# Patient Record
Sex: FEMALE | Race: WHITE | NOT HISPANIC OR LATINO | Employment: OTHER | URBAN - METROPOLITAN AREA
[De-identification: names, ages, dates, MRNs, and addresses within clinical notes are randomized per-mention and may not be internally consistent; named-entity substitution may affect disease eponyms.]

---

## 2019-06-26 ENCOUNTER — RECORDS - HEALTHEAST (OUTPATIENT)
Dept: LAB | Facility: CLINIC | Age: 79
End: 2019-06-26

## 2019-06-27 LAB
ANION GAP SERPL CALCULATED.3IONS-SCNC: 8 MMOL/L (ref 5–18)
BUN SERPL-MCNC: 8 MG/DL (ref 8–28)
CALCIUM SERPL-MCNC: 8.8 MG/DL (ref 8.5–10.5)
CHLORIDE BLD-SCNC: 98 MMOL/L (ref 98–107)
CO2 SERPL-SCNC: 24 MMOL/L (ref 22–31)
CREAT SERPL-MCNC: 0.56 MG/DL (ref 0.6–1.1)
GFR SERPL CREATININE-BSD FRML MDRD: >60 ML/MIN/1.73M2
GLUCOSE BLD-MCNC: 89 MG/DL (ref 70–125)
POTASSIUM BLD-SCNC: 3.7 MMOL/L (ref 3.5–5)
SODIUM SERPL-SCNC: 130 MMOL/L (ref 136–145)

## 2019-06-28 ENCOUNTER — RECORDS - HEALTHEAST (OUTPATIENT)
Dept: LAB | Facility: CLINIC | Age: 79
End: 2019-06-28

## 2019-06-28 LAB — SODIUM SERPL-SCNC: 133 MMOL/L (ref 136–145)

## 2019-07-05 ENCOUNTER — RECORDS - HEALTHEAST (OUTPATIENT)
Dept: LAB | Facility: CLINIC | Age: 79
End: 2019-07-05

## 2019-07-08 LAB
ANION GAP SERPL CALCULATED.3IONS-SCNC: 10 MMOL/L (ref 5–18)
BUN SERPL-MCNC: 8 MG/DL (ref 8–28)
CALCIUM SERPL-MCNC: 9.4 MG/DL (ref 8.5–10.5)
CHLORIDE BLD-SCNC: 100 MMOL/L (ref 98–107)
CO2 SERPL-SCNC: 24 MMOL/L (ref 22–31)
CREAT SERPL-MCNC: 0.59 MG/DL (ref 0.6–1.1)
GFR SERPL CREATININE-BSD FRML MDRD: >60 ML/MIN/1.73M2
GLUCOSE BLD-MCNC: 83 MG/DL (ref 70–125)
POTASSIUM BLD-SCNC: 3.9 MMOL/L (ref 3.5–5)
SODIUM SERPL-SCNC: 134 MMOL/L (ref 136–145)

## 2021-11-26 ENCOUNTER — LAB REQUISITION (OUTPATIENT)
Dept: LAB | Facility: CLINIC | Age: 81
End: 2021-11-26
Payer: COMMERCIAL

## 2021-11-26 ENCOUNTER — LAB REQUISITION (OUTPATIENT)
Dept: LAB | Facility: CLINIC | Age: 81
End: 2021-11-26

## 2021-11-26 DIAGNOSIS — M81.0 AGE-RELATED OSTEOPOROSIS WITHOUT CURRENT PATHOLOGICAL FRACTURE: ICD-10-CM

## 2021-11-26 DIAGNOSIS — M06.9 RHEUMATOID ARTHRITIS, UNSPECIFIED (H): ICD-10-CM

## 2021-11-26 DIAGNOSIS — K29.80 DUODENITIS WITHOUT BLEEDING: ICD-10-CM

## 2021-11-26 DIAGNOSIS — E22.2 SYNDROME OF INAPPROPRIATE SECRETION OF ANTIDIURETIC HORMONE (H): ICD-10-CM

## 2021-11-26 DIAGNOSIS — E22.0 ACROMEGALY AND PITUITARY GIGANTISM (H): ICD-10-CM

## 2021-11-26 LAB
ALBUMIN SERPL-MCNC: 3 G/DL (ref 3.5–5)
ANION GAP SERPL CALCULATED.3IONS-SCNC: 13 MMOL/L (ref 5–18)
BUN SERPL-MCNC: 9 MG/DL (ref 8–28)
CALCIUM SERPL-MCNC: 8.7 MG/DL (ref 8.5–10.5)
CHLORIDE BLD-SCNC: 98 MMOL/L (ref 98–107)
CO2 SERPL-SCNC: 20 MMOL/L (ref 22–31)
CREAT SERPL-MCNC: 0.67 MG/DL (ref 0.6–1.1)
ERYTHROCYTE [DISTWIDTH] IN BLOOD BY AUTOMATED COUNT: 13.2 % (ref 10–15)
ERYTHROCYTE [SEDIMENTATION RATE] IN BLOOD BY WESTERGREN METHOD: 80 MM/HR (ref 0–20)
GFR SERPL CREATININE-BSD FRML MDRD: 82 ML/MIN/1.73M2
GLUCOSE BLD-MCNC: 114 MG/DL (ref 70–125)
HCT VFR BLD AUTO: 31.5 % (ref 35–47)
HGB BLD-MCNC: 10.2 G/DL (ref 11.7–15.7)
MCH RBC QN AUTO: 30.4 PG (ref 26.5–33)
MCHC RBC AUTO-ENTMCNC: 32.4 G/DL (ref 31.5–36.5)
MCV RBC AUTO: 94 FL (ref 78–100)
PHOSPHATE SERPL-MCNC: 3.3 MG/DL (ref 2.5–4.5)
PLATELET # BLD AUTO: 287 10E3/UL (ref 150–450)
POTASSIUM BLD-SCNC: 4.4 MMOL/L (ref 3.5–5)
RBC # BLD AUTO: 3.36 10E6/UL (ref 3.8–5.2)
SODIUM SERPL-SCNC: 131 MMOL/L (ref 136–145)
WBC # BLD AUTO: 6.4 10E3/UL (ref 4–11)

## 2021-11-26 PROCEDURE — 85652 RBC SED RATE AUTOMATED: CPT | Mod: ORL | Performed by: FAMILY MEDICINE

## 2021-11-26 PROCEDURE — 85027 COMPLETE CBC AUTOMATED: CPT | Mod: ORL | Performed by: FAMILY MEDICINE

## 2021-11-26 PROCEDURE — 82306 VITAMIN D 25 HYDROXY: CPT | Mod: ORL | Performed by: FAMILY MEDICINE

## 2021-11-26 PROCEDURE — 80069 RENAL FUNCTION PANEL: CPT | Mod: ORL | Performed by: FAMILY MEDICINE

## 2021-11-29 LAB — DEPRECATED CALCIDIOL+CALCIFEROL SERPL-MC: 68 UG/L (ref 30–80)

## 2021-12-28 ENCOUNTER — LAB REQUISITION (OUTPATIENT)
Dept: LAB | Facility: CLINIC | Age: 81
End: 2021-12-28
Payer: COMMERCIAL

## 2021-12-28 DIAGNOSIS — E87.1 HYPO-OSMOLALITY AND HYPONATREMIA: ICD-10-CM

## 2021-12-30 LAB
ANION GAP SERPL CALCULATED.3IONS-SCNC: 13 MMOL/L (ref 5–18)
BUN SERPL-MCNC: 12 MG/DL (ref 8–28)
CALCIUM SERPL-MCNC: 9.3 MG/DL (ref 8.5–10.5)
CHLORIDE BLD-SCNC: 93 MMOL/L (ref 98–107)
CO2 SERPL-SCNC: 20 MMOL/L (ref 22–31)
CREAT SERPL-MCNC: 0.72 MG/DL (ref 0.6–1.1)
GFR SERPL CREATININE-BSD FRML MDRD: 83 ML/MIN/1.73M2
GLUCOSE BLD-MCNC: 95 MG/DL (ref 70–125)
POTASSIUM BLD-SCNC: 4.5 MMOL/L (ref 3.5–5)
SODIUM SERPL-SCNC: 126 MMOL/L (ref 136–145)

## 2021-12-30 PROCEDURE — 80048 BASIC METABOLIC PNL TOTAL CA: CPT | Mod: ORL | Performed by: FAMILY MEDICINE

## 2021-12-30 PROCEDURE — P9603 ONE-WAY ALLOW PRORATED MILES: HCPCS | Mod: ORL | Performed by: FAMILY MEDICINE

## 2021-12-30 PROCEDURE — 36415 COLL VENOUS BLD VENIPUNCTURE: CPT | Mod: ORL | Performed by: FAMILY MEDICINE

## 2022-01-03 ENCOUNTER — LAB REQUISITION (OUTPATIENT)
Dept: LAB | Facility: CLINIC | Age: 82
End: 2022-01-03

## 2022-01-03 ENCOUNTER — LAB REQUISITION (OUTPATIENT)
Dept: LAB | Facility: CLINIC | Age: 82
End: 2022-01-03
Payer: COMMERCIAL

## 2022-01-03 DIAGNOSIS — E87.1 HYPO-OSMOLALITY AND HYPONATREMIA: ICD-10-CM

## 2022-01-03 DIAGNOSIS — I10 ESSENTIAL (PRIMARY) HYPERTENSION: ICD-10-CM

## 2022-01-03 LAB
ANION GAP SERPL CALCULATED.3IONS-SCNC: 15 MMOL/L (ref 5–18)
BUN SERPL-MCNC: 11 MG/DL (ref 8–28)
CALCIUM SERPL-MCNC: 9.8 MG/DL (ref 8.5–10.5)
CHLORIDE BLD-SCNC: 95 MMOL/L (ref 98–107)
CO2 SERPL-SCNC: 20 MMOL/L (ref 22–31)
CREAT SERPL-MCNC: 0.74 MG/DL (ref 0.6–1.1)
GFR SERPL CREATININE-BSD FRML MDRD: 80 ML/MIN/1.73M2
GLUCOSE BLD-MCNC: 116 MG/DL (ref 70–125)
POTASSIUM BLD-SCNC: 4.4 MMOL/L (ref 3.5–5)
SODIUM SERPL-SCNC: 130 MMOL/L (ref 136–145)

## 2022-01-03 PROCEDURE — 36415 COLL VENOUS BLD VENIPUNCTURE: CPT | Mod: ORL | Performed by: FAMILY MEDICINE

## 2022-01-03 PROCEDURE — 80048 BASIC METABOLIC PNL TOTAL CA: CPT | Mod: ORL | Performed by: FAMILY MEDICINE

## 2022-01-03 PROCEDURE — P9603 ONE-WAY ALLOW PRORATED MILES: HCPCS | Mod: ORL | Performed by: FAMILY MEDICINE

## 2022-01-13 ENCOUNTER — LAB REQUISITION (OUTPATIENT)
Dept: LAB | Facility: CLINIC | Age: 82
End: 2022-01-13
Payer: COMMERCIAL

## 2022-01-13 LAB
ALBUMIN UR-MCNC: NEGATIVE MG/DL
APPEARANCE UR: ABNORMAL
BACTERIA #/AREA URNS HPF: ABNORMAL /HPF
BILIRUB UR QL STRIP: NEGATIVE
COLOR UR AUTO: ABNORMAL
GLUCOSE UR STRIP-MCNC: NEGATIVE MG/DL
HGB UR QL STRIP: NEGATIVE
KETONES UR STRIP-MCNC: NEGATIVE MG/DL
LEUKOCYTE ESTERASE UR QL STRIP: ABNORMAL
MUCOUS THREADS #/AREA URNS LPF: PRESENT /LPF
NITRATE UR QL: POSITIVE
PH UR STRIP: 6.5 [PH] (ref 5–7)
RBC URINE: 1 /HPF
SP GR UR STRIP: 1.01 (ref 1–1.03)
SQUAMOUS EPITHELIAL: <1 /HPF
UROBILINOGEN UR STRIP-MCNC: <2 MG/DL
WBC URINE: >182 /HPF

## 2022-01-13 PROCEDURE — 87086 URINE CULTURE/COLONY COUNT: CPT | Mod: ORL | Performed by: FAMILY MEDICINE

## 2022-01-13 PROCEDURE — 81001 URINALYSIS AUTO W/SCOPE: CPT | Mod: ORL | Performed by: FAMILY MEDICINE

## 2022-01-15 LAB — BACTERIA UR CULT: ABNORMAL

## 2022-02-15 ENCOUNTER — LAB REQUISITION (OUTPATIENT)
Dept: LAB | Facility: CLINIC | Age: 82
End: 2022-02-15
Payer: COMMERCIAL

## 2022-02-15 DIAGNOSIS — E87.1 HYPO-OSMOLALITY AND HYPONATREMIA: ICD-10-CM

## 2022-02-16 LAB
ANION GAP SERPL CALCULATED.3IONS-SCNC: 11 MMOL/L (ref 5–18)
BUN SERPL-MCNC: 10 MG/DL (ref 8–28)
CALCIUM SERPL-MCNC: 9.9 MG/DL (ref 8.5–10.5)
CHLORIDE BLD-SCNC: 97 MMOL/L (ref 98–107)
CO2 SERPL-SCNC: 27 MMOL/L (ref 22–31)
CREAT SERPL-MCNC: 0.71 MG/DL (ref 0.6–1.1)
GFR SERPL CREATININE-BSD FRML MDRD: 84 ML/MIN/1.73M2
GLUCOSE BLD-MCNC: 103 MG/DL (ref 70–125)
POTASSIUM BLD-SCNC: 4.3 MMOL/L (ref 3.5–5)
SODIUM SERPL-SCNC: 135 MMOL/L (ref 136–145)

## 2022-02-16 PROCEDURE — 80048 BASIC METABOLIC PNL TOTAL CA: CPT | Mod: ORL | Performed by: FAMILY MEDICINE

## 2022-02-16 PROCEDURE — 36415 COLL VENOUS BLD VENIPUNCTURE: CPT | Mod: ORL | Performed by: FAMILY MEDICINE

## 2022-02-16 PROCEDURE — P9604 ONE-WAY ALLOW PRORATED TRIP: HCPCS | Mod: ORL | Performed by: FAMILY MEDICINE

## 2022-02-22 ENCOUNTER — LAB REQUISITION (OUTPATIENT)
Dept: LAB | Facility: CLINIC | Age: 82
End: 2022-02-22
Payer: COMMERCIAL

## 2022-02-22 DIAGNOSIS — R39.0 EXTRAVASATION OF URINE: ICD-10-CM

## 2022-02-22 LAB
ALBUMIN UR-MCNC: 10 MG/DL
APPEARANCE UR: ABNORMAL
BACTERIA #/AREA URNS HPF: ABNORMAL /HPF
BILIRUB UR QL STRIP: NEGATIVE
COLOR UR AUTO: YELLOW
GLUCOSE UR STRIP-MCNC: NEGATIVE MG/DL
HGB UR QL STRIP: ABNORMAL
KETONES UR STRIP-MCNC: NEGATIVE MG/DL
LEUKOCYTE ESTERASE UR QL STRIP: ABNORMAL
NITRATE UR QL: NEGATIVE
PH UR STRIP: 6.5 [PH] (ref 5–7)
RBC URINE: 0 /HPF
SP GR UR STRIP: 1.01 (ref 1–1.03)
UROBILINOGEN UR STRIP-MCNC: <2 MG/DL
WBC CLUMPS #/AREA URNS HPF: PRESENT /HPF
WBC URINE: >182 /HPF

## 2022-02-22 PROCEDURE — 81001 URINALYSIS AUTO W/SCOPE: CPT | Mod: ORL | Performed by: FAMILY MEDICINE

## 2022-02-22 PROCEDURE — 87186 SC STD MICRODIL/AGAR DIL: CPT | Mod: ORL | Performed by: FAMILY MEDICINE

## 2022-02-24 LAB — BACTERIA UR CULT: ABNORMAL

## 2022-03-11 ENCOUNTER — LAB REQUISITION (OUTPATIENT)
Dept: LAB | Facility: CLINIC | Age: 82
End: 2022-03-11
Payer: COMMERCIAL

## 2022-03-11 DIAGNOSIS — R39.0 EXTRAVASATION OF URINE: ICD-10-CM

## 2022-03-11 LAB
ALBUMIN UR-MCNC: NEGATIVE MG/DL
APPEARANCE UR: CLEAR
BILIRUB UR QL STRIP: NEGATIVE
COLOR UR AUTO: NORMAL
GLUCOSE UR STRIP-MCNC: NEGATIVE MG/DL
HGB UR QL STRIP: NEGATIVE
KETONES UR STRIP-MCNC: NEGATIVE MG/DL
LEUKOCYTE ESTERASE UR QL STRIP: NEGATIVE
NITRATE UR QL: NEGATIVE
PH UR STRIP: 6.5 [PH] (ref 5–7)
SP GR UR STRIP: 1.01 (ref 1–1.03)
UROBILINOGEN UR STRIP-MCNC: <2 MG/DL

## 2022-03-11 PROCEDURE — 87086 URINE CULTURE/COLONY COUNT: CPT | Mod: ORL | Performed by: FAMILY MEDICINE

## 2022-03-11 PROCEDURE — 81003 URINALYSIS AUTO W/O SCOPE: CPT | Mod: ORL | Performed by: FAMILY MEDICINE

## 2022-03-12 LAB — BACTERIA UR CULT: NO GROWTH

## 2022-04-09 ENCOUNTER — LAB REQUISITION (OUTPATIENT)
Dept: LAB | Facility: CLINIC | Age: 82
End: 2022-04-09
Payer: COMMERCIAL

## 2022-04-09 DIAGNOSIS — I50.33 ACUTE ON CHRONIC DIASTOLIC (CONGESTIVE) HEART FAILURE (H): ICD-10-CM

## 2022-04-10 LAB
ANION GAP SERPL CALCULATED.3IONS-SCNC: 17 MMOL/L (ref 5–18)
BUN SERPL-MCNC: 19 MG/DL (ref 8–28)
CALCIUM SERPL-MCNC: 9.3 MG/DL (ref 8.5–10.5)
CHLORIDE BLD-SCNC: 98 MMOL/L (ref 98–107)
CO2 SERPL-SCNC: 18 MMOL/L (ref 22–31)
CREAT SERPL-MCNC: 0.96 MG/DL (ref 0.6–1.1)
ERYTHROCYTE [DISTWIDTH] IN BLOOD BY AUTOMATED COUNT: 13.8 % (ref 10–15)
GFR SERPL CREATININE-BSD FRML MDRD: 59 ML/MIN/1.73M2
GLUCOSE BLD-MCNC: 120 MG/DL (ref 70–125)
HCT VFR BLD AUTO: 36.4 % (ref 35–47)
HGB BLD-MCNC: 11.8 G/DL (ref 11.7–15.7)
MCH RBC QN AUTO: 30.9 PG (ref 26.5–33)
MCHC RBC AUTO-ENTMCNC: 32.4 G/DL (ref 31.5–36.5)
MCV RBC AUTO: 95 FL (ref 78–100)
PLATELET # BLD AUTO: 252 10E3/UL (ref 150–450)
POTASSIUM BLD-SCNC: 4.1 MMOL/L (ref 3.5–5)
RBC # BLD AUTO: 3.82 10E6/UL (ref 3.8–5.2)
SODIUM SERPL-SCNC: 133 MMOL/L (ref 136–145)
WBC # BLD AUTO: 7.8 10E3/UL (ref 4–11)

## 2022-04-10 PROCEDURE — 36415 COLL VENOUS BLD VENIPUNCTURE: CPT | Performed by: FAMILY MEDICINE

## 2022-04-10 PROCEDURE — 80048 BASIC METABOLIC PNL TOTAL CA: CPT | Performed by: FAMILY MEDICINE

## 2022-04-10 PROCEDURE — P9603 ONE-WAY ALLOW PRORATED MILES: HCPCS | Performed by: FAMILY MEDICINE

## 2022-04-10 PROCEDURE — 85027 COMPLETE CBC AUTOMATED: CPT | Performed by: FAMILY MEDICINE

## 2022-05-09 ENCOUNTER — LAB REQUISITION (OUTPATIENT)
Dept: LAB | Facility: CLINIC | Age: 82
End: 2022-05-09
Payer: COMMERCIAL

## 2022-05-09 DIAGNOSIS — M06.9 RHEUMATOID ARTHRITIS, UNSPECIFIED (H): ICD-10-CM

## 2022-05-11 LAB
C REACTIVE PROTEIN LHE: 0.6 MG/DL (ref 0–0.8)
ERYTHROCYTE [SEDIMENTATION RATE] IN BLOOD BY WESTERGREN METHOD: 48 MM/HR (ref 0–20)

## 2022-05-11 PROCEDURE — 85652 RBC SED RATE AUTOMATED: CPT | Mod: ORL | Performed by: INTERNAL MEDICINE

## 2022-05-11 PROCEDURE — 36415 COLL VENOUS BLD VENIPUNCTURE: CPT | Mod: ORL | Performed by: INTERNAL MEDICINE

## 2022-05-11 PROCEDURE — P9604 ONE-WAY ALLOW PRORATED TRIP: HCPCS | Mod: ORL | Performed by: INTERNAL MEDICINE

## 2022-05-11 PROCEDURE — 86140 C-REACTIVE PROTEIN: CPT | Mod: ORL | Performed by: INTERNAL MEDICINE

## 2022-08-28 ENCOUNTER — LAB REQUISITION (OUTPATIENT)
Dept: LAB | Facility: CLINIC | Age: 82
End: 2022-08-28
Payer: COMMERCIAL

## 2022-08-28 DIAGNOSIS — E87.1 HYPO-OSMOLALITY AND HYPONATREMIA: ICD-10-CM

## 2022-08-28 DIAGNOSIS — I50.32 CHRONIC DIASTOLIC (CONGESTIVE) HEART FAILURE (H): ICD-10-CM

## 2022-08-28 DIAGNOSIS — D64.9 ANEMIA, UNSPECIFIED: ICD-10-CM

## 2022-08-31 LAB
ERYTHROCYTE [DISTWIDTH] IN BLOOD BY AUTOMATED COUNT: 13.9 % (ref 10–15)
HCT VFR BLD AUTO: 36 % (ref 35–47)
HGB BLD-MCNC: 11 G/DL (ref 11.7–15.7)
MCH RBC QN AUTO: 29.9 PG (ref 26.5–33)
MCHC RBC AUTO-ENTMCNC: 30.6 G/DL (ref 31.5–36.5)
MCV RBC AUTO: 98 FL (ref 78–100)
PLATELET # BLD AUTO: 277 10E3/UL (ref 150–450)
RBC # BLD AUTO: 3.68 10E6/UL (ref 3.8–5.2)
WBC # BLD AUTO: 9.3 10E3/UL (ref 4–11)

## 2022-08-31 PROCEDURE — 85027 COMPLETE CBC AUTOMATED: CPT | Mod: ORL | Performed by: FAMILY MEDICINE

## 2022-08-31 PROCEDURE — 80053 COMPREHEN METABOLIC PANEL: CPT | Mod: ORL | Performed by: FAMILY MEDICINE

## 2022-08-31 PROCEDURE — 83880 ASSAY OF NATRIURETIC PEPTIDE: CPT | Mod: ORL | Performed by: FAMILY MEDICINE

## 2022-08-31 PROCEDURE — 84443 ASSAY THYROID STIM HORMONE: CPT | Mod: ORL | Performed by: FAMILY MEDICINE

## 2022-08-31 PROCEDURE — 36415 COLL VENOUS BLD VENIPUNCTURE: CPT | Mod: ORL | Performed by: FAMILY MEDICINE

## 2022-08-31 PROCEDURE — P9604 ONE-WAY ALLOW PRORATED TRIP: HCPCS | Mod: ORL | Performed by: FAMILY MEDICINE

## 2022-09-01 LAB
ALBUMIN SERPL BCG-MCNC: 4.2 G/DL (ref 3.5–5.2)
ALP SERPL-CCNC: 90 U/L (ref 35–104)
ALT SERPL W P-5'-P-CCNC: 8 U/L (ref 10–35)
ANION GAP SERPL CALCULATED.3IONS-SCNC: 16 MMOL/L (ref 7–15)
AST SERPL W P-5'-P-CCNC: 30 U/L (ref 10–35)
BILIRUB SERPL-MCNC: 0.3 MG/DL
BUN SERPL-MCNC: 13.7 MG/DL (ref 8–23)
CALCIUM SERPL-MCNC: 9.6 MG/DL (ref 8.8–10.2)
CHLORIDE SERPL-SCNC: 95 MMOL/L (ref 98–107)
CREAT SERPL-MCNC: 0.69 MG/DL (ref 0.51–0.95)
DEPRECATED HCO3 PLAS-SCNC: 24 MMOL/L (ref 22–29)
GFR SERPL CREATININE-BSD FRML MDRD: 86 ML/MIN/1.73M2
GLUCOSE SERPL-MCNC: 89 MG/DL (ref 70–99)
NT-PROBNP SERPL-MCNC: 1656 PG/ML (ref 0–450)
POTASSIUM SERPL-SCNC: 3.7 MMOL/L (ref 3.4–5.3)
PROT SERPL-MCNC: 7.6 G/DL (ref 6.4–8.3)
SODIUM SERPL-SCNC: 135 MMOL/L (ref 136–145)
TSH SERPL DL<=0.005 MIU/L-ACNC: 1.24 UIU/ML (ref 0.3–4.2)

## 2022-09-13 ENCOUNTER — LAB REQUISITION (OUTPATIENT)
Dept: LAB | Facility: CLINIC | Age: 82
End: 2022-09-13
Payer: COMMERCIAL

## 2022-09-13 DIAGNOSIS — L08.9 LOCAL INFECTION OF THE SKIN AND SUBCUTANEOUS TISSUE, UNSPECIFIED: ICD-10-CM

## 2022-09-14 LAB
BASOPHILS # BLD AUTO: 0.1 10E3/UL (ref 0–0.2)
BASOPHILS NFR BLD AUTO: 1 %
CRP SERPL-MCNC: 9.9 MG/L
EOSINOPHIL # BLD AUTO: 0.4 10E3/UL (ref 0–0.7)
EOSINOPHIL NFR BLD AUTO: 4 %
ERYTHROCYTE [DISTWIDTH] IN BLOOD BY AUTOMATED COUNT: 13.7 % (ref 10–15)
HCT VFR BLD AUTO: 34.1 % (ref 35–47)
HGB BLD-MCNC: 10.6 G/DL (ref 11.7–15.7)
IMM GRANULOCYTES # BLD: 0.1 10E3/UL
IMM GRANULOCYTES NFR BLD: 1 %
LYMPHOCYTES # BLD AUTO: 1.7 10E3/UL (ref 0.8–5.3)
LYMPHOCYTES NFR BLD AUTO: 16 %
MCH RBC QN AUTO: 29.9 PG (ref 26.5–33)
MCHC RBC AUTO-ENTMCNC: 31.1 G/DL (ref 31.5–36.5)
MCV RBC AUTO: 96 FL (ref 78–100)
MONOCYTES # BLD AUTO: 1.2 10E3/UL (ref 0–1.3)
MONOCYTES NFR BLD AUTO: 11 %
NEUTROPHILS # BLD AUTO: 7.5 10E3/UL (ref 1.6–8.3)
NEUTROPHILS NFR BLD AUTO: 67 %
NRBC # BLD AUTO: 0 10E3/UL
NRBC BLD AUTO-RTO: 0 /100
PLATELET # BLD AUTO: 256 10E3/UL (ref 150–450)
RBC # BLD AUTO: 3.54 10E6/UL (ref 3.8–5.2)
WBC # BLD AUTO: 11.1 10E3/UL (ref 4–11)

## 2022-09-14 PROCEDURE — P9604 ONE-WAY ALLOW PRORATED TRIP: HCPCS | Mod: ORL | Performed by: FAMILY MEDICINE

## 2022-09-14 PROCEDURE — 36415 COLL VENOUS BLD VENIPUNCTURE: CPT | Mod: ORL | Performed by: FAMILY MEDICINE

## 2022-09-14 PROCEDURE — 85025 COMPLETE CBC W/AUTO DIFF WBC: CPT | Mod: ORL | Performed by: FAMILY MEDICINE

## 2022-09-14 PROCEDURE — 86140 C-REACTIVE PROTEIN: CPT | Mod: ORL | Performed by: FAMILY MEDICINE

## 2023-01-01 ENCOUNTER — APPOINTMENT (OUTPATIENT)
Dept: CT IMAGING | Facility: CLINIC | Age: 83
DRG: 871 | End: 2023-01-01
Attending: EMERGENCY MEDICINE
Payer: COMMERCIAL

## 2023-01-01 ENCOUNTER — APPOINTMENT (OUTPATIENT)
Dept: GENERAL RADIOLOGY | Facility: CLINIC | Age: 83
DRG: 280 | End: 2023-01-01
Payer: COMMERCIAL

## 2023-01-01 ENCOUNTER — PATIENT OUTREACH (OUTPATIENT)
Dept: CARE COORDINATION | Facility: CLINIC | Age: 83
End: 2023-01-01
Payer: COMMERCIAL

## 2023-01-01 ENCOUNTER — APPOINTMENT (OUTPATIENT)
Dept: PHYSICAL THERAPY | Facility: CLINIC | Age: 83
DRG: 280 | End: 2023-01-01
Payer: COMMERCIAL

## 2023-01-01 ENCOUNTER — APPOINTMENT (OUTPATIENT)
Dept: GENERAL RADIOLOGY | Facility: CLINIC | Age: 83
End: 2023-01-01
Attending: EMERGENCY MEDICINE
Payer: COMMERCIAL

## 2023-01-01 ENCOUNTER — LAB REQUISITION (OUTPATIENT)
Dept: LAB | Facility: CLINIC | Age: 83
End: 2023-01-01
Payer: COMMERCIAL

## 2023-01-01 ENCOUNTER — ANESTHESIA EVENT (OUTPATIENT)
Dept: MEDSURG UNIT | Facility: CLINIC | Age: 83
DRG: 871 | End: 2023-01-01
Payer: COMMERCIAL

## 2023-01-01 ENCOUNTER — ANESTHESIA (OUTPATIENT)
Dept: MEDSURG UNIT | Facility: CLINIC | Age: 83
DRG: 871 | End: 2023-01-01
Payer: COMMERCIAL

## 2023-01-01 ENCOUNTER — APPOINTMENT (OUTPATIENT)
Dept: CARDIOLOGY | Facility: CLINIC | Age: 83
DRG: 871 | End: 2023-01-01
Attending: HOSPITALIST
Payer: COMMERCIAL

## 2023-01-01 ENCOUNTER — APPOINTMENT (OUTPATIENT)
Dept: NUCLEAR MEDICINE | Facility: CLINIC | Age: 83
DRG: 280 | End: 2023-01-01
Attending: PHYSICIAN ASSISTANT
Payer: COMMERCIAL

## 2023-01-01 ENCOUNTER — APPOINTMENT (OUTPATIENT)
Dept: GENERAL RADIOLOGY | Facility: CLINIC | Age: 83
DRG: 871 | End: 2023-01-01
Attending: SURGERY
Payer: COMMERCIAL

## 2023-01-01 ENCOUNTER — APPOINTMENT (OUTPATIENT)
Dept: CARDIOLOGY | Facility: CLINIC | Age: 83
DRG: 280 | End: 2023-01-01
Payer: COMMERCIAL

## 2023-01-01 ENCOUNTER — DOCUMENTATION ONLY (OUTPATIENT)
Dept: OTHER | Facility: CLINIC | Age: 83
End: 2023-01-01
Payer: COMMERCIAL

## 2023-01-01 ENCOUNTER — APPOINTMENT (OUTPATIENT)
Dept: CT IMAGING | Facility: CLINIC | Age: 83
End: 2023-01-01
Attending: HOSPITALIST
Payer: COMMERCIAL

## 2023-01-01 ENCOUNTER — APPOINTMENT (OUTPATIENT)
Dept: CT IMAGING | Facility: CLINIC | Age: 83
DRG: 871 | End: 2023-01-01
Payer: COMMERCIAL

## 2023-01-01 ENCOUNTER — TRANSFERRED RECORDS (OUTPATIENT)
Dept: HEALTH INFORMATION MANAGEMENT | Facility: CLINIC | Age: 83
End: 2023-01-01
Payer: COMMERCIAL

## 2023-01-01 ENCOUNTER — APPOINTMENT (OUTPATIENT)
Dept: GENERAL RADIOLOGY | Facility: CLINIC | Age: 83
DRG: 280 | End: 2023-01-01
Attending: EMERGENCY MEDICINE
Payer: COMMERCIAL

## 2023-01-01 ENCOUNTER — APPOINTMENT (OUTPATIENT)
Dept: CT IMAGING | Facility: CLINIC | Age: 83
End: 2023-01-01
Attending: EMERGENCY MEDICINE
Payer: COMMERCIAL

## 2023-01-01 ENCOUNTER — HOSPITAL ENCOUNTER (INPATIENT)
Facility: CLINIC | Age: 83
LOS: 7 days | Discharge: NURSING, REHAB OR LONG-TERM CARE | DRG: 280 | End: 2023-08-01
Attending: EMERGENCY MEDICINE | Admitting: INTERNAL MEDICINE
Payer: COMMERCIAL

## 2023-01-01 ENCOUNTER — APPOINTMENT (OUTPATIENT)
Dept: OCCUPATIONAL THERAPY | Facility: CLINIC | Age: 83
End: 2023-01-01
Attending: HOSPITALIST
Payer: COMMERCIAL

## 2023-01-01 ENCOUNTER — APPOINTMENT (OUTPATIENT)
Dept: GENERAL RADIOLOGY | Facility: CLINIC | Age: 83
DRG: 871 | End: 2023-01-01
Attending: INTERNAL MEDICINE
Payer: COMMERCIAL

## 2023-01-01 ENCOUNTER — TELEPHONE (OUTPATIENT)
Dept: CARDIOLOGY | Facility: CLINIC | Age: 83
End: 2023-01-01
Payer: COMMERCIAL

## 2023-01-01 ENCOUNTER — APPOINTMENT (OUTPATIENT)
Dept: CT IMAGING | Facility: CLINIC | Age: 83
DRG: 280 | End: 2023-01-01
Attending: EMERGENCY MEDICINE
Payer: COMMERCIAL

## 2023-01-01 ENCOUNTER — MEDICAL CORRESPONDENCE (OUTPATIENT)
Dept: HEALTH INFORMATION MANAGEMENT | Facility: CLINIC | Age: 83
End: 2023-01-01

## 2023-01-01 ENCOUNTER — APPOINTMENT (OUTPATIENT)
Dept: GENERAL RADIOLOGY | Facility: CLINIC | Age: 83
DRG: 871 | End: 2023-01-01
Attending: EMERGENCY MEDICINE
Payer: COMMERCIAL

## 2023-01-01 ENCOUNTER — APPOINTMENT (OUTPATIENT)
Dept: PHYSICAL THERAPY | Facility: CLINIC | Age: 83
End: 2023-01-01
Attending: HOSPITALIST
Payer: COMMERCIAL

## 2023-01-01 ENCOUNTER — HOSPITAL ENCOUNTER (INPATIENT)
Facility: CLINIC | Age: 83
LOS: 10 days | Discharge: HOSPICE | DRG: 871 | End: 2023-10-31
Attending: EMERGENCY MEDICINE | Admitting: HOSPITALIST
Payer: COMMERCIAL

## 2023-01-01 ENCOUNTER — HOSPITAL ENCOUNTER (OUTPATIENT)
Facility: CLINIC | Age: 83
Setting detail: OBSERVATION
Discharge: HOME | End: 2023-10-13
Attending: EMERGENCY MEDICINE | Admitting: HOSPITALIST
Payer: COMMERCIAL

## 2023-01-01 ENCOUNTER — APPOINTMENT (OUTPATIENT)
Dept: PHYSICAL THERAPY | Facility: CLINIC | Age: 83
DRG: 280 | End: 2023-01-01
Attending: STUDENT IN AN ORGANIZED HEALTH CARE EDUCATION/TRAINING PROGRAM
Payer: COMMERCIAL

## 2023-01-01 ENCOUNTER — APPOINTMENT (OUTPATIENT)
Dept: GENERAL RADIOLOGY | Facility: CLINIC | Age: 83
DRG: 871 | End: 2023-01-01
Payer: COMMERCIAL

## 2023-01-01 ENCOUNTER — HOSPITAL ENCOUNTER (EMERGENCY)
Facility: CLINIC | Age: 83
Discharge: HOME | End: 2023-04-04
Attending: EMERGENCY MEDICINE | Admitting: EMERGENCY MEDICINE
Payer: COMMERCIAL

## 2023-01-01 ENCOUNTER — APPOINTMENT (OUTPATIENT)
Dept: PHYSICAL THERAPY | Facility: CLINIC | Age: 83
DRG: 871 | End: 2023-01-01
Attending: NURSE PRACTITIONER
Payer: COMMERCIAL

## 2023-01-01 VITALS
WEIGHT: 99.65 LBS | DIASTOLIC BLOOD PRESSURE: 55 MMHG | BODY MASS INDEX: 18.23 KG/M2 | HEART RATE: 85 BPM | RESPIRATION RATE: 18 BRPM | OXYGEN SATURATION: 92 % | TEMPERATURE: 97.8 F | SYSTOLIC BLOOD PRESSURE: 122 MMHG

## 2023-01-01 VITALS
OXYGEN SATURATION: 93 % | HEIGHT: 62 IN | HEART RATE: 75 BPM | BODY MASS INDEX: 16.96 KG/M2 | RESPIRATION RATE: 17 BRPM | WEIGHT: 92.15 LBS | TEMPERATURE: 98.9 F | DIASTOLIC BLOOD PRESSURE: 42 MMHG | SYSTOLIC BLOOD PRESSURE: 104 MMHG

## 2023-01-01 VITALS
TEMPERATURE: 98 F | HEART RATE: 92 BPM | DIASTOLIC BLOOD PRESSURE: 65 MMHG | RESPIRATION RATE: 16 BRPM | SYSTOLIC BLOOD PRESSURE: 126 MMHG | OXYGEN SATURATION: 95 %

## 2023-01-01 VITALS
OXYGEN SATURATION: 95 % | SYSTOLIC BLOOD PRESSURE: 116 MMHG | BODY MASS INDEX: 16.57 KG/M2 | WEIGHT: 90.61 LBS | HEART RATE: 94 BPM | RESPIRATION RATE: 16 BRPM | DIASTOLIC BLOOD PRESSURE: 55 MMHG | TEMPERATURE: 97.7 F

## 2023-01-01 DIAGNOSIS — R19.7 DIARRHEA, UNSPECIFIED TYPE: ICD-10-CM

## 2023-01-01 DIAGNOSIS — E87.1 HYPO-OSMOLALITY AND HYPONATREMIA: ICD-10-CM

## 2023-01-01 DIAGNOSIS — J96.01 ACUTE RESPIRATORY FAILURE WITH HYPOXIA (H): ICD-10-CM

## 2023-01-01 DIAGNOSIS — N12 PYELONEPHRITIS: ICD-10-CM

## 2023-01-01 DIAGNOSIS — J98.4 PNEUMONITIS: ICD-10-CM

## 2023-01-01 DIAGNOSIS — E11.9 TYPE 2 DIABETES MELLITUS WITHOUT COMPLICATIONS (H): ICD-10-CM

## 2023-01-01 DIAGNOSIS — E87.20 ACIDOSIS, UNSPECIFIED: ICD-10-CM

## 2023-01-01 DIAGNOSIS — I50.33 ACUTE ON CHRONIC DIASTOLIC (CONGESTIVE) HEART FAILURE (H): ICD-10-CM

## 2023-01-01 DIAGNOSIS — D64.9 ANEMIA, UNSPECIFIED: ICD-10-CM

## 2023-01-01 DIAGNOSIS — I50.9 ACUTE ON CHRONIC CONGESTIVE HEART FAILURE, UNSPECIFIED HEART FAILURE TYPE (H): Primary | ICD-10-CM

## 2023-01-01 DIAGNOSIS — R11.2 NAUSEA AND VOMITING, UNSPECIFIED VOMITING TYPE: ICD-10-CM

## 2023-01-01 DIAGNOSIS — R35.0 FREQUENCY OF MICTURITION: ICD-10-CM

## 2023-01-01 DIAGNOSIS — I50.30 UNSPECIFIED DIASTOLIC (CONGESTIVE) HEART FAILURE (H): ICD-10-CM

## 2023-01-01 DIAGNOSIS — N39.0 URINARY TRACT INFECTION, SITE NOT SPECIFIED: ICD-10-CM

## 2023-01-01 DIAGNOSIS — I50.32 CHRONIC DIASTOLIC (CONGESTIVE) HEART FAILURE (H): ICD-10-CM

## 2023-01-01 DIAGNOSIS — R39.0 EXTRAVASATION OF URINE: ICD-10-CM

## 2023-01-01 DIAGNOSIS — G93.41 METABOLIC ENCEPHALOPATHY: ICD-10-CM

## 2023-01-01 DIAGNOSIS — S22.21XA FRACTURE OF MANUBRIUM, INITIAL ENCOUNTER FOR CLOSED FRACTURE: ICD-10-CM

## 2023-01-01 DIAGNOSIS — I21.A1 MYOCARDIAL INFARCTION TYPE 2 (H): ICD-10-CM

## 2023-01-01 DIAGNOSIS — Z51.5 HOSPICE CARE PATIENT: ICD-10-CM

## 2023-01-01 DIAGNOSIS — R30.0 DYSURIA: ICD-10-CM

## 2023-01-01 DIAGNOSIS — I25.10 CORONARY ARTERY DISEASE INVOLVING NATIVE HEART WITHOUT ANGINA PECTORIS, UNSPECIFIED VESSEL OR LESION TYPE: ICD-10-CM

## 2023-01-01 DIAGNOSIS — S06.5XAA SUBDURAL HEMATOMA (H): ICD-10-CM

## 2023-01-01 DIAGNOSIS — R32 UNSPECIFIED URINARY INCONTINENCE: ICD-10-CM

## 2023-01-01 DIAGNOSIS — M06.9 RHEUMATOID ARTHRITIS, UNSPECIFIED (H): ICD-10-CM

## 2023-01-01 LAB
ALBUMIN SERPL BCG-MCNC: 2.8 G/DL (ref 3.5–5.2)
ALBUMIN SERPL BCG-MCNC: 3.2 G/DL (ref 3.5–5.2)
ALBUMIN SERPL BCG-MCNC: 3.6 G/DL (ref 3.5–5.2)
ALBUMIN SERPL BCG-MCNC: 3.9 G/DL (ref 3.5–5.2)
ALBUMIN UR-MCNC: 10 MG/DL
ALBUMIN UR-MCNC: 100 MG/DL
ALBUMIN UR-MCNC: 50 MG/DL
ALBUMIN UR-MCNC: 50 MG/DL
ALBUMIN UR-MCNC: NEGATIVE MG/DL
ALLEN'S TEST: YES
ALP SERPL-CCNC: 102 U/L (ref 35–104)
ALP SERPL-CCNC: 74 U/L (ref 35–104)
ALP SERPL-CCNC: 74 U/L (ref 35–104)
ALP SERPL-CCNC: 78 U/L (ref 35–104)
ALP SERPL-CCNC: 88 U/L (ref 35–104)
ALP SERPL-CCNC: 98 U/L (ref 35–104)
ALT SERPL W P-5'-P-CCNC: 10 U/L (ref 0–50)
ALT SERPL W P-5'-P-CCNC: 12 U/L (ref 0–50)
ALT SERPL W P-5'-P-CCNC: 13 U/L (ref 0–50)
ALT SERPL W P-5'-P-CCNC: 13 U/L (ref 10–35)
ALT SERPL W P-5'-P-CCNC: 14 U/L (ref 0–50)
ALT SERPL W P-5'-P-CCNC: 9 U/L (ref 0–50)
AMPHETAMINES UR QL SCN: NORMAL
ANION GAP SERPL CALCULATED.3IONS-SCNC: 10 MMOL/L (ref 7–15)
ANION GAP SERPL CALCULATED.3IONS-SCNC: 11 MMOL/L (ref 7–15)
ANION GAP SERPL CALCULATED.3IONS-SCNC: 11 MMOL/L (ref 7–15)
ANION GAP SERPL CALCULATED.3IONS-SCNC: 12 MMOL/L (ref 7–15)
ANION GAP SERPL CALCULATED.3IONS-SCNC: 13 MMOL/L (ref 7–15)
ANION GAP SERPL CALCULATED.3IONS-SCNC: 14 MMOL/L (ref 7–15)
ANION GAP SERPL CALCULATED.3IONS-SCNC: 15 MMOL/L (ref 7–15)
ANION GAP SERPL CALCULATED.3IONS-SCNC: 16 MMOL/L (ref 7–15)
ANION GAP SERPL CALCULATED.3IONS-SCNC: 17 MMOL/L (ref 7–15)
ANION GAP SERPL CALCULATED.3IONS-SCNC: 18 MMOL/L (ref 7–15)
ANION GAP SERPL CALCULATED.3IONS-SCNC: 19 MMOL/L (ref 7–15)
ANION GAP SERPL CALCULATED.3IONS-SCNC: 20 MMOL/L (ref 7–15)
ANION GAP SERPL CALCULATED.3IONS-SCNC: 20 MMOL/L (ref 7–15)
ANION GAP SERPL CALCULATED.3IONS-SCNC: 21 MMOL/L (ref 7–15)
APAP SERPL-MCNC: <5 UG/ML (ref 10–30)
APPEARANCE UR: ABNORMAL
APPEARANCE UR: CLEAR
APPEARANCE UR: CLEAR
APTT PPP: 30 SECONDS (ref 22–38)
AST SERPL W P-5'-P-CCNC: 23 U/L (ref 0–45)
AST SERPL W P-5'-P-CCNC: 29 U/L (ref 0–45)
AST SERPL W P-5'-P-CCNC: 33 U/L (ref 0–45)
AST SERPL W P-5'-P-CCNC: 34 U/L (ref 0–45)
AST SERPL W P-5'-P-CCNC: 37 U/L (ref 0–45)
AST SERPL W P-5'-P-CCNC: 37 U/L (ref 10–35)
ATRIAL RATE - MUSE: 109 BPM
ATRIAL RATE - MUSE: 151 BPM
ATRIAL RATE - MUSE: 81 BPM
ATRIAL RATE - MUSE: 85 BPM
ATRIAL RATE - MUSE: 86 BPM
ATRIAL RATE - MUSE: 96 BPM
B-OH-BUTYR SERPL-SCNC: <0.18 MMOL/L
BACTERIA #/AREA URNS HPF: ABNORMAL /HPF
BACTERIA BLD CULT: NO GROWTH
BACTERIA UR CULT: ABNORMAL
BARBITURATES UR QL SCN: NORMAL
BASE EXCESS BLDA CALC-SCNC: -3.6 MMOL/L (ref -9–1.8)
BASE EXCESS BLDV CALC-SCNC: -1.8 MMOL/L (ref -7.7–1.9)
BASE EXCESS BLDV CALC-SCNC: -4.2 MMOL/L (ref -7.7–1.9)
BASE EXCESS BLDV CALC-SCNC: 2.6 MMOL/L (ref -7.7–1.9)
BASO+EOS+MONOS # BLD AUTO: ABNORMAL 10*3/UL
BASO+EOS+MONOS NFR BLD AUTO: ABNORMAL %
BASOPHILS # BLD AUTO: 0 10E3/UL (ref 0–0.2)
BASOPHILS # BLD AUTO: 0.1 10E3/UL (ref 0–0.2)
BASOPHILS NFR BLD AUTO: 0 %
BASOPHILS NFR BLD AUTO: 1 %
BENZODIAZ UR QL SCN: NORMAL
BILIRUB SERPL-MCNC: 0.3 MG/DL
BILIRUB SERPL-MCNC: 0.4 MG/DL
BILIRUB UR QL STRIP: NEGATIVE
BUN SERPL-MCNC: 10.7 MG/DL (ref 8–23)
BUN SERPL-MCNC: 11 MG/DL (ref 8–23)
BUN SERPL-MCNC: 11.1 MG/DL (ref 8–23)
BUN SERPL-MCNC: 11.4 MG/DL (ref 8–23)
BUN SERPL-MCNC: 12.6 MG/DL (ref 8–23)
BUN SERPL-MCNC: 12.8 MG/DL (ref 8–23)
BUN SERPL-MCNC: 13.5 MG/DL (ref 8–23)
BUN SERPL-MCNC: 15.3 MG/DL (ref 8–23)
BUN SERPL-MCNC: 15.5 MG/DL (ref 8–23)
BUN SERPL-MCNC: 16.4 MG/DL (ref 8–23)
BUN SERPL-MCNC: 18 MG/DL (ref 8–23)
BUN SERPL-MCNC: 18.2 MG/DL (ref 8–23)
BUN SERPL-MCNC: 18.9 MG/DL (ref 8–23)
BUN SERPL-MCNC: 20.5 MG/DL (ref 8–23)
BUN SERPL-MCNC: 20.8 MG/DL (ref 8–23)
BUN SERPL-MCNC: 20.9 MG/DL (ref 8–23)
BUN SERPL-MCNC: 20.9 MG/DL (ref 8–23)
BUN SERPL-MCNC: 21.3 MG/DL (ref 8–23)
BUN SERPL-MCNC: 22.2 MG/DL (ref 8–23)
BUN SERPL-MCNC: 24.6 MG/DL (ref 8–23)
BUN SERPL-MCNC: 24.9 MG/DL (ref 8–23)
BUN SERPL-MCNC: 28.2 MG/DL (ref 8–23)
BUN SERPL-MCNC: 28.8 MG/DL (ref 8–23)
BUN SERPL-MCNC: 36.2 MG/DL (ref 8–23)
BUN SERPL-MCNC: 49.9 MG/DL (ref 8–23)
BUN SERPL-MCNC: 8.2 MG/DL (ref 8–23)
BUN SERPL-MCNC: 8.6 MG/DL (ref 8–23)
BZE UR QL SCN: NORMAL
CA-I BLD-MCNC: 4.1 MG/DL (ref 4.4–5.2)
CA-I BLD-MCNC: 4.1 MG/DL (ref 4.4–5.2)
CA-I BLD-MCNC: 4.2 MG/DL (ref 4.4–5.2)
CA-I BLD-MCNC: 4.3 MG/DL (ref 4.4–5.2)
CA-I BLD-MCNC: 4.4 MG/DL (ref 4.4–5.2)
CA-I BLD-MCNC: 4.4 MG/DL (ref 4.4–5.2)
CALCIUM SERPL-MCNC: 7.6 MG/DL (ref 8.8–10.2)
CALCIUM SERPL-MCNC: 7.7 MG/DL (ref 8.8–10.2)
CALCIUM SERPL-MCNC: 7.7 MG/DL (ref 8.8–10.2)
CALCIUM SERPL-MCNC: 8.1 MG/DL (ref 8.8–10.2)
CALCIUM SERPL-MCNC: 8.2 MG/DL (ref 8.8–10.2)
CALCIUM SERPL-MCNC: 8.3 MG/DL (ref 8.8–10.2)
CALCIUM SERPL-MCNC: 8.4 MG/DL (ref 8.8–10.2)
CALCIUM SERPL-MCNC: 8.5 MG/DL (ref 8.8–10.2)
CALCIUM SERPL-MCNC: 8.6 MG/DL (ref 8.8–10.2)
CALCIUM SERPL-MCNC: 8.7 MG/DL (ref 8.8–10.2)
CALCIUM SERPL-MCNC: 8.8 MG/DL (ref 8.8–10.2)
CALCIUM SERPL-MCNC: 8.8 MG/DL (ref 8.8–10.2)
CALCIUM SERPL-MCNC: 8.9 MG/DL (ref 8.8–10.2)
CALCIUM SERPL-MCNC: 9 MG/DL (ref 8.8–10.2)
CALCIUM SERPL-MCNC: 9 MG/DL (ref 8.8–10.2)
CALCIUM SERPL-MCNC: 9.1 MG/DL (ref 8.8–10.2)
CALCIUM SERPL-MCNC: 9.2 MG/DL (ref 8.8–10.2)
CALCIUM SERPL-MCNC: 9.2 MG/DL (ref 8.8–10.2)
CALCIUM SERPL-MCNC: 9.3 MG/DL (ref 8.8–10.2)
CALCIUM SERPL-MCNC: 9.3 MG/DL (ref 8.8–10.2)
CALCIUM SERPL-MCNC: 9.5 MG/DL (ref 8.8–10.2)
CALCIUM SERPL-MCNC: 9.7 MG/DL (ref 8.8–10.2)
CANNABINOIDS UR QL SCN: NORMAL
CHLORIDE SERPL-SCNC: 100 MMOL/L (ref 98–107)
CHLORIDE SERPL-SCNC: 100 MMOL/L (ref 98–107)
CHLORIDE SERPL-SCNC: 101 MMOL/L (ref 98–107)
CHLORIDE SERPL-SCNC: 90 MMOL/L (ref 98–107)
CHLORIDE SERPL-SCNC: 91 MMOL/L (ref 98–107)
CHLORIDE SERPL-SCNC: 92 MMOL/L (ref 98–107)
CHLORIDE SERPL-SCNC: 93 MMOL/L (ref 98–107)
CHLORIDE SERPL-SCNC: 94 MMOL/L (ref 98–107)
CHLORIDE SERPL-SCNC: 95 MMOL/L (ref 98–107)
CHLORIDE SERPL-SCNC: 96 MMOL/L (ref 98–107)
CHLORIDE SERPL-SCNC: 97 MMOL/L (ref 98–107)
CHLORIDE SERPL-SCNC: 98 MMOL/L (ref 98–107)
COLOR UR AUTO: ABNORMAL
COLOR UR AUTO: NORMAL
COLOR UR AUTO: YELLOW
CREAT SERPL-MCNC: 0.51 MG/DL (ref 0.51–0.95)
CREAT SERPL-MCNC: 0.61 MG/DL (ref 0.51–0.95)
CREAT SERPL-MCNC: 0.63 MG/DL (ref 0.51–0.95)
CREAT SERPL-MCNC: 0.64 MG/DL (ref 0.51–0.95)
CREAT SERPL-MCNC: 0.66 MG/DL (ref 0.51–0.95)
CREAT SERPL-MCNC: 0.68 MG/DL (ref 0.51–0.95)
CREAT SERPL-MCNC: 0.7 MG/DL (ref 0.51–0.95)
CREAT SERPL-MCNC: 0.72 MG/DL (ref 0.51–0.95)
CREAT SERPL-MCNC: 0.75 MG/DL (ref 0.51–0.95)
CREAT SERPL-MCNC: 0.76 MG/DL (ref 0.51–0.95)
CREAT SERPL-MCNC: 0.77 MG/DL (ref 0.51–0.95)
CREAT SERPL-MCNC: 0.79 MG/DL (ref 0.51–0.95)
CREAT SERPL-MCNC: 0.8 MG/DL (ref 0.51–0.95)
CREAT SERPL-MCNC: 0.81 MG/DL (ref 0.51–0.95)
CREAT SERPL-MCNC: 0.83 MG/DL (ref 0.51–0.95)
CREAT SERPL-MCNC: 0.86 MG/DL (ref 0.51–0.95)
CREAT SERPL-MCNC: 0.86 MG/DL (ref 0.51–0.95)
CREAT SERPL-MCNC: 0.87 MG/DL (ref 0.51–0.95)
CREAT SERPL-MCNC: 0.9 MG/DL (ref 0.51–0.95)
CREAT SERPL-MCNC: 0.91 MG/DL (ref 0.51–0.95)
CREAT SERPL-MCNC: 0.94 MG/DL (ref 0.51–0.95)
CREAT SERPL-MCNC: 0.96 MG/DL (ref 0.51–0.95)
CREAT SERPL-MCNC: 0.96 MG/DL (ref 0.51–0.95)
CREAT SERPL-MCNC: 0.99 MG/DL (ref 0.51–0.95)
CREAT SERPL-MCNC: 1 MG/DL (ref 0.51–0.95)
CREAT SERPL-MCNC: 1.17 MG/DL (ref 0.51–0.95)
CREAT SERPL-MCNC: 1.26 MG/DL (ref 0.51–0.95)
CV BLOOD PRESSURE: 61 MMHG
CV STRESS MAX HR HE: 127
D DIMER PPP FEU-MCNC: 3.15 UG/ML FEU (ref 0–0.5)
DEPRECATED HCO3 PLAS-SCNC: 16 MMOL/L (ref 22–29)
DEPRECATED HCO3 PLAS-SCNC: 17 MMOL/L (ref 22–29)
DEPRECATED HCO3 PLAS-SCNC: 17 MMOL/L (ref 22–29)
DEPRECATED HCO3 PLAS-SCNC: 21 MMOL/L (ref 22–29)
DEPRECATED HCO3 PLAS-SCNC: 22 MMOL/L (ref 22–29)
DEPRECATED HCO3 PLAS-SCNC: 23 MMOL/L (ref 22–29)
DEPRECATED HCO3 PLAS-SCNC: 24 MMOL/L (ref 22–29)
DEPRECATED HCO3 PLAS-SCNC: 25 MMOL/L (ref 22–29)
DEPRECATED HCO3 PLAS-SCNC: 26 MMOL/L (ref 22–29)
DEPRECATED HCO3 PLAS-SCNC: 27 MMOL/L (ref 22–29)
DEPRECATED HCO3 PLAS-SCNC: 30 MMOL/L (ref 22–29)
DIASTOLIC BLOOD PRESSURE - MUSE: NORMAL MMHG
EGFRCR SERPLBLD CKD-EPI 2021: 58 ML/MIN/1.73M2
EGFRCR SERPLBLD CKD-EPI 2021: 60 ML/MIN/1.73M2
EGFRCR SERPLBLD CKD-EPI 2021: 62 ML/MIN/1.73M2
EGFRCR SERPLBLD CKD-EPI 2021: 63 ML/MIN/1.73M2
EGFRCR SERPLBLD CKD-EPI 2021: 65 ML/MIN/1.73M2
EGFRCR SERPLBLD CKD-EPI 2021: 66 ML/MIN/1.73M2
EGFRCR SERPLBLD CKD-EPI 2021: 71 ML/MIN/1.73M2
EGFRCR SERPLBLD CKD-EPI 2021: 72 ML/MIN/1.73M2
EGFRCR SERPLBLD CKD-EPI 2021: 73 ML/MIN/1.73M2
EGFRCR SERPLBLD CKD-EPI 2021: 78 ML/MIN/1.73M2
EOSINOPHIL # BLD AUTO: 0.2 10E3/UL (ref 0–0.7)
EOSINOPHIL # BLD AUTO: 0.2 10E3/UL (ref 0–0.7)
EOSINOPHIL # BLD AUTO: 0.3 10E3/UL (ref 0–0.7)
EOSINOPHIL # BLD AUTO: 0.4 10E3/UL (ref 0–0.7)
EOSINOPHIL # BLD AUTO: 0.5 10E3/UL (ref 0–0.7)
EOSINOPHIL # BLD AUTO: 0.7 10E3/UL (ref 0–0.7)
EOSINOPHIL NFR BLD AUTO: 2 %
EOSINOPHIL NFR BLD AUTO: 4 %
EOSINOPHIL NFR BLD AUTO: 4 %
EOSINOPHIL NFR BLD AUTO: 5 %
ERYTHROCYTE [DISTWIDTH] IN BLOOD BY AUTOMATED COUNT: 13.5 % (ref 10–15)
ERYTHROCYTE [DISTWIDTH] IN BLOOD BY AUTOMATED COUNT: 13.5 % (ref 10–15)
ERYTHROCYTE [DISTWIDTH] IN BLOOD BY AUTOMATED COUNT: 13.6 % (ref 10–15)
ERYTHROCYTE [DISTWIDTH] IN BLOOD BY AUTOMATED COUNT: 13.6 % (ref 10–15)
ERYTHROCYTE [DISTWIDTH] IN BLOOD BY AUTOMATED COUNT: 13.7 % (ref 10–15)
ERYTHROCYTE [DISTWIDTH] IN BLOOD BY AUTOMATED COUNT: 13.7 % (ref 10–15)
ERYTHROCYTE [DISTWIDTH] IN BLOOD BY AUTOMATED COUNT: 13.8 % (ref 10–15)
ERYTHROCYTE [DISTWIDTH] IN BLOOD BY AUTOMATED COUNT: 13.9 % (ref 10–15)
ERYTHROCYTE [DISTWIDTH] IN BLOOD BY AUTOMATED COUNT: 13.9 % (ref 10–15)
ERYTHROCYTE [DISTWIDTH] IN BLOOD BY AUTOMATED COUNT: 14 % (ref 10–15)
ERYTHROCYTE [DISTWIDTH] IN BLOOD BY AUTOMATED COUNT: 14 % (ref 10–15)
ERYTHROCYTE [DISTWIDTH] IN BLOOD BY AUTOMATED COUNT: 14.1 % (ref 10–15)
ERYTHROCYTE [DISTWIDTH] IN BLOOD BY AUTOMATED COUNT: 14.3 % (ref 10–15)
ERYTHROCYTE [DISTWIDTH] IN BLOOD BY AUTOMATED COUNT: 14.4 % (ref 10–15)
ERYTHROCYTE [DISTWIDTH] IN BLOOD BY AUTOMATED COUNT: 14.7 % (ref 10–15)
ETHANOL SERPL-MCNC: <0.01 G/DL
FERRITIN SERPL-MCNC: 157 NG/ML (ref 11–328)
FERRITIN SERPL-MCNC: 258 NG/ML (ref 11–328)
FLUAV RNA SPEC QL NAA+PROBE: NEGATIVE
FLUBV RNA RESP QL NAA+PROBE: NEGATIVE
FOLATE SERPL-MCNC: 11.6 NG/ML (ref 4.6–34.8)
FOLATE SERPL-MCNC: 34.1 NG/ML (ref 4.6–34.8)
GFR SERPL CREATININE-BSD FRML MDRD: 42 ML/MIN/1.73M2
GFR SERPL CREATININE-BSD FRML MDRD: 46 ML/MIN/1.73M2
GFR SERPL CREATININE-BSD FRML MDRD: 56 ML/MIN/1.73M2
GFR SERPL CREATININE-BSD FRML MDRD: 56 ML/MIN/1.73M2
GFR SERPL CREATININE-BSD FRML MDRD: 58 ML/MIN/1.73M2
GFR SERPL CREATININE-BSD FRML MDRD: 67 ML/MIN/1.73M2
GFR SERPL CREATININE-BSD FRML MDRD: 70 ML/MIN/1.73M2
GFR SERPL CREATININE-BSD FRML MDRD: 76 ML/MIN/1.73M2
GFR SERPL CREATININE-BSD FRML MDRD: 77 ML/MIN/1.73M2
GFR SERPL CREATININE-BSD FRML MDRD: 83 ML/MIN/1.73M2
GFR SERPL CREATININE-BSD FRML MDRD: 85 ML/MIN/1.73M2
GFR SERPL CREATININE-BSD FRML MDRD: 86 ML/MIN/1.73M2
GFR SERPL CREATININE-BSD FRML MDRD: 87 ML/MIN/1.73M2
GFR SERPL CREATININE-BSD FRML MDRD: 87 ML/MIN/1.73M2
GFR SERPL CREATININE-BSD FRML MDRD: 88 ML/MIN/1.73M2
GFR SERPL CREATININE-BSD FRML MDRD: 88 ML/MIN/1.73M2
GFR SERPL CREATININE-BSD FRML MDRD: >90 ML/MIN/1.73M2
GLUCOSE BLDC GLUCOMTR-MCNC: 107 MG/DL (ref 70–99)
GLUCOSE BLDC GLUCOMTR-MCNC: 109 MG/DL (ref 70–99)
GLUCOSE BLDC GLUCOMTR-MCNC: 116 MG/DL (ref 70–99)
GLUCOSE BLDC GLUCOMTR-MCNC: 126 MG/DL (ref 70–99)
GLUCOSE BLDC GLUCOMTR-MCNC: 143 MG/DL (ref 70–99)
GLUCOSE BLDC GLUCOMTR-MCNC: 151 MG/DL (ref 70–99)
GLUCOSE BLDC GLUCOMTR-MCNC: 223 MG/DL (ref 70–99)
GLUCOSE BLDC GLUCOMTR-MCNC: 50 MG/DL (ref 70–99)
GLUCOSE BLDC GLUCOMTR-MCNC: 55 MG/DL (ref 70–99)
GLUCOSE BLDC GLUCOMTR-MCNC: 73 MG/DL (ref 70–99)
GLUCOSE BLDC GLUCOMTR-MCNC: 75 MG/DL (ref 70–99)
GLUCOSE BLDC GLUCOMTR-MCNC: 77 MG/DL (ref 70–99)
GLUCOSE BLDC GLUCOMTR-MCNC: 77 MG/DL (ref 70–99)
GLUCOSE BLDC GLUCOMTR-MCNC: 80 MG/DL (ref 70–99)
GLUCOSE BLDC GLUCOMTR-MCNC: 88 MG/DL (ref 70–99)
GLUCOSE BLDC GLUCOMTR-MCNC: 95 MG/DL (ref 70–99)
GLUCOSE BLDC GLUCOMTR-MCNC: 96 MG/DL (ref 70–99)
GLUCOSE BLDC GLUCOMTR-MCNC: 99 MG/DL (ref 70–99)
GLUCOSE SERPL-MCNC: 100 MG/DL (ref 70–99)
GLUCOSE SERPL-MCNC: 100 MG/DL (ref 70–99)
GLUCOSE SERPL-MCNC: 101 MG/DL (ref 70–99)
GLUCOSE SERPL-MCNC: 103 MG/DL (ref 70–99)
GLUCOSE SERPL-MCNC: 104 MG/DL (ref 70–99)
GLUCOSE SERPL-MCNC: 107 MG/DL (ref 70–99)
GLUCOSE SERPL-MCNC: 111 MG/DL (ref 70–99)
GLUCOSE SERPL-MCNC: 123 MG/DL (ref 70–99)
GLUCOSE SERPL-MCNC: 128 MG/DL (ref 70–99)
GLUCOSE SERPL-MCNC: 140 MG/DL (ref 70–99)
GLUCOSE SERPL-MCNC: 147 MG/DL (ref 70–99)
GLUCOSE SERPL-MCNC: 204 MG/DL (ref 70–99)
GLUCOSE SERPL-MCNC: 211 MG/DL (ref 70–99)
GLUCOSE SERPL-MCNC: 64 MG/DL (ref 70–99)
GLUCOSE SERPL-MCNC: 69 MG/DL (ref 70–99)
GLUCOSE SERPL-MCNC: 77 MG/DL (ref 70–99)
GLUCOSE SERPL-MCNC: 80 MG/DL (ref 70–99)
GLUCOSE SERPL-MCNC: 85 MG/DL (ref 70–99)
GLUCOSE SERPL-MCNC: 86 MG/DL (ref 70–99)
GLUCOSE SERPL-MCNC: 91 MG/DL (ref 70–99)
GLUCOSE SERPL-MCNC: 93 MG/DL (ref 70–99)
GLUCOSE SERPL-MCNC: 95 MG/DL (ref 70–99)
GLUCOSE SERPL-MCNC: 96 MG/DL (ref 70–99)
GLUCOSE SERPL-MCNC: 97 MG/DL (ref 70–99)
GLUCOSE UR STRIP-MCNC: 50 MG/DL
GLUCOSE UR STRIP-MCNC: NEGATIVE MG/DL
HBA1C MFR BLD: 5.1 %
HCO3 BLD-SCNC: 21 MMOL/L (ref 21–28)
HCO3 BLDV-SCNC: 21 MMOL/L (ref 21–28)
HCO3 BLDV-SCNC: 24 MMOL/L (ref 21–28)
HCO3 BLDV-SCNC: 24 MMOL/L (ref 21–28)
HCO3 BLDV-SCNC: 27 MMOL/L (ref 21–28)
HCO3 BLDV-SCNC: 28 MMOL/L (ref 21–28)
HCO3 BLDV-SCNC: 30 MMOL/L (ref 21–28)
HCT VFR BLD AUTO: 23.4 % (ref 35–47)
HCT VFR BLD AUTO: 25.2 % (ref 35–47)
HCT VFR BLD AUTO: 26.5 % (ref 35–47)
HCT VFR BLD AUTO: 26.6 % (ref 35–47)
HCT VFR BLD AUTO: 26.8 % (ref 35–47)
HCT VFR BLD AUTO: 26.9 % (ref 35–47)
HCT VFR BLD AUTO: 27.3 % (ref 35–47)
HCT VFR BLD AUTO: 29.4 % (ref 35–47)
HCT VFR BLD AUTO: 29.8 % (ref 35–47)
HCT VFR BLD AUTO: 30.4 % (ref 35–47)
HCT VFR BLD AUTO: 31.1 % (ref 35–47)
HCT VFR BLD AUTO: 31.2 % (ref 35–47)
HCT VFR BLD AUTO: 31.4 % (ref 35–47)
HCT VFR BLD AUTO: 31.7 % (ref 35–47)
HCT VFR BLD AUTO: 32.3 % (ref 35–47)
HCT VFR BLD AUTO: 33.4 % (ref 35–47)
HCT VFR BLD AUTO: 33.6 % (ref 35–47)
HCT VFR BLD AUTO: 34 % (ref 35–47)
HCT VFR BLD AUTO: 34.7 % (ref 35–47)
HCT VFR BLD AUTO: 37.8 % (ref 35–47)
HEMOCCULT STL QL: POSITIVE
HGB BLD-MCNC: 10 G/DL (ref 11.7–15.7)
HGB BLD-MCNC: 10 G/DL (ref 11.7–15.7)
HGB BLD-MCNC: 10.1 G/DL (ref 11.7–15.7)
HGB BLD-MCNC: 10.1 G/DL (ref 11.7–15.7)
HGB BLD-MCNC: 10.4 G/DL (ref 11.7–15.7)
HGB BLD-MCNC: 10.5 G/DL (ref 11.7–15.7)
HGB BLD-MCNC: 10.8 G/DL (ref 11.7–15.7)
HGB BLD-MCNC: 10.9 G/DL (ref 11.7–15.7)
HGB BLD-MCNC: 11.8 G/DL (ref 11.7–15.7)
HGB BLD-MCNC: 7.6 G/DL (ref 11.7–15.7)
HGB BLD-MCNC: 8.2 G/DL (ref 11.7–15.7)
HGB BLD-MCNC: 8.3 G/DL (ref 11.7–15.7)
HGB BLD-MCNC: 8.4 G/DL (ref 11.7–15.7)
HGB BLD-MCNC: 8.7 G/DL (ref 11.7–15.7)
HGB BLD-MCNC: 8.7 G/DL (ref 11.7–15.7)
HGB BLD-MCNC: 8.9 G/DL (ref 11.7–15.7)
HGB BLD-MCNC: 9 G/DL (ref 11.7–15.7)
HGB BLD-MCNC: 9 G/DL (ref 11.7–15.7)
HGB BLD-MCNC: 9.7 G/DL (ref 11.7–15.7)
HGB BLD-MCNC: 9.7 G/DL (ref 11.7–15.7)
HGB BLD-MCNC: 9.8 G/DL (ref 11.7–15.7)
HGB BLD-MCNC: 9.8 G/DL (ref 11.7–15.7)
HGB BLD-MCNC: 9.9 G/DL (ref 11.7–15.7)
HGB UR QL STRIP: ABNORMAL
HGB UR QL STRIP: NEGATIVE
HOLD SPECIMEN: NORMAL
HYALINE CASTS: 2 /LPF
HYALINE CASTS: 4 /LPF
HYALINE CASTS: 5 /LPF
HYALINE CASTS: 6 /LPF
IMM GRANULOCYTES # BLD: 0 10E3/UL
IMM GRANULOCYTES # BLD: 0 10E3/UL
IMM GRANULOCYTES # BLD: 0.1 10E3/UL
IMM GRANULOCYTES NFR BLD: 0 %
IMM GRANULOCYTES NFR BLD: 1 %
INR PPP: 1.07 (ref 0.85–1.15)
INR PPP: 1.08 (ref 0.85–1.15)
INTERPRETATION ECG - MUSE: NORMAL
IRON BINDING CAPACITY (ROCHE): 248 UG/DL (ref 240–430)
IRON BINDING CAPACITY (ROCHE): 254 UG/DL (ref 240–430)
IRON SATN MFR SERPL: 19 % (ref 15–46)
IRON SATN MFR SERPL: 6 % (ref 15–46)
IRON SERPL-MCNC: 16 UG/DL (ref 37–145)
IRON SERPL-MCNC: 49 UG/DL (ref 37–145)
KETONES UR STRIP-MCNC: NEGATIVE MG/DL
LACTATE BLD-SCNC: 0.3 MMOL/L
LACTATE BLD-SCNC: 1 MMOL/L
LACTATE BLD-SCNC: 5.4 MMOL/L
LACTATE SERPL-SCNC: 0.8 MMOL/L (ref 0.7–2)
LACTATE SERPL-SCNC: 1 MMOL/L (ref 0.7–2)
LACTATE SERPL-SCNC: 1.2 MMOL/L (ref 0.7–2)
LACTATE SERPL-SCNC: 1.4 MMOL/L (ref 0.7–2)
LACTATE SERPL-SCNC: 1.5 MMOL/L (ref 0.7–2)
LACTATE SERPL-SCNC: 1.5 MMOL/L (ref 0.7–2)
LACTATE SERPL-SCNC: 1.7 MMOL/L (ref 0.7–2)
LACTATE SERPL-SCNC: 1.8 MMOL/L (ref 0.7–2)
LACTATE SERPL-SCNC: 3.4 MMOL/L (ref 0.7–2)
LACTATE SERPL-SCNC: 4 MMOL/L (ref 0.7–2)
LEUKOCYTE ESTERASE UR QL STRIP: ABNORMAL
LEUKOCYTE ESTERASE UR QL STRIP: NEGATIVE
LEUKOCYTE ESTERASE UR QL STRIP: NEGATIVE
LVEF ECHO: NORMAL
LVEF ECHO: NORMAL
LYMPHOCYTES # BLD AUTO: 1.2 10E3/UL (ref 0.8–5.3)
LYMPHOCYTES # BLD AUTO: 1.3 10E3/UL (ref 0.8–5.3)
LYMPHOCYTES # BLD AUTO: 1.6 10E3/UL (ref 0.8–5.3)
LYMPHOCYTES # BLD AUTO: 1.8 10E3/UL (ref 0.8–5.3)
LYMPHOCYTES # BLD AUTO: 2.7 10E3/UL (ref 0.8–5.3)
LYMPHOCYTES # BLD AUTO: 2.7 10E3/UL (ref 0.8–5.3)
LYMPHOCYTES NFR BLD AUTO: 10 %
LYMPHOCYTES NFR BLD AUTO: 15 %
LYMPHOCYTES NFR BLD AUTO: 15 %
LYMPHOCYTES NFR BLD AUTO: 16 %
LYMPHOCYTES NFR BLD AUTO: 29 %
LYMPHOCYTES NFR BLD AUTO: 9 %
MAGNESIUM SERPL-MCNC: 1.2 MG/DL (ref 1.7–2.3)
MAGNESIUM SERPL-MCNC: 1.5 MG/DL (ref 1.7–2.3)
MAGNESIUM SERPL-MCNC: 1.6 MG/DL (ref 1.7–2.3)
MAGNESIUM SERPL-MCNC: 1.6 MG/DL (ref 1.7–2.3)
MAGNESIUM SERPL-MCNC: 1.7 MG/DL (ref 1.7–2.3)
MAGNESIUM SERPL-MCNC: 1.8 MG/DL (ref 1.7–2.3)
MAGNESIUM SERPL-MCNC: 1.9 MG/DL (ref 1.7–2.3)
MAGNESIUM SERPL-MCNC: 2.2 MG/DL (ref 1.7–2.3)
MAGNESIUM SERPL-MCNC: 2.2 MG/DL (ref 1.7–2.3)
MAGNESIUM SERPL-MCNC: 2.3 MG/DL (ref 1.7–2.3)
MAGNESIUM SERPL-MCNC: 2.3 MG/DL (ref 1.7–2.3)
MAGNESIUM SERPL-MCNC: 2.6 MG/DL (ref 1.7–2.3)
MAGNESIUM SERPL-MCNC: 2.6 MG/DL (ref 1.7–2.3)
MCH RBC QN AUTO: 31.1 PG (ref 26.5–33)
MCH RBC QN AUTO: 31.4 PG (ref 26.5–33)
MCH RBC QN AUTO: 31.5 PG (ref 26.5–33)
MCH RBC QN AUTO: 31.7 PG (ref 26.5–33)
MCH RBC QN AUTO: 31.9 PG (ref 26.5–33)
MCH RBC QN AUTO: 32.1 PG (ref 26.5–33)
MCH RBC QN AUTO: 32.2 PG (ref 26.5–33)
MCH RBC QN AUTO: 32.2 PG (ref 26.5–33)
MCH RBC QN AUTO: 32.3 PG (ref 26.5–33)
MCH RBC QN AUTO: 32.4 PG (ref 26.5–33)
MCH RBC QN AUTO: 32.5 PG (ref 26.5–33)
MCH RBC QN AUTO: 32.6 PG (ref 26.5–33)
MCH RBC QN AUTO: 32.6 PG (ref 26.5–33)
MCH RBC QN AUTO: 32.7 PG (ref 26.5–33)
MCHC RBC AUTO-ENTMCNC: 30.6 G/DL (ref 31.5–36.5)
MCHC RBC AUTO-ENTMCNC: 30.9 G/DL (ref 31.5–36.5)
MCHC RBC AUTO-ENTMCNC: 31 G/DL (ref 31.5–36.5)
MCHC RBC AUTO-ENTMCNC: 31.1 G/DL (ref 31.5–36.5)
MCHC RBC AUTO-ENTMCNC: 31.2 G/DL (ref 31.5–36.5)
MCHC RBC AUTO-ENTMCNC: 31.2 G/DL (ref 31.5–36.5)
MCHC RBC AUTO-ENTMCNC: 31.3 G/DL (ref 31.5–36.5)
MCHC RBC AUTO-ENTMCNC: 31.3 G/DL (ref 31.5–36.5)
MCHC RBC AUTO-ENTMCNC: 31.6 G/DL (ref 31.5–36.5)
MCHC RBC AUTO-ENTMCNC: 31.9 G/DL (ref 31.5–36.5)
MCHC RBC AUTO-ENTMCNC: 32.2 G/DL (ref 31.5–36.5)
MCHC RBC AUTO-ENTMCNC: 32.2 G/DL (ref 31.5–36.5)
MCHC RBC AUTO-ENTMCNC: 32.4 G/DL (ref 31.5–36.5)
MCHC RBC AUTO-ENTMCNC: 32.5 G/DL (ref 31.5–36.5)
MCHC RBC AUTO-ENTMCNC: 32.5 G/DL (ref 31.5–36.5)
MCHC RBC AUTO-ENTMCNC: 32.6 G/DL (ref 31.5–36.5)
MCHC RBC AUTO-ENTMCNC: 32.8 G/DL (ref 31.5–36.5)
MCHC RBC AUTO-ENTMCNC: 32.9 G/DL (ref 31.5–36.5)
MCHC RBC AUTO-ENTMCNC: 33 G/DL (ref 31.5–36.5)
MCHC RBC AUTO-ENTMCNC: 33.7 G/DL (ref 31.5–36.5)
MCV RBC AUTO: 100 FL (ref 78–100)
MCV RBC AUTO: 102 FL (ref 78–100)
MCV RBC AUTO: 102 FL (ref 78–100)
MCV RBC AUTO: 103 FL (ref 78–100)
MCV RBC AUTO: 106 FL (ref 78–100)
MCV RBC AUTO: 96 FL (ref 78–100)
MCV RBC AUTO: 97 FL (ref 78–100)
MCV RBC AUTO: 98 FL (ref 78–100)
MCV RBC AUTO: 99 FL (ref 78–100)
MCV RBC AUTO: 99 FL (ref 78–100)
MONOCYTES # BLD AUTO: 0.6 10E3/UL (ref 0–1.3)
MONOCYTES # BLD AUTO: 1 10E3/UL (ref 0–1.3)
MONOCYTES # BLD AUTO: 1.1 10E3/UL (ref 0–1.3)
MONOCYTES # BLD AUTO: 1.2 10E3/UL (ref 0–1.3)
MONOCYTES # BLD AUTO: 1.2 10E3/UL (ref 0–1.3)
MONOCYTES # BLD AUTO: 1.7 10E3/UL (ref 0–1.3)
MONOCYTES NFR BLD AUTO: 10 %
MONOCYTES NFR BLD AUTO: 11 %
MONOCYTES NFR BLD AUTO: 12 %
MONOCYTES NFR BLD AUTO: 6 %
MONOCYTES NFR BLD AUTO: 6 %
MONOCYTES NFR BLD AUTO: 9 %
MRSA DNA SPEC QL NAA+PROBE: NEGATIVE
MUCOUS THREADS #/AREA URNS LPF: PRESENT /LPF
NEUTROPHILS # BLD AUTO: 11.9 10E3/UL (ref 1.6–8.3)
NEUTROPHILS # BLD AUTO: 13.1 10E3/UL (ref 1.6–8.3)
NEUTROPHILS # BLD AUTO: 5 10E3/UL (ref 1.6–8.3)
NEUTROPHILS # BLD AUTO: 8.1 10E3/UL (ref 1.6–8.3)
NEUTROPHILS # BLD AUTO: 8.1 10E3/UL (ref 1.6–8.3)
NEUTROPHILS # BLD AUTO: 9.9 10E3/UL (ref 1.6–8.3)
NEUTROPHILS NFR BLD AUTO: 53 %
NEUTROPHILS NFR BLD AUTO: 69 %
NEUTROPHILS NFR BLD AUTO: 73 %
NEUTROPHILS NFR BLD AUTO: 76 %
NEUTROPHILS NFR BLD AUTO: 77 %
NEUTROPHILS NFR BLD AUTO: 77 %
NITRATE UR QL: NEGATIVE
NITRATE UR QL: POSITIVE
NRBC # BLD AUTO: 0 10E3/UL
NRBC BLD AUTO-RTO: 0 /100
NT-PROBNP SERPL-MCNC: 1332 PG/ML (ref 0–1800)
NT-PROBNP SERPL-MCNC: 1398 PG/ML (ref 0–1800)
NT-PROBNP SERPL-MCNC: 1690 PG/ML (ref 0–1800)
NT-PROBNP SERPL-MCNC: 1885 PG/ML (ref 0–1800)
NT-PROBNP SERPL-MCNC: 4763 PG/ML (ref 0–1800)
NT-PROBNP SERPL-MCNC: 4765 PG/ML (ref 0–1800)
NT-PROBNP SERPL-MCNC: 5191 PG/ML (ref 0–1800)
NT-PROBNP SERPL-MCNC: 5460 PG/ML (ref 0–1800)
NT-PROBNP SERPL-MCNC: 7042 PG/ML (ref 0–1800)
NUC STRESS EJECTION FRACTION: 63 %
O2/TOTAL GAS SETTING VFR VENT: 30 %
O2/TOTAL GAS SETTING VFR VENT: 30 %
O2/TOTAL GAS SETTING VFR VENT: 60 %
O2/TOTAL GAS SETTING VFR VENT: 60 %
OPIATES UR QL SCN: NORMAL
OXYHGB MFR BLD: 97 % (ref 92–100)
OXYHGB MFR BLDV: 22 % (ref 70–75)
P AXIS - MUSE: 18 DEGREES
P AXIS - MUSE: 25 DEGREES
P AXIS - MUSE: 29 DEGREES
P AXIS - MUSE: 33 DEGREES
P AXIS - MUSE: 42 DEGREES
P AXIS - MUSE: 65 DEGREES
PCO2 BLD: 34 MM HG (ref 35–45)
PCO2 BLDV: 44 MM HG (ref 40–50)
PCO2 BLDV: 47 MM HG (ref 40–50)
PCO2 BLDV: 48 MM HG (ref 40–50)
PCO2 BLDV: 49 MM HG (ref 40–50)
PCO2 BLDV: 54 MM HG (ref 40–50)
PCO2 BLDV: 57 MM HG (ref 40–50)
PCP QUAL URINE (ROCHE): NORMAL
PH BLD: 7.4 [PH] (ref 7.35–7.45)
PH BLDV: 7.2 [PH] (ref 7.32–7.43)
PH BLDV: 7.23 [PH] (ref 7.32–7.43)
PH BLDV: 7.32 [PH] (ref 7.32–7.43)
PH BLDV: 7.37 [PH] (ref 7.32–7.43)
PH BLDV: 7.4 [PH] (ref 7.32–7.43)
PH BLDV: 7.41 [PH] (ref 7.32–7.43)
PH UR STRIP: 5 [PH] (ref 5–7)
PH UR STRIP: 5.5 [PH] (ref 5–7)
PH UR STRIP: 6 [PH] (ref 5–7)
PHOSPHATE SERPL-MCNC: 2.9 MG/DL (ref 2.5–4.5)
PHOSPHATE SERPL-MCNC: 3 MG/DL (ref 2.5–4.5)
PHOSPHATE SERPL-MCNC: 3.5 MG/DL (ref 2.5–4.5)
PHOSPHATE SERPL-MCNC: 3.9 MG/DL (ref 2.5–4.5)
PHOSPHATE SERPL-MCNC: 4 MG/DL (ref 2.5–4.5)
PHOSPHATE SERPL-MCNC: 5 MG/DL (ref 2.5–4.5)
PLATELET # BLD AUTO: 162 10E3/UL (ref 150–450)
PLATELET # BLD AUTO: 195 10E3/UL (ref 150–450)
PLATELET # BLD AUTO: 197 10E3/UL (ref 150–450)
PLATELET # BLD AUTO: 199 10E3/UL (ref 150–450)
PLATELET # BLD AUTO: 202 10E3/UL (ref 150–450)
PLATELET # BLD AUTO: 202 10E3/UL (ref 150–450)
PLATELET # BLD AUTO: 207 10E3/UL (ref 150–450)
PLATELET # BLD AUTO: 207 10E3/UL (ref 150–450)
PLATELET # BLD AUTO: 214 10E3/UL (ref 150–450)
PLATELET # BLD AUTO: 224 10E3/UL (ref 150–450)
PLATELET # BLD AUTO: 226 10E3/UL (ref 150–450)
PLATELET # BLD AUTO: 230 10E3/UL (ref 150–450)
PLATELET # BLD AUTO: 233 10E3/UL (ref 150–450)
PLATELET # BLD AUTO: 234 10E3/UL (ref 150–450)
PLATELET # BLD AUTO: 239 10E3/UL (ref 150–450)
PLATELET # BLD AUTO: 239 10E3/UL (ref 150–450)
PLATELET # BLD AUTO: 247 10E3/UL (ref 150–450)
PLATELET # BLD AUTO: 251 10E3/UL (ref 150–450)
PLATELET # BLD AUTO: 255 10E3/UL (ref 150–450)
PLATELET # BLD AUTO: 267 10E3/UL (ref 150–450)
PLATELET # BLD AUTO: 307 10E3/UL (ref 150–450)
PO2 BLD: 170 MM HG (ref 80–105)
PO2 BLDV: 20 MM HG (ref 25–47)
PO2 BLDV: 23 MM HG (ref 25–47)
PO2 BLDV: 36 MM HG (ref 25–47)
PO2 BLDV: 40 MM HG (ref 25–47)
PO2 BLDV: 46 MM HG (ref 25–47)
PO2 BLDV: 86 MM HG (ref 25–47)
POTASSIUM SERPL-SCNC: 3.2 MMOL/L (ref 3.4–5.3)
POTASSIUM SERPL-SCNC: 3.3 MMOL/L (ref 3.4–5.3)
POTASSIUM SERPL-SCNC: 3.5 MMOL/L (ref 3.4–5.3)
POTASSIUM SERPL-SCNC: 3.5 MMOL/L (ref 3.4–5.3)
POTASSIUM SERPL-SCNC: 3.6 MMOL/L (ref 3.4–5.3)
POTASSIUM SERPL-SCNC: 3.7 MMOL/L (ref 3.4–5.3)
POTASSIUM SERPL-SCNC: 3.8 MMOL/L (ref 3.4–5.3)
POTASSIUM SERPL-SCNC: 3.8 MMOL/L (ref 3.4–5.3)
POTASSIUM SERPL-SCNC: 3.9 MMOL/L (ref 3.4–5.3)
POTASSIUM SERPL-SCNC: 4.1 MMOL/L (ref 3.4–5.3)
POTASSIUM SERPL-SCNC: 4.1 MMOL/L (ref 3.4–5.3)
POTASSIUM SERPL-SCNC: 4.2 MMOL/L (ref 3.4–5.3)
POTASSIUM SERPL-SCNC: 4.3 MMOL/L (ref 3.4–5.3)
POTASSIUM SERPL-SCNC: 4.4 MMOL/L (ref 3.4–5.3)
POTASSIUM SERPL-SCNC: 4.5 MMOL/L (ref 3.4–5.3)
POTASSIUM SERPL-SCNC: 4.6 MMOL/L (ref 3.4–5.3)
POTASSIUM SERPL-SCNC: 4.6 MMOL/L (ref 3.4–5.3)
PR INTERVAL - MUSE: 114 MS
PR INTERVAL - MUSE: 128 MS
PR INTERVAL - MUSE: 132 MS
PR INTERVAL - MUSE: 148 MS
PR INTERVAL - MUSE: 152 MS
PR INTERVAL - MUSE: 156 MS
PROCALCITONIN SERPL IA-MCNC: 0.12 NG/ML
PROCALCITONIN SERPL IA-MCNC: 0.13 NG/ML
PROCALCITONIN SERPL IA-MCNC: 0.17 NG/ML
PROCALCITONIN SERPL IA-MCNC: 0.17 NG/ML
PROT SERPL-MCNC: 5.5 G/DL (ref 6.4–8.3)
PROT SERPL-MCNC: 6.4 G/DL (ref 6.4–8.3)
PROT SERPL-MCNC: 6.5 G/DL (ref 6.4–8.3)
PROT SERPL-MCNC: 6.9 G/DL (ref 6.4–8.3)
PROT SERPL-MCNC: 7.2 G/DL (ref 6.4–8.3)
PROT SERPL-MCNC: 7.4 G/DL (ref 6.4–8.3)
QRS DURATION - MUSE: 80 MS
QRS DURATION - MUSE: 82 MS
QRS DURATION - MUSE: 82 MS
QRS DURATION - MUSE: 84 MS
QRS DURATION - MUSE: 86 MS
QRS DURATION - MUSE: 88 MS
QT - MUSE: 244 MS
QT - MUSE: 324 MS
QT - MUSE: 372 MS
QT - MUSE: 384 MS
QT - MUSE: 388 MS
QT - MUSE: 398 MS
QTC - MUSE: 386 MS
QTC - MUSE: 436 MS
QTC - MUSE: 445 MS
QTC - MUSE: 461 MS
QTC - MUSE: 462 MS
QTC - MUSE: 485 MS
R AXIS - MUSE: 15 DEGREES
R AXIS - MUSE: 22 DEGREES
R AXIS - MUSE: 25 DEGREES
R AXIS - MUSE: 35 DEGREES
R AXIS - MUSE: 57 DEGREES
R AXIS - MUSE: 8 DEGREES
RATE PRESSURE PRODUCT: NORMAL
RBC # BLD AUTO: 2.33 10E6/UL (ref 3.8–5.2)
RBC # BLD AUTO: 2.55 10E6/UL (ref 3.8–5.2)
RBC # BLD AUTO: 2.6 10E6/UL (ref 3.8–5.2)
RBC # BLD AUTO: 2.65 10E6/UL (ref 3.8–5.2)
RBC # BLD AUTO: 2.65 10E6/UL (ref 3.8–5.2)
RBC # BLD AUTO: 2.71 10E6/UL (ref 3.8–5.2)
RBC # BLD AUTO: 2.8 10E6/UL (ref 3.8–5.2)
RBC # BLD AUTO: 3.05 10E6/UL (ref 3.8–5.2)
RBC # BLD AUTO: 3.06 10E6/UL (ref 3.8–5.2)
RBC # BLD AUTO: 3.07 10E6/UL (ref 3.8–5.2)
RBC # BLD AUTO: 3.07 10E6/UL (ref 3.8–5.2)
RBC # BLD AUTO: 3.09 10E6/UL (ref 3.8–5.2)
RBC # BLD AUTO: 3.12 10E6/UL (ref 3.8–5.2)
RBC # BLD AUTO: 3.13 10E6/UL (ref 3.8–5.2)
RBC # BLD AUTO: 3.14 10E6/UL (ref 3.8–5.2)
RBC # BLD AUTO: 3.3 10E6/UL (ref 3.8–5.2)
RBC # BLD AUTO: 3.31 10E6/UL (ref 3.8–5.2)
RBC # BLD AUTO: 3.34 10E6/UL (ref 3.8–5.2)
RBC # BLD AUTO: 3.36 10E6/UL (ref 3.8–5.2)
RBC # BLD AUTO: 3.8 10E6/UL (ref 3.8–5.2)
RBC URINE: 0 /HPF
RBC URINE: 1 /HPF
RBC URINE: 2 /HPF
RBC URINE: 2 /HPF
RBC URINE: <1 /HPF
RENAL TUB EPI: 1 /HPF
RSV RNA SPEC NAA+PROBE: NEGATIVE
SA TARGET DNA: NEGATIVE
SALICYLATES SERPL-MCNC: <0.3 MG/DL
SAO2 % BLDV: 31 % (ref 94–100)
SAO2 % BLDV: 68 % (ref 94–100)
SAO2 % BLDV: 94 % (ref 94–100)
SARS-COV-2 RNA RESP QL NAA+PROBE: NEGATIVE
SODIUM SERPL-SCNC: 128 MMOL/L (ref 135–145)
SODIUM SERPL-SCNC: 130 MMOL/L (ref 136–145)
SODIUM SERPL-SCNC: 131 MMOL/L (ref 135–145)
SODIUM SERPL-SCNC: 131 MMOL/L (ref 136–145)
SODIUM SERPL-SCNC: 132 MMOL/L (ref 135–145)
SODIUM SERPL-SCNC: 132 MMOL/L (ref 135–145)
SODIUM SERPL-SCNC: 132 MMOL/L (ref 136–145)
SODIUM SERPL-SCNC: 133 MMOL/L (ref 135–145)
SODIUM SERPL-SCNC: 133 MMOL/L (ref 136–145)
SODIUM SERPL-SCNC: 134 MMOL/L (ref 135–145)
SODIUM SERPL-SCNC: 134 MMOL/L (ref 136–145)
SODIUM SERPL-SCNC: 135 MMOL/L (ref 135–145)
SODIUM SERPL-SCNC: 135 MMOL/L (ref 135–145)
SODIUM SERPL-SCNC: 135 MMOL/L (ref 136–145)
SODIUM SERPL-SCNC: 135 MMOL/L (ref 136–145)
SODIUM SERPL-SCNC: 136 MMOL/L (ref 135–145)
SODIUM SERPL-SCNC: 136 MMOL/L (ref 135–145)
SODIUM SERPL-SCNC: 136 MMOL/L (ref 136–145)
SODIUM SERPL-SCNC: 138 MMOL/L (ref 136–145)
SP GR UR STRIP: 1.01 (ref 1–1.03)
SP GR UR STRIP: 1.02 (ref 1–1.03)
SP GR UR STRIP: 1.02 (ref 1–1.03)
SQUAMOUS EPITHELIAL: 1 /HPF
SQUAMOUS EPITHELIAL: <1 /HPF
STRESS ECHO BASELINE DIASTOLIC HE: 66
STRESS ECHO BASELINE HR: 77 BPM
STRESS ECHO BASELINE SYSTOLIC BP: 142
STRESS ECHO CALCULATED PERCENT HR: 93 %
STRESS ECHO LAST STRESS DIASTOLIC BP: 74
STRESS ECHO LAST STRESS SYSTOLIC BP: 153
STRESS ECHO TARGET HR: 137
STRESS/REST PERFUSION RATIO: 0.91
SYSTOLIC BLOOD PRESSURE - MUSE: NORMAL MMHG
T AXIS - MUSE: 114 DEGREES
T AXIS - MUSE: 115 DEGREES
T AXIS - MUSE: 121 DEGREES
T AXIS - MUSE: 124 DEGREES
T AXIS - MUSE: 136 DEGREES
T AXIS - MUSE: 84 DEGREES
TRANSITIONAL EPI: 1 /HPF
TRANSITIONAL EPI: 1 /HPF
TROPONIN T SERPL HS-MCNC: 107 NG/L
TROPONIN T SERPL HS-MCNC: 113 NG/L
TROPONIN T SERPL HS-MCNC: 36 NG/L
TROPONIN T SERPL HS-MCNC: 40 NG/L
TROPONIN T SERPL HS-MCNC: 49 NG/L
TROPONIN T SERPL HS-MCNC: 84 NG/L
TROPONIN T SERPL HS-MCNC: 86 NG/L
TSH SERPL DL<=0.005 MIU/L-ACNC: 1.07 UIU/ML (ref 0.3–4.2)
TSH SERPL DL<=0.005 MIU/L-ACNC: 1.98 UIU/ML (ref 0.3–4.2)
UROBILINOGEN UR STRIP-MCNC: NORMAL MG/DL
VENTRICULAR RATE- MUSE: 109 BPM
VENTRICULAR RATE- MUSE: 151 BPM
VENTRICULAR RATE- MUSE: 81 BPM
VENTRICULAR RATE- MUSE: 85 BPM
VENTRICULAR RATE- MUSE: 86 BPM
VENTRICULAR RATE- MUSE: 96 BPM
VIT B12 SERPL-MCNC: 264 PG/ML (ref 232–1245)
VIT B12 SERPL-MCNC: 281 PG/ML (ref 232–1245)
WBC # BLD AUTO: 10.1 10E3/UL (ref 4–11)
WBC # BLD AUTO: 10.2 10E3/UL (ref 4–11)
WBC # BLD AUTO: 10.6 10E3/UL (ref 4–11)
WBC # BLD AUTO: 10.9 10E3/UL (ref 4–11)
WBC # BLD AUTO: 11 10E3/UL (ref 4–11)
WBC # BLD AUTO: 11.3 10E3/UL (ref 4–11)
WBC # BLD AUTO: 11.5 10E3/UL (ref 4–11)
WBC # BLD AUTO: 11.9 10E3/UL (ref 4–11)
WBC # BLD AUTO: 12.6 10E3/UL (ref 4–11)
WBC # BLD AUTO: 13.4 10E3/UL (ref 4–11)
WBC # BLD AUTO: 14.1 10E3/UL (ref 4–11)
WBC # BLD AUTO: 15.3 10E3/UL (ref 4–11)
WBC # BLD AUTO: 17.8 10E3/UL (ref 4–11)
WBC # BLD AUTO: 6.9 10E3/UL (ref 4–11)
WBC # BLD AUTO: 8 10E3/UL (ref 4–11)
WBC # BLD AUTO: 8 10E3/UL (ref 4–11)
WBC # BLD AUTO: 8.8 10E3/UL (ref 4–11)
WBC # BLD AUTO: 9 10E3/UL (ref 4–11)
WBC # BLD AUTO: 9.1 10E3/UL (ref 4–11)
WBC # BLD AUTO: 9.4 10E3/UL (ref 4–11)
WBC CLUMPS #/AREA URNS HPF: PRESENT /HPF
WBC URINE: 169 /HPF
WBC URINE: 180 /HPF
WBC URINE: 3 /HPF
WBC URINE: <1 /HPF
WBC URINE: >182 /HPF

## 2023-01-01 PROCEDURE — 250N000013 HC RX MED GY IP 250 OP 250 PS 637: Performed by: SURGERY

## 2023-01-01 PROCEDURE — 80048 BASIC METABOLIC PNL TOTAL CA: CPT | Performed by: PHYSICIAN ASSISTANT

## 2023-01-01 PROCEDURE — 272N000452 HC KIT SHRLOCK 5FR POWER PICC TRIPLE LUMEN

## 2023-01-01 PROCEDURE — 99222 1ST HOSP IP/OBS MODERATE 55: CPT | Performed by: INTERNAL MEDICINE

## 2023-01-01 PROCEDURE — 250N000013 HC RX MED GY IP 250 OP 250 PS 637: Performed by: INTERNAL MEDICINE

## 2023-01-01 PROCEDURE — 84100 ASSAY OF PHOSPHORUS: CPT | Performed by: HOSPITALIST

## 2023-01-01 PROCEDURE — 36415 COLL VENOUS BLD VENIPUNCTURE: CPT | Performed by: EMERGENCY MEDICINE

## 2023-01-01 PROCEDURE — 999N000040 HC STATISTIC CONSULT NO CHARGE VASC ACCESS

## 2023-01-01 PROCEDURE — 250N000011 HC RX IP 250 OP 636: Performed by: REGISTERED NURSE

## 2023-01-01 PROCEDURE — 258N000003 HC RX IP 258 OP 636: Performed by: INTERNAL MEDICINE

## 2023-01-01 PROCEDURE — 36415 COLL VENOUS BLD VENIPUNCTURE: CPT | Performed by: STUDENT IN AN ORGANIZED HEALTH CARE EDUCATION/TRAINING PROGRAM

## 2023-01-01 PROCEDURE — 36415 COLL VENOUS BLD VENIPUNCTURE: CPT | Performed by: ANESTHESIOLOGY

## 2023-01-01 PROCEDURE — 70450 CT HEAD/BRAIN W/O DYE: CPT

## 2023-01-01 PROCEDURE — 999N000065 XR CHEST PORT 1 VIEW

## 2023-01-01 PROCEDURE — 250N000009 HC RX 250: Performed by: INTERNAL MEDICINE

## 2023-01-01 PROCEDURE — 87086 URINE CULTURE/COLONY COUNT: CPT | Performed by: EMERGENCY MEDICINE

## 2023-01-01 PROCEDURE — 370N000003 HC ANESTHESIA WARD SERVICE: Performed by: NURSE ANESTHETIST, CERTIFIED REGISTERED

## 2023-01-01 PROCEDURE — 250N000011 HC RX IP 250 OP 636: Mod: JZ | Performed by: SURGERY

## 2023-01-01 PROCEDURE — 250N000013 HC RX MED GY IP 250 OP 250 PS 637: Performed by: PHYSICIAN ASSISTANT

## 2023-01-01 PROCEDURE — 80048 BASIC METABOLIC PNL TOTAL CA: CPT | Performed by: STUDENT IN AN ORGANIZED HEALTH CARE EDUCATION/TRAINING PROGRAM

## 2023-01-01 PROCEDURE — 999N000259 HC STATISTIC EXTUBATION

## 2023-01-01 PROCEDURE — 87186 SC STD MICRODIL/AGAR DIL: CPT | Mod: ORL

## 2023-01-01 PROCEDURE — 99207 PR APP CREDIT; MD BILLING SHARED VISIT: CPT

## 2023-01-01 PROCEDURE — 81001 URINALYSIS AUTO W/SCOPE: CPT | Mod: ORL

## 2023-01-01 PROCEDURE — 250N000013 HC RX MED GY IP 250 OP 250 PS 637: Performed by: HOSPITALIST

## 2023-01-01 PROCEDURE — 80048 BASIC METABOLIC PNL TOTAL CA: CPT | Performed by: EMERGENCY MEDICINE

## 2023-01-01 PROCEDURE — 87086 URINE CULTURE/COLONY COUNT: CPT | Mod: ORL | Performed by: FAMILY MEDICINE

## 2023-01-01 PROCEDURE — 85018 HEMOGLOBIN: CPT | Performed by: NURSE PRACTITIONER

## 2023-01-01 PROCEDURE — P9603 ONE-WAY ALLOW PRORATED MILES: HCPCS | Mod: ORL | Performed by: FAMILY MEDICINE

## 2023-01-01 PROCEDURE — 83880 ASSAY OF NATRIURETIC PEPTIDE: CPT | Performed by: STUDENT IN AN ORGANIZED HEALTH CARE EDUCATION/TRAINING PROGRAM

## 2023-01-01 PROCEDURE — 255N000002 HC RX 255 OP 636: Performed by: INTERNAL MEDICINE

## 2023-01-01 PROCEDURE — 258N000003 HC RX IP 258 OP 636: Performed by: EMERGENCY MEDICINE

## 2023-01-01 PROCEDURE — 83735 ASSAY OF MAGNESIUM: CPT | Performed by: SURGERY

## 2023-01-01 PROCEDURE — C1769 GUIDE WIRE: HCPCS | Performed by: INTERNAL MEDICINE

## 2023-01-01 PROCEDURE — 84100 ASSAY OF PHOSPHORUS: CPT | Performed by: SURGERY

## 2023-01-01 PROCEDURE — 250N000009 HC RX 250: Performed by: EMERGENCY MEDICINE

## 2023-01-01 PROCEDURE — 200N000001 HC R&B ICU

## 2023-01-01 PROCEDURE — 250N000011 HC RX IP 250 OP 636: Performed by: PEDIATRICS

## 2023-01-01 PROCEDURE — 82805 BLOOD GASES W/O2 SATURATION: CPT

## 2023-01-01 PROCEDURE — 96375 TX/PRO/DX INJ NEW DRUG ADDON: CPT

## 2023-01-01 PROCEDURE — 343N000001 HC RX 343: Performed by: INTERNAL MEDICINE

## 2023-01-01 PROCEDURE — 85025 COMPLETE CBC W/AUTO DIFF WBC: CPT | Performed by: FAMILY MEDICINE

## 2023-01-01 PROCEDURE — 36415 COLL VENOUS BLD VENIPUNCTURE: CPT | Performed by: FAMILY MEDICINE

## 2023-01-01 PROCEDURE — 80053 COMPREHEN METABOLIC PANEL: CPT | Performed by: EMERGENCY MEDICINE

## 2023-01-01 PROCEDURE — 258N000001 HC RX 258: Performed by: SURGERY

## 2023-01-01 PROCEDURE — 250N000011 HC RX IP 250 OP 636: Mod: JZ | Performed by: HOSPITALIST

## 2023-01-01 PROCEDURE — 250N000011 HC RX IP 250 OP 636: Mod: JZ

## 2023-01-01 PROCEDURE — 80053 COMPREHEN METABOLIC PANEL: CPT | Mod: ORL | Performed by: FAMILY MEDICINE

## 2023-01-01 PROCEDURE — 93308 TTE F-UP OR LMTD: CPT | Mod: 26 | Performed by: INTERNAL MEDICINE

## 2023-01-01 PROCEDURE — 999N000157 HC STATISTIC RCP TIME EA 10 MIN

## 2023-01-01 PROCEDURE — 83605 ASSAY OF LACTIC ACID: CPT | Performed by: SURGERY

## 2023-01-01 PROCEDURE — C8924 2D TTE W OR W/O FOL W/CON,FU: HCPCS

## 2023-01-01 PROCEDURE — 210N000002 HC R&B HEART CARE

## 2023-01-01 PROCEDURE — 84484 ASSAY OF TROPONIN QUANT: CPT | Performed by: HOSPITALIST

## 2023-01-01 PROCEDURE — 36415 COLL VENOUS BLD VENIPUNCTURE: CPT | Performed by: NURSE PRACTITIONER

## 2023-01-01 PROCEDURE — 36569 INSJ PICC 5 YR+ W/O IMAGING: CPT

## 2023-01-01 PROCEDURE — 81001 URINALYSIS AUTO W/SCOPE: CPT | Mod: ORL | Performed by: FAMILY MEDICINE

## 2023-01-01 PROCEDURE — 250N000011 HC RX IP 250 OP 636: Mod: JZ | Performed by: STUDENT IN AN ORGANIZED HEALTH CARE EDUCATION/TRAINING PROGRAM

## 2023-01-01 PROCEDURE — 82330 ASSAY OF CALCIUM: CPT | Performed by: STUDENT IN AN ORGANIZED HEALTH CARE EDUCATION/TRAINING PROGRAM

## 2023-01-01 PROCEDURE — 99232 SBSQ HOSP IP/OBS MODERATE 35: CPT | Performed by: INTERNAL MEDICINE

## 2023-01-01 PROCEDURE — 84132 ASSAY OF SERUM POTASSIUM: CPT | Performed by: INTERNAL MEDICINE

## 2023-01-01 PROCEDURE — 80048 BASIC METABOLIC PNL TOTAL CA: CPT | Performed by: SURGERY

## 2023-01-01 PROCEDURE — C1894 INTRO/SHEATH, NON-LASER: HCPCS | Performed by: INTERNAL MEDICINE

## 2023-01-01 PROCEDURE — 5A1935Z RESPIRATORY VENTILATION, LESS THAN 24 CONSECUTIVE HOURS: ICD-10-PCS | Performed by: INTERNAL MEDICINE

## 2023-01-01 PROCEDURE — 99233 SBSQ HOSP IP/OBS HIGH 50: CPT | Mod: 25 | Performed by: PHYSICIAN ASSISTANT

## 2023-01-01 PROCEDURE — 81001 URINALYSIS AUTO W/SCOPE: CPT | Performed by: EMERGENCY MEDICINE

## 2023-01-01 PROCEDURE — 36415 COLL VENOUS BLD VENIPUNCTURE: CPT | Mod: ORL | Performed by: FAMILY MEDICINE

## 2023-01-01 PROCEDURE — 82746 ASSAY OF FOLIC ACID SERUM: CPT | Performed by: HOSPITALIST

## 2023-01-01 PROCEDURE — 250N000013 HC RX MED GY IP 250 OP 250 PS 637: Performed by: EMERGENCY MEDICINE

## 2023-01-01 PROCEDURE — 93456 R HRT CORONARY ARTERY ANGIO: CPT | Mod: 26 | Performed by: INTERNAL MEDICINE

## 2023-01-01 PROCEDURE — 83550 IRON BINDING TEST: CPT | Performed by: HOSPITALIST

## 2023-01-01 PROCEDURE — G0378 HOSPITAL OBSERVATION PER HR: HCPCS

## 2023-01-01 PROCEDURE — 99233 SBSQ HOSP IP/OBS HIGH 50: CPT | Performed by: INTERNAL MEDICINE

## 2023-01-01 PROCEDURE — 84443 ASSAY THYROID STIM HORMONE: CPT | Mod: ORL | Performed by: FAMILY MEDICINE

## 2023-01-01 PROCEDURE — 93456 R HRT CORONARY ARTERY ANGIO: CPT | Performed by: INTERNAL MEDICINE

## 2023-01-01 PROCEDURE — 250N000013 HC RX MED GY IP 250 OP 250 PS 637: Performed by: STUDENT IN AN ORGANIZED HEALTH CARE EDUCATION/TRAINING PROGRAM

## 2023-01-01 PROCEDURE — 36415 COLL VENOUS BLD VENIPUNCTURE: CPT | Performed by: HOSPITALIST

## 2023-01-01 PROCEDURE — 82607 VITAMIN B-12: CPT | Performed by: HOSPITALIST

## 2023-01-01 PROCEDURE — 82803 BLOOD GASES ANY COMBINATION: CPT | Performed by: SURGERY

## 2023-01-01 PROCEDURE — 96360 HYDRATION IV INFUSION INIT: CPT

## 2023-01-01 PROCEDURE — 250N000011 HC RX IP 250 OP 636: Performed by: EMERGENCY MEDICINE

## 2023-01-01 PROCEDURE — 80053 COMPREHEN METABOLIC PANEL: CPT | Performed by: INTERNAL MEDICINE

## 2023-01-01 PROCEDURE — 84145 PROCALCITONIN (PCT): CPT | Performed by: HOSPITALIST

## 2023-01-01 PROCEDURE — 85027 COMPLETE CBC AUTOMATED: CPT | Performed by: HOSPITALIST

## 2023-01-01 PROCEDURE — 99233 SBSQ HOSP IP/OBS HIGH 50: CPT | Performed by: STUDENT IN AN ORGANIZED HEALTH CARE EDUCATION/TRAINING PROGRAM

## 2023-01-01 PROCEDURE — 93005 ELECTROCARDIOGRAM TRACING: CPT

## 2023-01-01 PROCEDURE — 71045 X-RAY EXAM CHEST 1 VIEW: CPT

## 2023-01-01 PROCEDURE — 84132 ASSAY OF SERUM POTASSIUM: CPT | Performed by: SURGERY

## 2023-01-01 PROCEDURE — 999N000253 HC STATISTIC WEANING TRIALS

## 2023-01-01 PROCEDURE — P9604 ONE-WAY ALLOW PRORATED TRIP: HCPCS | Performed by: FAMILY MEDICINE

## 2023-01-01 PROCEDURE — 120N000001 HC R&B MED SURG/OB

## 2023-01-01 PROCEDURE — 97116 GAIT TRAINING THERAPY: CPT | Mod: GP | Performed by: PHYSICAL THERAPIST

## 2023-01-01 PROCEDURE — 36415 COLL VENOUS BLD VENIPUNCTURE: CPT | Performed by: INTERNAL MEDICINE

## 2023-01-01 PROCEDURE — 250N000011 HC RX IP 250 OP 636: Performed by: INTERNAL MEDICINE

## 2023-01-01 PROCEDURE — 87088 URINE BACTERIA CULTURE: CPT | Mod: ORL | Performed by: FAMILY MEDICINE

## 2023-01-01 PROCEDURE — 99291 CRITICAL CARE FIRST HOUR: CPT | Mod: GC | Performed by: STUDENT IN AN ORGANIZED HEALTH CARE EDUCATION/TRAINING PROGRAM

## 2023-01-01 PROCEDURE — 85610 PROTHROMBIN TIME: CPT | Performed by: NURSE PRACTITIONER

## 2023-01-01 PROCEDURE — 93010 ELECTROCARDIOGRAM REPORT: CPT | Performed by: INTERNAL MEDICINE

## 2023-01-01 PROCEDURE — 85027 COMPLETE CBC AUTOMATED: CPT | Performed by: STUDENT IN AN ORGANIZED HEALTH CARE EDUCATION/TRAINING PROGRAM

## 2023-01-01 PROCEDURE — 83605 ASSAY OF LACTIC ACID: CPT | Performed by: EMERGENCY MEDICINE

## 2023-01-01 PROCEDURE — P9604 ONE-WAY ALLOW PRORATED TRIP: HCPCS

## 2023-01-01 PROCEDURE — C9113 INJ PANTOPRAZOLE SODIUM, VIA: HCPCS | Mod: JZ | Performed by: INTERNAL MEDICINE

## 2023-01-01 PROCEDURE — 99232 SBSQ HOSP IP/OBS MODERATE 35: CPT | Performed by: HOSPITALIST

## 2023-01-01 PROCEDURE — 51702 INSERT TEMP BLADDER CATH: CPT

## 2023-01-01 PROCEDURE — 80048 BASIC METABOLIC PNL TOTAL CA: CPT | Performed by: HOSPITALIST

## 2023-01-01 PROCEDURE — 83605 ASSAY OF LACTIC ACID: CPT | Performed by: HOSPITALIST

## 2023-01-01 PROCEDURE — 71046 X-RAY EXAM CHEST 2 VIEWS: CPT

## 2023-01-01 PROCEDURE — 94003 VENT MGMT INPAT SUBQ DAY: CPT

## 2023-01-01 PROCEDURE — 999N000185 HC STATISTIC TRANSPORT TIME EA 15 MIN

## 2023-01-01 PROCEDURE — 85014 HEMATOCRIT: CPT | Performed by: NURSE PRACTITIONER

## 2023-01-01 PROCEDURE — 94002 VENT MGMT INPAT INIT DAY: CPT

## 2023-01-01 PROCEDURE — 71275 CT ANGIOGRAPHY CHEST: CPT

## 2023-01-01 PROCEDURE — 97530 THERAPEUTIC ACTIVITIES: CPT | Mod: GP | Performed by: PHYSICAL THERAPIST

## 2023-01-01 PROCEDURE — 97116 GAIT TRAINING THERAPY: CPT | Mod: GP

## 2023-01-01 PROCEDURE — B2111ZZ FLUOROSCOPY OF MULTIPLE CORONARY ARTERIES USING LOW OSMOLAR CONTRAST: ICD-10-PCS | Performed by: INTERNAL MEDICINE

## 2023-01-01 PROCEDURE — 36415 COLL VENOUS BLD VENIPUNCTURE: CPT

## 2023-01-01 PROCEDURE — 87040 BLOOD CULTURE FOR BACTERIA: CPT | Performed by: EMERGENCY MEDICINE

## 2023-01-01 PROCEDURE — 85025 COMPLETE CBC W/AUTO DIFF WBC: CPT | Performed by: EMERGENCY MEDICINE

## 2023-01-01 PROCEDURE — 99291 CRITICAL CARE FIRST HOUR: CPT

## 2023-01-01 PROCEDURE — 85018 HEMOGLOBIN: CPT

## 2023-01-01 PROCEDURE — 85025 COMPLETE CBC W/AUTO DIFF WBC: CPT | Mod: ORL | Performed by: FAMILY MEDICINE

## 2023-01-01 PROCEDURE — 99291 CRITICAL CARE FIRST HOUR: CPT | Performed by: INTERNAL MEDICINE

## 2023-01-01 PROCEDURE — 82330 ASSAY OF CALCIUM: CPT | Performed by: SURGERY

## 2023-01-01 PROCEDURE — 99203 OFFICE O/P NEW LOW 30 MIN: CPT | Performed by: NURSE PRACTITIONER

## 2023-01-01 PROCEDURE — 250N000011 HC RX IP 250 OP 636: Mod: JZ | Performed by: INTERNAL MEDICINE

## 2023-01-01 PROCEDURE — 85610 PROTHROMBIN TIME: CPT

## 2023-01-01 PROCEDURE — C1751 CATH, INF, PER/CENT/MIDLINE: HCPCS | Performed by: INTERNAL MEDICINE

## 2023-01-01 PROCEDURE — 80069 RENAL FUNCTION PANEL: CPT | Mod: ORL | Performed by: FAMILY MEDICINE

## 2023-01-01 PROCEDURE — 83036 HEMOGLOBIN GLYCOSYLATED A1C: CPT | Performed by: SURGERY

## 2023-01-01 PROCEDURE — 250N000011 HC RX IP 250 OP 636: Performed by: HOSPITALIST

## 2023-01-01 PROCEDURE — 82040 ASSAY OF SERUM ALBUMIN: CPT

## 2023-01-01 PROCEDURE — 99285 EMERGENCY DEPT VISIT HI MDM: CPT | Mod: 25

## 2023-01-01 PROCEDURE — 83605 ASSAY OF LACTIC ACID: CPT

## 2023-01-01 PROCEDURE — 93325 DOPPLER ECHO COLOR FLOW MAPG: CPT

## 2023-01-01 PROCEDURE — 82803 BLOOD GASES ANY COMBINATION: CPT

## 2023-01-01 PROCEDURE — 80179 DRUG ASSAY SALICYLATE: CPT | Performed by: EMERGENCY MEDICINE

## 2023-01-01 PROCEDURE — 250N000011 HC RX IP 250 OP 636: Mod: JZ | Performed by: ANESTHESIOLOGY

## 2023-01-01 PROCEDURE — 36415 COLL VENOUS BLD VENIPUNCTURE: CPT | Performed by: SURGERY

## 2023-01-01 PROCEDURE — 272N000001 HC OR GENERAL SUPPLY STERILE: Performed by: INTERNAL MEDICINE

## 2023-01-01 PROCEDURE — 99223 1ST HOSP IP/OBS HIGH 75: CPT | Performed by: HOSPITALIST

## 2023-01-01 PROCEDURE — 84100 ASSAY OF PHOSPHORUS: CPT | Performed by: INTERNAL MEDICINE

## 2023-01-01 PROCEDURE — 84484 ASSAY OF TROPONIN QUANT: CPT | Performed by: EMERGENCY MEDICINE

## 2023-01-01 PROCEDURE — 85027 COMPLETE CBC AUTOMATED: CPT | Mod: ORL | Performed by: FAMILY MEDICINE

## 2023-01-01 PROCEDURE — C9113 INJ PANTOPRAZOLE SODIUM, VIA: HCPCS | Mod: JZ | Performed by: SURGERY

## 2023-01-01 PROCEDURE — 250N000011 HC RX IP 250 OP 636: Mod: JZ | Performed by: NURSE PRACTITIONER

## 2023-01-01 PROCEDURE — 84132 ASSAY OF SERUM POTASSIUM: CPT | Performed by: ANESTHESIOLOGY

## 2023-01-01 PROCEDURE — A9502 TC99M TETROFOSMIN: HCPCS | Performed by: INTERNAL MEDICINE

## 2023-01-01 PROCEDURE — 83735 ASSAY OF MAGNESIUM: CPT | Performed by: STUDENT IN AN ORGANIZED HEALTH CARE EDUCATION/TRAINING PROGRAM

## 2023-01-01 PROCEDURE — 99291 CRITICAL CARE FIRST HOUR: CPT | Mod: 25 | Performed by: INTERNAL MEDICINE

## 2023-01-01 PROCEDURE — 80048 BASIC METABOLIC PNL TOTAL CA: CPT | Performed by: FAMILY MEDICINE

## 2023-01-01 PROCEDURE — 999N000009 HC STATISTIC AIRWAY CARE

## 2023-01-01 PROCEDURE — 93325 DOPPLER ECHO COLOR FLOW MAPG: CPT | Mod: 26 | Performed by: INTERNAL MEDICINE

## 2023-01-01 PROCEDURE — 94660 CPAP INITIATION&MGMT: CPT

## 2023-01-01 PROCEDURE — 83880 ASSAY OF NATRIURETIC PEPTIDE: CPT

## 2023-01-01 PROCEDURE — 99239 HOSP IP/OBS DSCHRG MGMT >30: CPT | Performed by: INTERNAL MEDICINE

## 2023-01-01 PROCEDURE — 258N000003 HC RX IP 258 OP 636: Performed by: SURGERY

## 2023-01-01 PROCEDURE — 250N000011 HC RX IP 250 OP 636: Mod: JZ | Performed by: EMERGENCY MEDICINE

## 2023-01-01 PROCEDURE — 36600 WITHDRAWAL OF ARTERIAL BLOOD: CPT

## 2023-01-01 PROCEDURE — 99233 SBSQ HOSP IP/OBS HIGH 50: CPT | Mod: 25 | Performed by: INTERNAL MEDICINE

## 2023-01-01 PROCEDURE — 80048 BASIC METABOLIC PNL TOTAL CA: CPT | Performed by: INTERNAL MEDICINE

## 2023-01-01 PROCEDURE — 99152 MOD SED SAME PHYS/QHP 5/>YRS: CPT | Mod: GC | Performed by: INTERNAL MEDICINE

## 2023-01-01 PROCEDURE — 82805 BLOOD GASES W/O2 SATURATION: CPT | Performed by: EMERGENCY MEDICINE

## 2023-01-01 PROCEDURE — 99231 SBSQ HOSP IP/OBS SF/LOW 25: CPT | Performed by: HOSPITALIST

## 2023-01-01 PROCEDURE — 83735 ASSAY OF MAGNESIUM: CPT | Performed by: ANESTHESIOLOGY

## 2023-01-01 PROCEDURE — 97530 THERAPEUTIC ACTIVITIES: CPT | Mod: GP

## 2023-01-01 PROCEDURE — 99153 MOD SED SAME PHYS/QHP EA: CPT | Performed by: INTERNAL MEDICINE

## 2023-01-01 PROCEDURE — 85014 HEMATOCRIT: CPT | Performed by: INTERNAL MEDICINE

## 2023-01-01 PROCEDURE — 82803 BLOOD GASES ANY COMBINATION: CPT | Performed by: INTERNAL MEDICINE

## 2023-01-01 PROCEDURE — 258N000003 HC RX IP 258 OP 636: Performed by: PEDIATRICS

## 2023-01-01 PROCEDURE — 99232 SBSQ HOSP IP/OBS MODERATE 35: CPT | Mod: 25 | Performed by: HOSPITALIST

## 2023-01-01 PROCEDURE — 82310 ASSAY OF CALCIUM: CPT

## 2023-01-01 PROCEDURE — 97535 SELF CARE MNGMENT TRAINING: CPT | Mod: GO

## 2023-01-01 PROCEDURE — 84295 ASSAY OF SERUM SODIUM: CPT | Mod: ORL | Performed by: FAMILY MEDICINE

## 2023-01-01 PROCEDURE — 250N000013 HC RX MED GY IP 250 OP 250 PS 637: Performed by: REGISTERED NURSE

## 2023-01-01 PROCEDURE — 5A1935Z RESPIRATORY VENTILATION, LESS THAN 24 CONSECUTIVE HOURS: ICD-10-PCS

## 2023-01-01 PROCEDURE — 82310 ASSAY OF CALCIUM: CPT | Performed by: FAMILY MEDICINE

## 2023-01-01 PROCEDURE — 99152 MOD SED SAME PHYS/QHP 5/>YRS: CPT | Performed by: INTERNAL MEDICINE

## 2023-01-01 PROCEDURE — 82272 OCCULT BLD FECES 1-3 TESTS: CPT | Performed by: HOSPITALIST

## 2023-01-01 PROCEDURE — 93321 DOPPLER ECHO F-UP/LMTD STD: CPT | Mod: 26 | Performed by: INTERNAL MEDICINE

## 2023-01-01 PROCEDURE — 97161 PT EVAL LOW COMPLEX 20 MIN: CPT | Mod: GP

## 2023-01-01 PROCEDURE — 97161 PT EVAL LOW COMPLEX 20 MIN: CPT | Mod: GP | Performed by: PHYSICAL THERAPIST

## 2023-01-01 PROCEDURE — 93016 CV STRESS TEST SUPVJ ONLY: CPT | Performed by: INTERNAL MEDICINE

## 2023-01-01 PROCEDURE — 85049 AUTOMATED PLATELET COUNT: CPT | Performed by: SURGERY

## 2023-01-01 PROCEDURE — 85730 THROMBOPLASTIN TIME PARTIAL: CPT

## 2023-01-01 PROCEDURE — 84443 ASSAY THYROID STIM HORMONE: CPT | Performed by: EMERGENCY MEDICINE

## 2023-01-01 PROCEDURE — 82728 ASSAY OF FERRITIN: CPT | Performed by: HOSPITALIST

## 2023-01-01 PROCEDURE — 36415 COLL VENOUS BLD VENIPUNCTURE: CPT | Performed by: PHYSICIAN ASSISTANT

## 2023-01-01 PROCEDURE — 83880 ASSAY OF NATRIURETIC PEPTIDE: CPT | Performed by: SURGERY

## 2023-01-01 PROCEDURE — 84100 ASSAY OF PHOSPHORUS: CPT | Performed by: ANESTHESIOLOGY

## 2023-01-01 PROCEDURE — 82310 ASSAY OF CALCIUM: CPT | Performed by: HOSPITALIST

## 2023-01-01 PROCEDURE — 82310 ASSAY OF CALCIUM: CPT | Performed by: STUDENT IN AN ORGANIZED HEALTH CARE EDUCATION/TRAINING PROGRAM

## 2023-01-01 PROCEDURE — 250N000011 HC RX IP 250 OP 636: Performed by: SURGERY

## 2023-01-01 PROCEDURE — 99291 CRITICAL CARE FIRST HOUR: CPT | Mod: 25

## 2023-01-01 PROCEDURE — 250N000013 HC RX MED GY IP 250 OP 250 PS 637: Performed by: ANESTHESIOLOGY

## 2023-01-01 PROCEDURE — 78452 HT MUSCLE IMAGE SPECT MULT: CPT | Mod: 26 | Performed by: INTERNAL MEDICINE

## 2023-01-01 PROCEDURE — 250N000011 HC RX IP 250 OP 636: Performed by: STUDENT IN AN ORGANIZED HEALTH CARE EDUCATION/TRAINING PROGRAM

## 2023-01-01 PROCEDURE — 93018 CV STRESS TEST I&R ONLY: CPT | Performed by: INTERNAL MEDICINE

## 2023-01-01 PROCEDURE — 93017 CV STRESS TEST TRACING ONLY: CPT

## 2023-01-01 PROCEDURE — 250N000013 HC RX MED GY IP 250 OP 250 PS 637: Performed by: DERMATOLOGY

## 2023-01-01 PROCEDURE — 82077 ASSAY SPEC XCP UR&BREATH IA: CPT | Performed by: EMERGENCY MEDICINE

## 2023-01-01 PROCEDURE — 80143 DRUG ASSAY ACETAMINOPHEN: CPT | Performed by: EMERGENCY MEDICINE

## 2023-01-01 PROCEDURE — 97165 OT EVAL LOW COMPLEX 30 MIN: CPT | Mod: GO

## 2023-01-01 PROCEDURE — 80307 DRUG TEST PRSMV CHEM ANLYZR: CPT | Performed by: EMERGENCY MEDICINE

## 2023-01-01 PROCEDURE — 85018 HEMOGLOBIN: CPT | Performed by: PHYSICIAN ASSISTANT

## 2023-01-01 PROCEDURE — 258N000003 HC RX IP 258 OP 636: Performed by: HOSPITALIST

## 2023-01-01 PROCEDURE — 99283 EMERGENCY DEPT VISIT LOW MDM: CPT

## 2023-01-01 PROCEDURE — 87637 SARSCOV2&INF A&B&RSV AMP PRB: CPT | Performed by: EMERGENCY MEDICINE

## 2023-01-01 PROCEDURE — 84484 ASSAY OF TROPONIN QUANT: CPT

## 2023-01-01 PROCEDURE — 85379 FIBRIN DEGRADATION QUANT: CPT | Performed by: EMERGENCY MEDICINE

## 2023-01-01 PROCEDURE — 85014 HEMATOCRIT: CPT | Performed by: SURGERY

## 2023-01-01 PROCEDURE — 85018 HEMOGLOBIN: CPT | Performed by: HOSPITALIST

## 2023-01-01 PROCEDURE — 71260 CT THORAX DX C+: CPT

## 2023-01-01 PROCEDURE — 83605 ASSAY OF LACTIC ACID: CPT | Performed by: INTERNAL MEDICINE

## 2023-01-01 PROCEDURE — 96374 THER/PROPH/DIAG INJ IV PUSH: CPT

## 2023-01-01 PROCEDURE — 84484 ASSAY OF TROPONIN QUANT: CPT | Performed by: PHYSICIAN ASSISTANT

## 2023-01-01 PROCEDURE — 4A023N6 MEASUREMENT OF CARDIAC SAMPLING AND PRESSURE, RIGHT HEART, PERCUTANEOUS APPROACH: ICD-10-PCS | Performed by: INTERNAL MEDICINE

## 2023-01-01 PROCEDURE — 999N000065 XR ABDOMEN PORT 1 VIEW

## 2023-01-01 PROCEDURE — 99223 1ST HOSP IP/OBS HIGH 75: CPT | Performed by: REGISTERED NURSE

## 2023-01-01 PROCEDURE — 78452 HT MUSCLE IMAGE SPECT MULT: CPT

## 2023-01-01 PROCEDURE — 999N000049 HC STATISTIC ECHO STRESS OR NM NPI

## 2023-01-01 PROCEDURE — P9603 ONE-WAY ALLOW PRORATED MILES: HCPCS

## 2023-01-01 PROCEDURE — 85025 COMPLETE CBC W/AUTO DIFF WBC: CPT | Performed by: HOSPITALIST

## 2023-01-01 PROCEDURE — 96365 THER/PROPH/DIAG IV INF INIT: CPT | Mod: 59

## 2023-01-01 PROCEDURE — 87186 SC STD MICRODIL/AGAR DIL: CPT | Performed by: EMERGENCY MEDICINE

## 2023-01-01 PROCEDURE — 82010 KETONE BODYS QUAN: CPT | Performed by: SURGERY

## 2023-01-01 PROCEDURE — 99239 HOSP IP/OBS DSCHRG MGMT >30: CPT | Mod: GW | Performed by: INTERNAL MEDICINE

## 2023-01-01 PROCEDURE — 93306 TTE W/DOPPLER COMPLETE: CPT | Mod: 26 | Performed by: INTERNAL MEDICINE

## 2023-01-01 PROCEDURE — 80048 BASIC METABOLIC PNL TOTAL CA: CPT

## 2023-01-01 PROCEDURE — 87641 MR-STAPH DNA AMP PROBE: CPT | Performed by: SURGERY

## 2023-01-01 PROCEDURE — 83735 ASSAY OF MAGNESIUM: CPT | Performed by: INTERNAL MEDICINE

## 2023-01-01 PROCEDURE — 83880 ASSAY OF NATRIURETIC PEPTIDE: CPT | Performed by: EMERGENCY MEDICINE

## 2023-01-01 PROCEDURE — 85027 COMPLETE CBC AUTOMATED: CPT | Performed by: SURGERY

## 2023-01-01 PROCEDURE — 93460 R&L HRT ART/VENTRICLE ANGIO: CPT | Performed by: INTERNAL MEDICINE

## 2023-01-01 RX ORDER — GABAPENTIN 100 MG/1
100 CAPSULE ORAL 2 TIMES DAILY
Status: DISCONTINUED | OUTPATIENT
Start: 2023-01-01 | End: 2023-01-01

## 2023-01-01 RX ORDER — PROPOFOL 10 MG/ML
5-75 INJECTION, EMULSION INTRAVENOUS CONTINUOUS
Status: DISCONTINUED | OUTPATIENT
Start: 2023-01-01 | End: 2023-01-01

## 2023-01-01 RX ORDER — POTASSIUM CHLORIDE 750 MG/1
10 TABLET, EXTENDED RELEASE ORAL ONCE
Status: COMPLETED | OUTPATIENT
Start: 2023-01-01 | End: 2023-01-01

## 2023-01-01 RX ORDER — FENTANYL CITRATE 50 UG/ML
50 INJECTION, SOLUTION INTRAMUSCULAR; INTRAVENOUS ONCE
Status: COMPLETED | OUTPATIENT
Start: 2023-01-01 | End: 2023-01-01

## 2023-01-01 RX ORDER — METOPROLOL TARTRATE 25 MG/1
25 TABLET, FILM COATED ORAL 2 TIMES DAILY
Status: DISCONTINUED | OUTPATIENT
Start: 2023-01-01 | End: 2023-01-01 | Stop reason: HOSPADM

## 2023-01-01 RX ORDER — ROSUVASTATIN CALCIUM 10 MG/1
10 TABLET, COATED ORAL EVERY EVENING
Status: ON HOLD | DISCHARGE
Start: 2023-01-01 | End: 2023-01-01

## 2023-01-01 RX ORDER — NITROGLYCERIN 20 MG/100ML
10-200 INJECTION INTRAVENOUS CONTINUOUS
Status: DISCONTINUED | OUTPATIENT
Start: 2023-01-01 | End: 2023-01-01

## 2023-01-01 RX ORDER — POTASSIUM CHLORIDE 1500 MG/1
20 TABLET, EXTENDED RELEASE ORAL
Status: DISCONTINUED | OUTPATIENT
Start: 2023-01-01 | End: 2023-01-01 | Stop reason: HOSPADM

## 2023-01-01 RX ORDER — ACETAMINOPHEN 500 MG
1000 TABLET ORAL 3 TIMES DAILY
Status: ON HOLD | COMMUNITY
End: 2023-01-01

## 2023-01-01 RX ORDER — NALOXONE HYDROCHLORIDE 0.4 MG/ML
0.4 INJECTION, SOLUTION INTRAMUSCULAR; INTRAVENOUS; SUBCUTANEOUS
Status: DISCONTINUED | OUTPATIENT
Start: 2023-01-01 | End: 2023-01-01

## 2023-01-01 RX ORDER — POTASSIUM CHLORIDE 1500 MG/1
20 TABLET, EXTENDED RELEASE ORAL ONCE
Status: COMPLETED | OUTPATIENT
Start: 2023-01-01 | End: 2023-01-01

## 2023-01-01 RX ORDER — GABAPENTIN 100 MG/1
100 CAPSULE ORAL 2 TIMES DAILY
Status: DISCONTINUED | OUTPATIENT
Start: 2023-01-01 | End: 2023-01-01 | Stop reason: HOSPADM

## 2023-01-01 RX ORDER — ROSUVASTATIN CALCIUM 5 MG/1
5 TABLET, COATED ORAL EVERY EVENING
Status: DISCONTINUED | OUTPATIENT
Start: 2023-01-01 | End: 2023-01-01

## 2023-01-01 RX ORDER — FUROSEMIDE 10 MG/ML
80 INJECTION INTRAMUSCULAR; INTRAVENOUS EVERY 12 HOURS
Status: DISCONTINUED | OUTPATIENT
Start: 2023-01-01 | End: 2023-01-01 | Stop reason: ALTCHOICE

## 2023-01-01 RX ORDER — SPIRONOLACTONE 25 MG/1
12.5 TABLET ORAL DAILY
Status: ON HOLD | COMMUNITY
End: 2023-01-01

## 2023-01-01 RX ORDER — ASPIRIN 81 MG/1
81 TABLET ORAL DAILY
Status: DISCONTINUED | OUTPATIENT
Start: 2023-01-01 | End: 2023-01-01

## 2023-01-01 RX ORDER — ACYCLOVIR 200 MG/1
10 CAPSULE ORAL ONCE
Status: COMPLETED | OUTPATIENT
Start: 2023-01-01 | End: 2023-01-01

## 2023-01-01 RX ORDER — PANTOPRAZOLE SODIUM 40 MG/1
40 TABLET, DELAYED RELEASE ORAL DAILY
Status: ON HOLD | COMMUNITY
End: 2023-01-01

## 2023-01-01 RX ORDER — FUROSEMIDE 10 MG/ML
40 INJECTION INTRAMUSCULAR; INTRAVENOUS EVERY 8 HOURS
Status: DISCONTINUED | OUTPATIENT
Start: 2023-01-01 | End: 2023-01-01

## 2023-01-01 RX ORDER — FUROSEMIDE 20 MG
20 TABLET ORAL DAILY
Status: DISCONTINUED | OUTPATIENT
Start: 2023-01-01 | End: 2023-01-01 | Stop reason: HOSPADM

## 2023-01-01 RX ORDER — ACETAMINOPHEN 650 MG/1
650 SUPPOSITORY RECTAL EVERY 4 HOURS PRN
Status: DISCONTINUED | OUTPATIENT
Start: 2023-01-01 | End: 2023-01-01 | Stop reason: HOSPADM

## 2023-01-01 RX ORDER — ASPIRIN 81 MG/1
81 TABLET ORAL DAILY
Status: ON HOLD | COMMUNITY
End: 2023-01-01

## 2023-01-01 RX ORDER — SODIUM CHLORIDE 9 MG/ML
75 INJECTION, SOLUTION INTRAVENOUS CONTINUOUS
Status: ACTIVE | OUTPATIENT
Start: 2023-01-01 | End: 2023-01-01

## 2023-01-01 RX ORDER — ATROPINE SULFATE 10 MG/ML
2 SOLUTION/ DROPS OPHTHALMIC
Status: DISCONTINUED | OUTPATIENT
Start: 2023-01-01 | End: 2023-01-01 | Stop reason: HOSPADM

## 2023-01-01 RX ORDER — FENTANYL CITRATE 50 UG/ML
INJECTION, SOLUTION INTRAMUSCULAR; INTRAVENOUS
Status: DISCONTINUED | OUTPATIENT
Start: 2023-01-01 | End: 2023-01-01 | Stop reason: HOSPADM

## 2023-01-01 RX ORDER — MAGNESIUM SULFATE HEPTAHYDRATE 40 MG/ML
2 INJECTION, SOLUTION INTRAVENOUS ONCE
Status: COMPLETED | OUTPATIENT
Start: 2023-01-01 | End: 2023-01-01

## 2023-01-01 RX ORDER — NALOXONE HYDROCHLORIDE 0.4 MG/ML
0.2 INJECTION, SOLUTION INTRAMUSCULAR; INTRAVENOUS; SUBCUTANEOUS
Status: DISCONTINUED | OUTPATIENT
Start: 2023-01-01 | End: 2023-01-01 | Stop reason: HOSPADM

## 2023-01-01 RX ORDER — MORPHINE SULFATE 20 MG/ML
5 SOLUTION ORAL
Status: DISCONTINUED | OUTPATIENT
Start: 2023-01-01 | End: 2023-01-01 | Stop reason: HOSPADM

## 2023-01-01 RX ORDER — LEFLUNOMIDE 20 MG/1
20 TABLET ORAL DAILY
Status: ON HOLD | COMMUNITY
End: 2023-01-01

## 2023-01-01 RX ORDER — FUROSEMIDE 40 MG
40 TABLET ORAL DAILY
Status: DISCONTINUED | OUTPATIENT
Start: 2023-01-01 | End: 2023-01-01

## 2023-01-01 RX ORDER — POTASSIUM CHLORIDE 20MEQ/15ML
10 LIQUID (ML) ORAL ONCE
Status: COMPLETED | OUTPATIENT
Start: 2023-01-01 | End: 2023-01-01

## 2023-01-01 RX ORDER — NALOXONE HYDROCHLORIDE 0.4 MG/ML
0.4 INJECTION, SOLUTION INTRAMUSCULAR; INTRAVENOUS; SUBCUTANEOUS
Status: DISCONTINUED | OUTPATIENT
Start: 2023-01-01 | End: 2023-01-01 | Stop reason: HOSPADM

## 2023-01-01 RX ORDER — LORAZEPAM 0.5 MG/1
0.5 TABLET ORAL
Status: DISCONTINUED | OUTPATIENT
Start: 2023-01-01 | End: 2023-01-01 | Stop reason: HOSPADM

## 2023-01-01 RX ORDER — MORPHINE SULFATE 20 MG/ML
10 SOLUTION ORAL
Status: DISCONTINUED | OUTPATIENT
Start: 2023-01-01 | End: 2023-01-01 | Stop reason: HOSPADM

## 2023-01-01 RX ORDER — CARBOXYMETHYLCELLULOSE SODIUM 5 MG/ML
1 SOLUTION/ DROPS OPHTHALMIC
Status: DISCONTINUED | OUTPATIENT
Start: 2023-01-01 | End: 2023-01-01 | Stop reason: HOSPADM

## 2023-01-01 RX ORDER — SODIUM CHLORIDE 9 MG/ML
INJECTION, SOLUTION INTRAVENOUS CONTINUOUS
Status: DISCONTINUED | OUTPATIENT
Start: 2023-01-01 | End: 2023-01-01 | Stop reason: HOSPADM

## 2023-01-01 RX ORDER — CHLORHEXIDINE GLUCONATE ORAL RINSE 1.2 MG/ML
15 SOLUTION DENTAL EVERY 12 HOURS
Status: DISCONTINUED | OUTPATIENT
Start: 2023-01-01 | End: 2023-01-01

## 2023-01-01 RX ORDER — HYDRALAZINE HYDROCHLORIDE 20 MG/ML
10 INJECTION INTRAMUSCULAR; INTRAVENOUS EVERY 6 HOURS PRN
Status: DISCONTINUED | OUTPATIENT
Start: 2023-01-01 | End: 2023-01-01 | Stop reason: HOSPADM

## 2023-01-01 RX ORDER — LOSARTAN POTASSIUM 50 MG/1
50 TABLET ORAL DAILY
Status: ON HOLD | DISCHARGE
Start: 2023-01-01 | End: 2023-01-01

## 2023-01-01 RX ORDER — PIPERACILLIN SODIUM, TAZOBACTAM SODIUM 3; .375 G/15ML; G/15ML
3.38 INJECTION, POWDER, LYOPHILIZED, FOR SOLUTION INTRAVENOUS ONCE
Status: COMPLETED | OUTPATIENT
Start: 2023-01-01 | End: 2023-01-01

## 2023-01-01 RX ORDER — ACETAMINOPHEN 325 MG/1
650 TABLET ORAL EVERY 6 HOURS PRN
Status: DISCONTINUED | OUTPATIENT
Start: 2023-01-01 | End: 2023-01-01 | Stop reason: HOSPADM

## 2023-01-01 RX ORDER — BISACODYL 5 MG
5 TABLET, DELAYED RELEASE (ENTERIC COATED) ORAL DAILY PRN
Status: DISCONTINUED | OUTPATIENT
Start: 2023-01-01 | End: 2023-01-01 | Stop reason: HOSPADM

## 2023-01-01 RX ORDER — ACETAMINOPHEN 500 MG
1000 TABLET ORAL ONCE
Status: COMPLETED | OUTPATIENT
Start: 2023-01-01 | End: 2023-01-01

## 2023-01-01 RX ORDER — ROSUVASTATIN CALCIUM 5 MG/1
10 TABLET, COATED ORAL EVERY EVENING
Status: DISCONTINUED | OUTPATIENT
Start: 2023-01-01 | End: 2023-01-01 | Stop reason: HOSPADM

## 2023-01-01 RX ORDER — CARVEDILOL 6.25 MG/1
6.25 TABLET ORAL ONCE
Status: COMPLETED | OUTPATIENT
Start: 2023-01-01 | End: 2023-01-01

## 2023-01-01 RX ORDER — FUROSEMIDE 20 MG
20 TABLET ORAL DAILY
Status: DISCONTINUED | OUTPATIENT
Start: 2023-01-01 | End: 2023-01-01

## 2023-01-01 RX ORDER — ONDANSETRON 2 MG/ML
4 INJECTION INTRAMUSCULAR; INTRAVENOUS EVERY 30 MIN PRN
Status: DISCONTINUED | OUTPATIENT
Start: 2023-01-01 | End: 2023-01-01 | Stop reason: HOSPADM

## 2023-01-01 RX ORDER — BISACODYL 10 MG
10 SUPPOSITORY, RECTAL RECTAL DAILY PRN
Qty: 2 SUPPOSITORY | Refills: 0 | Status: SHIPPED | OUTPATIENT
Start: 2023-01-01

## 2023-01-01 RX ORDER — ENOXAPARIN SODIUM 100 MG/ML
30 INJECTION SUBCUTANEOUS EVERY 24 HOURS
Status: DISCONTINUED | OUTPATIENT
Start: 2023-01-01 | End: 2023-01-01 | Stop reason: ALTCHOICE

## 2023-01-01 RX ORDER — ASPIRIN 325 MG
325 TABLET ORAL ONCE
Status: COMPLETED | OUTPATIENT
Start: 2023-01-01 | End: 2023-01-01

## 2023-01-01 RX ORDER — CARVEDILOL 12.5 MG/1
12.5 TABLET ORAL 2 TIMES DAILY WITH MEALS
Status: DISCONTINUED | OUTPATIENT
Start: 2023-01-01 | End: 2023-01-01

## 2023-01-01 RX ORDER — ROSUVASTATIN CALCIUM 10 MG/1
10 TABLET, COATED ORAL EVERY EVENING
Status: DISCONTINUED | OUTPATIENT
Start: 2023-01-01 | End: 2023-01-01 | Stop reason: ALTCHOICE

## 2023-01-01 RX ORDER — LOPERAMIDE HCL 2 MG
4 CAPSULE ORAL 4 TIMES DAILY PRN
Qty: 30 CAPSULE | Refills: 0 | Status: SHIPPED | OUTPATIENT
Start: 2023-01-01

## 2023-01-01 RX ORDER — ROSUVASTATIN CALCIUM 10 MG/1
10 TABLET, COATED ORAL EVERY EVENING
Status: DISCONTINUED | OUTPATIENT
Start: 2023-01-01 | End: 2023-01-01 | Stop reason: HOSPADM

## 2023-01-01 RX ORDER — ASPIRIN 81 MG/1
81 TABLET, CHEWABLE ORAL DAILY
Status: DISCONTINUED | OUTPATIENT
Start: 2023-01-01 | End: 2023-01-01

## 2023-01-01 RX ORDER — MORPHINE SULFATE 15 MG/1
7.5 TABLET ORAL EVERY 4 HOURS PRN
Qty: 12 TABLET | Refills: 0 | Status: SHIPPED | OUTPATIENT
Start: 2023-01-01

## 2023-01-01 RX ORDER — ONDANSETRON 4 MG/1
4 TABLET, ORALLY DISINTEGRATING ORAL EVERY 6 HOURS PRN
Status: DISCONTINUED | OUTPATIENT
Start: 2023-01-01 | End: 2023-01-01 | Stop reason: HOSPADM

## 2023-01-01 RX ORDER — PANTOPRAZOLE SODIUM 40 MG/1
40 TABLET, DELAYED RELEASE ORAL
Status: DISCONTINUED | OUTPATIENT
Start: 2023-01-01 | End: 2023-01-01 | Stop reason: HOSPADM

## 2023-01-01 RX ORDER — METOPROLOL SUCCINATE 50 MG/1
50 TABLET, EXTENDED RELEASE ORAL DAILY
Status: DISCONTINUED | OUTPATIENT
Start: 2023-01-01 | End: 2023-01-01

## 2023-01-01 RX ORDER — METOPROLOL TARTRATE 1 MG/ML
2.5 INJECTION, SOLUTION INTRAVENOUS ONCE
Status: COMPLETED | OUTPATIENT
Start: 2023-01-01 | End: 2023-01-01

## 2023-01-01 RX ORDER — LOSARTAN POTASSIUM 100 MG/1
100 TABLET ORAL DAILY
Status: ON HOLD | COMMUNITY
End: 2023-01-01

## 2023-01-01 RX ORDER — FENTANYL CITRATE 50 UG/ML
25-50 INJECTION, SOLUTION INTRAMUSCULAR; INTRAVENOUS
Status: DISCONTINUED | OUTPATIENT
Start: 2023-01-01 | End: 2023-01-01

## 2023-01-01 RX ORDER — CARVEDILOL 25 MG/1
25 TABLET ORAL 2 TIMES DAILY WITH MEALS
Status: DISCONTINUED | OUTPATIENT
Start: 2023-01-01 | End: 2023-01-01

## 2023-01-01 RX ORDER — FUROSEMIDE 40 MG
80 TABLET ORAL
Status: DISCONTINUED | OUTPATIENT
Start: 2023-01-01 | End: 2023-01-01

## 2023-01-01 RX ORDER — ASPIRIN 81 MG/1
243 TABLET, CHEWABLE ORAL ONCE
Status: COMPLETED | OUTPATIENT
Start: 2023-01-01 | End: 2023-01-01

## 2023-01-01 RX ORDER — CALCIUM GLUCONATE 20 MG/ML
2 INJECTION, SOLUTION INTRAVENOUS ONCE
Qty: 100 ML | Refills: 0 | Status: COMPLETED | OUTPATIENT
Start: 2023-01-01 | End: 2023-01-01

## 2023-01-01 RX ORDER — SULFAMETHOXAZOLE/TRIMETHOPRIM 800-160 MG
1 TABLET ORAL 2 TIMES DAILY
Status: COMPLETED | OUTPATIENT
Start: 2023-01-01 | End: 2023-01-01

## 2023-01-01 RX ORDER — LIDOCAINE 40 MG/G
CREAM TOPICAL
Status: DISCONTINUED | OUTPATIENT
Start: 2023-01-01 | End: 2023-01-01 | Stop reason: HOSPADM

## 2023-01-01 RX ORDER — LOPERAMIDE HCL 2 MG
4 CAPSULE ORAL
Status: ON HOLD | COMMUNITY
End: 2023-01-01

## 2023-01-01 RX ORDER — MAGNESIUM OXIDE 400 MG/1
400 TABLET ORAL EVERY 4 HOURS
Status: COMPLETED | OUTPATIENT
Start: 2023-01-01 | End: 2023-01-01

## 2023-01-01 RX ORDER — SODIUM CHLORIDE 9 MG/ML
INJECTION, SOLUTION INTRAVENOUS CONTINUOUS
Status: DISCONTINUED | OUTPATIENT
Start: 2023-01-01 | End: 2023-01-01

## 2023-01-01 RX ORDER — CARVEDILOL 6.25 MG/1
12.5 TABLET ORAL 2 TIMES DAILY WITH MEALS
Status: DISCONTINUED | OUTPATIENT
Start: 2023-01-01 | End: 2023-01-01

## 2023-01-01 RX ORDER — FUROSEMIDE 10 MG/ML
40 INJECTION INTRAMUSCULAR; INTRAVENOUS ONCE
Status: COMPLETED | OUTPATIENT
Start: 2023-01-01 | End: 2023-01-01

## 2023-01-01 RX ORDER — SENNOSIDES A AND B 8.6 MG/1
2 TABLET, FILM COATED ORAL 2 TIMES DAILY PRN
Qty: 30 TABLET | Refills: 0 | Status: SHIPPED | OUTPATIENT
Start: 2023-01-01

## 2023-01-01 RX ORDER — LIDOCAINE HYDROCHLORIDE 30 MG/G
CREAM TOPICAL
Status: ON HOLD | COMMUNITY
End: 2023-01-01

## 2023-01-01 RX ORDER — NALOXONE HYDROCHLORIDE 0.4 MG/ML
0.2 INJECTION, SOLUTION INTRAMUSCULAR; INTRAVENOUS; SUBCUTANEOUS
Status: DISCONTINUED | OUTPATIENT
Start: 2023-01-01 | End: 2023-01-01

## 2023-01-01 RX ORDER — LIDOCAINE 4 G/G
1 PATCH TOPICAL EVERY 24 HOURS
COMMUNITY
End: 2023-01-01

## 2023-01-01 RX ORDER — NICOTINE POLACRILEX 4 MG
15-30 LOZENGE BUCCAL
Status: DISCONTINUED | OUTPATIENT
Start: 2023-01-01 | End: 2023-01-01 | Stop reason: HOSPADM

## 2023-01-01 RX ORDER — LOSARTAN POTASSIUM 25 MG/1
25 TABLET ORAL DAILY
Status: DISCONTINUED | OUTPATIENT
Start: 2023-01-01 | End: 2023-01-01

## 2023-01-01 RX ORDER — ONDANSETRON 8 MG/1
8 TABLET, ORALLY DISINTEGRATING ORAL EVERY 8 HOURS PRN
Qty: 10 TABLET | Refills: 0 | Status: SHIPPED | OUTPATIENT
Start: 2023-01-01

## 2023-01-01 RX ORDER — NALOXONE HYDROCHLORIDE 0.4 MG/ML
0.1 INJECTION, SOLUTION INTRAMUSCULAR; INTRAVENOUS; SUBCUTANEOUS
Status: DISCONTINUED | OUTPATIENT
Start: 2023-01-01 | End: 2023-01-01 | Stop reason: HOSPADM

## 2023-01-01 RX ORDER — LOSARTAN POTASSIUM 50 MG/1
50 TABLET ORAL DAILY
Status: DISCONTINUED | OUTPATIENT
Start: 2023-01-01 | End: 2023-01-01 | Stop reason: HOSPADM

## 2023-01-01 RX ORDER — AMINOPHYLLINE 25 MG/ML
50-100 INJECTION, SOLUTION INTRAVENOUS
Status: COMPLETED | OUTPATIENT
Start: 2023-01-01 | End: 2023-01-01

## 2023-01-01 RX ORDER — MIDAZOLAM HCL IN 0.9 % NACL/PF 1 MG/ML
1-8 PLASTIC BAG, INJECTION (ML) INTRAVENOUS CONTINUOUS
Status: DISCONTINUED | OUTPATIENT
Start: 2023-01-01 | End: 2023-01-01

## 2023-01-01 RX ORDER — ALBUTEROL SULFATE 0.83 MG/ML
2.5 SOLUTION RESPIRATORY (INHALATION)
Status: DISCONTINUED | OUTPATIENT
Start: 2023-01-01 | End: 2023-01-01 | Stop reason: HOSPADM

## 2023-01-01 RX ORDER — LOPERAMIDE HCL 2 MG
2 CAPSULE ORAL 4 TIMES DAILY PRN
Status: DISCONTINUED | OUTPATIENT
Start: 2023-01-01 | End: 2023-01-01 | Stop reason: HOSPADM

## 2023-01-01 RX ORDER — FUROSEMIDE 40 MG
40 TABLET ORAL DAILY
Status: ON HOLD | DISCHARGE
Start: 2023-01-01 | End: 2023-01-01

## 2023-01-01 RX ORDER — HYDRALAZINE HYDROCHLORIDE 20 MG/ML
5-10 INJECTION INTRAMUSCULAR; INTRAVENOUS EVERY 6 HOURS PRN
Status: DISCONTINUED | OUTPATIENT
Start: 2023-01-01 | End: 2023-01-01

## 2023-01-01 RX ORDER — ALBUTEROL SULFATE 90 UG/1
2 AEROSOL, METERED RESPIRATORY (INHALATION) EVERY 5 MIN PRN
Status: DISCONTINUED | OUTPATIENT
Start: 2023-01-01 | End: 2023-01-01

## 2023-01-01 RX ORDER — POTASSIUM CHLORIDE 1.5 G/1.58G
20 POWDER, FOR SOLUTION ORAL ONCE
Status: COMPLETED | OUTPATIENT
Start: 2023-01-01 | End: 2023-01-01

## 2023-01-01 RX ORDER — MORPHINE SULFATE 10 MG/5ML
10 SOLUTION ORAL
Status: DISCONTINUED | OUTPATIENT
Start: 2023-01-01 | End: 2023-01-01

## 2023-01-01 RX ORDER — FUROSEMIDE 40 MG
40 TABLET ORAL DAILY
Status: DISCONTINUED | OUTPATIENT
Start: 2023-01-01 | End: 2023-01-01 | Stop reason: HOSPADM

## 2023-01-01 RX ORDER — PROCHLORPERAZINE 25 MG
12.5 SUPPOSITORY, RECTAL RECTAL EVERY 12 HOURS PRN
Status: DISCONTINUED | OUTPATIENT
Start: 2023-01-01 | End: 2023-01-01 | Stop reason: HOSPADM

## 2023-01-01 RX ORDER — ASPIRIN 81 MG/1
81 TABLET ORAL DAILY
Status: DISCONTINUED | OUTPATIENT
Start: 2023-01-01 | End: 2023-01-01 | Stop reason: HOSPADM

## 2023-01-01 RX ORDER — HEPARIN SODIUM 10000 [USP'U]/100ML
100-1000 INJECTION, SOLUTION INTRAVENOUS CONTINUOUS PRN
Status: DISCONTINUED | OUTPATIENT
Start: 2023-01-01 | End: 2023-01-01 | Stop reason: HOSPADM

## 2023-01-01 RX ORDER — FUROSEMIDE 10 MG/ML
20 INJECTION INTRAMUSCULAR; INTRAVENOUS EVERY 8 HOURS
Status: COMPLETED | OUTPATIENT
Start: 2023-01-01 | End: 2023-01-01

## 2023-01-01 RX ORDER — POTASSIUM CHLORIDE 1500 MG/1
40 TABLET, EXTENDED RELEASE ORAL
Status: COMPLETED | OUTPATIENT
Start: 2023-01-01 | End: 2023-01-01

## 2023-01-01 RX ORDER — SPIRONOLACTONE 25 MG
12.5 TABLET ORAL DAILY
Status: DISCONTINUED | OUTPATIENT
Start: 2023-01-01 | End: 2023-01-01 | Stop reason: ALTCHOICE

## 2023-01-01 RX ORDER — SODIUM CHLORIDE 9 MG/ML
75 INJECTION, SOLUTION INTRAVENOUS CONTINUOUS
Status: DISCONTINUED | OUTPATIENT
Start: 2023-01-01 | End: 2023-01-01

## 2023-01-01 RX ORDER — BISACODYL 5 MG
15 TABLET, DELAYED RELEASE (ENTERIC COATED) ORAL DAILY PRN
Status: DISCONTINUED | OUTPATIENT
Start: 2023-01-01 | End: 2023-01-01 | Stop reason: HOSPADM

## 2023-01-01 RX ORDER — GLYCOPYRROLATE 1 MG/1
2 TABLET ORAL EVERY 4 HOURS PRN
Status: DISCONTINUED | OUTPATIENT
Start: 2023-01-01 | End: 2023-01-01 | Stop reason: HOSPADM

## 2023-01-01 RX ORDER — PIPERACILLIN SODIUM, TAZOBACTAM SODIUM 4; .5 G/20ML; G/20ML
4.5 INJECTION, POWDER, LYOPHILIZED, FOR SOLUTION INTRAVENOUS ONCE
Status: COMPLETED | OUTPATIENT
Start: 2023-01-01 | End: 2023-01-01

## 2023-01-01 RX ORDER — ROSUVASTATIN CALCIUM 5 MG/1
5 TABLET, COATED ORAL EVERY EVENING
Status: ON HOLD | COMMUNITY
End: 2023-01-01

## 2023-01-01 RX ORDER — DEXTROSE MONOHYDRATE 25 G/50ML
25-50 INJECTION, SOLUTION INTRAVENOUS
Status: DISCONTINUED | OUTPATIENT
Start: 2023-01-01 | End: 2023-01-01 | Stop reason: ALTCHOICE

## 2023-01-01 RX ORDER — BISACODYL 5 MG
10 TABLET, DELAYED RELEASE (ENTERIC COATED) ORAL DAILY PRN
Status: DISCONTINUED | OUTPATIENT
Start: 2023-01-01 | End: 2023-01-01 | Stop reason: HOSPADM

## 2023-01-01 RX ORDER — ASPIRIN 325 MG
325 TABLET ORAL ONCE
Status: DISCONTINUED | OUTPATIENT
Start: 2023-01-01 | End: 2023-01-01 | Stop reason: HOSPADM

## 2023-01-01 RX ORDER — LOSARTAN POTASSIUM 50 MG/1
50 TABLET ORAL DAILY
Status: DISCONTINUED | OUTPATIENT
Start: 2023-01-01 | End: 2023-01-01

## 2023-01-01 RX ORDER — IOPAMIDOL 755 MG/ML
53 INJECTION, SOLUTION INTRAVASCULAR ONCE
Status: COMPLETED | OUTPATIENT
Start: 2023-01-01 | End: 2023-01-01

## 2023-01-01 RX ORDER — LORAZEPAM 0.5 MG/1
0.5 TABLET ORAL EVERY 4 HOURS PRN
Qty: 30 TABLET | Refills: 0 | Status: SHIPPED | OUTPATIENT
Start: 2023-01-01

## 2023-01-01 RX ORDER — ONDANSETRON 2 MG/ML
4 INJECTION INTRAMUSCULAR; INTRAVENOUS EVERY 6 HOURS PRN
Status: DISCONTINUED | OUTPATIENT
Start: 2023-01-01 | End: 2023-01-01 | Stop reason: HOSPADM

## 2023-01-01 RX ORDER — ZOLEDRONIC ACID 5 MG/100ML
5 INJECTION, SOLUTION INTRAVENOUS ONCE
Status: ON HOLD | COMMUNITY
End: 2023-01-01

## 2023-01-01 RX ORDER — AMLODIPINE BESYLATE 5 MG/1
5 TABLET ORAL DAILY
Status: DISCONTINUED | OUTPATIENT
Start: 2023-01-01 | End: 2023-01-01

## 2023-01-01 RX ORDER — MORPHINE SULFATE 10 MG/5ML
5 SOLUTION ORAL
Status: DISCONTINUED | OUTPATIENT
Start: 2023-01-01 | End: 2023-01-01

## 2023-01-01 RX ORDER — POTASSIUM CHLORIDE 7.45 MG/ML
10 INJECTION INTRAVENOUS
Status: DISCONTINUED | OUTPATIENT
Start: 2023-01-01 | End: 2023-01-01

## 2023-01-01 RX ORDER — CARVEDILOL 6.25 MG/1
6.25 TABLET ORAL 2 TIMES DAILY WITH MEALS
Status: DISCONTINUED | OUTPATIENT
Start: 2023-01-01 | End: 2023-01-01

## 2023-01-01 RX ORDER — IBUPROFEN 600 MG/1
600 TABLET, FILM COATED ORAL EVERY 6 HOURS PRN
Status: ON HOLD | COMMUNITY
End: 2023-01-01

## 2023-01-01 RX ORDER — PANTOPRAZOLE SODIUM 40 MG/1
40 TABLET, DELAYED RELEASE ORAL DAILY
Status: DISCONTINUED | OUTPATIENT
Start: 2023-01-01 | End: 2023-01-01

## 2023-01-01 RX ORDER — METOPROLOL TARTRATE 1 MG/ML
5 INJECTION, SOLUTION INTRAVENOUS ONCE
Status: COMPLETED | OUTPATIENT
Start: 2023-01-01 | End: 2023-01-01

## 2023-01-01 RX ORDER — FLUMAZENIL 0.1 MG/ML
0.2 INJECTION, SOLUTION INTRAVENOUS
Status: ACTIVE | OUTPATIENT
Start: 2023-01-01 | End: 2023-01-01

## 2023-01-01 RX ORDER — ASPIRIN 81 MG/1
243 TABLET, CHEWABLE ORAL ONCE
Status: DISCONTINUED | OUTPATIENT
Start: 2023-01-01 | End: 2023-01-01 | Stop reason: HOSPADM

## 2023-01-01 RX ORDER — DEXTROSE MONOHYDRATE 25 G/50ML
25-50 INJECTION, SOLUTION INTRAVENOUS
Status: DISCONTINUED | OUTPATIENT
Start: 2023-01-01 | End: 2023-01-01 | Stop reason: HOSPADM

## 2023-01-01 RX ORDER — HYDROXYCHLOROQUINE SULFATE 200 MG/1
200 TABLET, FILM COATED ORAL EVERY EVENING
Status: DISCONTINUED | OUTPATIENT
Start: 2023-01-01 | End: 2023-01-01 | Stop reason: HOSPADM

## 2023-01-01 RX ORDER — HYDROXYCHLOROQUINE SULFATE 200 MG/1
200 TABLET, FILM COATED ORAL DAILY
Status: DISCONTINUED | OUTPATIENT
Start: 2023-01-01 | End: 2023-01-01

## 2023-01-01 RX ORDER — METHYLPREDNISOLONE SODIUM SUCCINATE 125 MG/2ML
125 INJECTION, POWDER, LYOPHILIZED, FOR SOLUTION INTRAMUSCULAR; INTRAVENOUS
Status: DISCONTINUED | OUTPATIENT
Start: 2023-01-01 | End: 2023-01-01 | Stop reason: HOSPADM

## 2023-01-01 RX ORDER — IOPAMIDOL 755 MG/ML
INJECTION, SOLUTION INTRAVASCULAR
Status: DISCONTINUED | OUTPATIENT
Start: 2023-01-01 | End: 2023-01-01 | Stop reason: HOSPADM

## 2023-01-01 RX ORDER — ONDANSETRON 4 MG/1
4 TABLET, ORALLY DISINTEGRATING ORAL EVERY 8 HOURS PRN
Status: ON HOLD | COMMUNITY
End: 2023-01-01

## 2023-01-01 RX ORDER — GABAPENTIN 100 MG/1
100 CAPSULE ORAL 2 TIMES DAILY
Status: ON HOLD | COMMUNITY
End: 2023-01-01

## 2023-01-01 RX ORDER — FUROSEMIDE 10 MG/ML
INJECTION INTRAMUSCULAR; INTRAVENOUS
Status: COMPLETED
Start: 2023-01-01 | End: 2023-01-01

## 2023-01-01 RX ORDER — LOPERAMIDE HCL 2 MG
4 CAPSULE ORAL 4 TIMES DAILY PRN
Status: DISCONTINUED | OUTPATIENT
Start: 2023-01-01 | End: 2023-01-01 | Stop reason: HOSPADM

## 2023-01-01 RX ORDER — SOLIFENACIN SUCCINATE 5 MG/1
5 TABLET, FILM COATED ORAL DAILY
Status: ON HOLD | COMMUNITY
End: 2023-01-01

## 2023-01-01 RX ORDER — FUROSEMIDE 40 MG
40 TABLET ORAL
Status: DISCONTINUED | OUTPATIENT
Start: 2023-01-01 | End: 2023-01-01

## 2023-01-01 RX ORDER — POTASSIUM CHLORIDE 1500 MG/1
40 TABLET, EXTENDED RELEASE ORAL
Status: DISCONTINUED | OUTPATIENT
Start: 2023-01-01 | End: 2023-01-01

## 2023-01-01 RX ORDER — LOPERAMIDE HCL 2 MG
4 CAPSULE ORAL
Status: DISCONTINUED | OUTPATIENT
Start: 2023-01-01 | End: 2023-01-01

## 2023-01-01 RX ORDER — LORAZEPAM 2 MG/ML
0.5 INJECTION INTRAMUSCULAR
Status: DISCONTINUED | OUTPATIENT
Start: 2023-01-01 | End: 2023-01-01 | Stop reason: HOSPADM

## 2023-01-01 RX ORDER — HYDRALAZINE HYDROCHLORIDE 25 MG/1
25 TABLET, FILM COATED ORAL 4 TIMES DAILY
Status: DISCONTINUED | OUTPATIENT
Start: 2023-01-01 | End: 2023-01-01

## 2023-01-01 RX ORDER — TOLTERODINE 2 MG/1
2 CAPSULE, EXTENDED RELEASE ORAL DAILY
Status: DISCONTINUED | OUTPATIENT
Start: 2023-01-01 | End: 2023-01-01

## 2023-01-01 RX ORDER — CALCIUM GLUCONATE 20 MG/ML
1 INJECTION, SOLUTION INTRAVENOUS ONCE
Status: COMPLETED | OUTPATIENT
Start: 2023-01-01 | End: 2023-01-01

## 2023-01-01 RX ORDER — MORPHINE SULFATE 2 MG/ML
1 INJECTION, SOLUTION INTRAMUSCULAR; INTRAVENOUS
Status: DISCONTINUED | OUTPATIENT
Start: 2023-01-01 | End: 2023-01-01 | Stop reason: HOSPADM

## 2023-01-01 RX ORDER — MULTIVITAMIN,THERAPEUTIC
1 TABLET ORAL DAILY
Status: ON HOLD | COMMUNITY
End: 2023-01-01

## 2023-01-01 RX ORDER — CAFFEINE CITRATE 20 MG/ML
60 SOLUTION INTRAVENOUS
Status: DISCONTINUED | OUTPATIENT
Start: 2023-01-01 | End: 2023-01-01

## 2023-01-01 RX ORDER — ACETAMINOPHEN 650 MG/1
650 SUPPOSITORY RECTAL EVERY 6 HOURS PRN
Status: DISCONTINUED | OUTPATIENT
Start: 2023-01-01 | End: 2023-01-01 | Stop reason: HOSPADM

## 2023-01-01 RX ORDER — METOPROLOL SUCCINATE 50 MG/1
50 TABLET, EXTENDED RELEASE ORAL EVERY EVENING
Status: DISCONTINUED | OUTPATIENT
Start: 2023-01-01 | End: 2023-01-01 | Stop reason: HOSPADM

## 2023-01-01 RX ORDER — LANOLIN ALCOHOL/MO/W.PET/CERES
3 CREAM (GRAM) TOPICAL
Status: DISCONTINUED | OUTPATIENT
Start: 2023-01-01 | End: 2023-01-01 | Stop reason: HOSPADM

## 2023-01-01 RX ORDER — POTASSIUM CHLORIDE 1500 MG/1
80 TABLET, EXTENDED RELEASE ORAL ONCE
Status: DISCONTINUED | OUTPATIENT
Start: 2023-01-01 | End: 2023-01-01

## 2023-01-01 RX ORDER — CEFTRIAXONE 2 G/1
2 INJECTION, POWDER, FOR SOLUTION INTRAMUSCULAR; INTRAVENOUS EVERY 24 HOURS
Status: DISCONTINUED | OUTPATIENT
Start: 2023-01-01 | End: 2023-01-01

## 2023-01-01 RX ORDER — REGADENOSON 0.08 MG/ML
0.4 INJECTION, SOLUTION INTRAVENOUS ONCE
Status: COMPLETED | OUTPATIENT
Start: 2023-01-01 | End: 2023-01-01

## 2023-01-01 RX ORDER — HALOPERIDOL 0.5 MG/1
1 TABLET ORAL EVERY 6 HOURS PRN
Qty: 30 TABLET | Refills: 0 | Status: SHIPPED | OUTPATIENT
Start: 2023-01-01

## 2023-01-01 RX ORDER — SPIRONOLACTONE 25 MG
12.5 TABLET ORAL DAILY
Status: DISCONTINUED | OUTPATIENT
Start: 2023-01-01 | End: 2023-01-01 | Stop reason: HOSPADM

## 2023-01-01 RX ORDER — VANCOMYCIN HYDROCHLORIDE 1 G/200ML
1000 INJECTION, SOLUTION INTRAVENOUS ONCE
Status: COMPLETED | OUTPATIENT
Start: 2023-01-01 | End: 2023-01-01

## 2023-01-01 RX ORDER — CLOPIDOGREL BISULFATE 75 MG/1
75 TABLET ORAL DAILY
Status: ON HOLD | COMMUNITY
End: 2023-01-01

## 2023-01-01 RX ORDER — FERROUS GLUCONATE 324(38)MG
324 TABLET ORAL DAILY
Status: ON HOLD | COMMUNITY
End: 2023-01-01

## 2023-01-01 RX ORDER — ACETAMINOPHEN 500 MG
1000 TABLET ORAL EVERY 6 HOURS PRN
Start: 2023-01-01

## 2023-01-01 RX ORDER — METOPROLOL SUCCINATE 50 MG/1
75 TABLET, EXTENDED RELEASE ORAL DAILY
Status: ON HOLD | COMMUNITY
End: 2023-01-01

## 2023-01-01 RX ORDER — AMLODIPINE BESYLATE 2.5 MG/1
2.5 TABLET ORAL DAILY
Status: ON HOLD | COMMUNITY
End: 2023-01-01

## 2023-01-01 RX ORDER — MORPHINE SULFATE 2 MG/ML
2 INJECTION, SOLUTION INTRAMUSCULAR; INTRAVENOUS
Status: DISCONTINUED | OUTPATIENT
Start: 2023-01-01 | End: 2023-01-01 | Stop reason: HOSPADM

## 2023-01-01 RX ORDER — ACETAMINOPHEN 325 MG/1
650 TABLET ORAL EVERY 4 HOURS PRN
Status: DISCONTINUED | OUTPATIENT
Start: 2023-01-01 | End: 2023-01-01 | Stop reason: HOSPADM

## 2023-01-01 RX ORDER — LOSARTAN POTASSIUM 25 MG/1
25 TABLET ORAL ONCE
Status: COMPLETED | OUTPATIENT
Start: 2023-01-01 | End: 2023-01-01

## 2023-01-01 RX ORDER — HYDRALAZINE HYDROCHLORIDE 20 MG/ML
5-10 INJECTION INTRAMUSCULAR; INTRAVENOUS EVERY 6 HOURS PRN
Status: DISCONTINUED | OUTPATIENT
Start: 2023-01-01 | End: 2023-01-01 | Stop reason: HOSPADM

## 2023-01-01 RX ORDER — HEPARIN SODIUM 5000 [USP'U]/.5ML
5000 INJECTION, SOLUTION INTRAVENOUS; SUBCUTANEOUS EVERY 12 HOURS
Status: DISCONTINUED | OUTPATIENT
Start: 2023-01-01 | End: 2023-01-01

## 2023-01-01 RX ORDER — POTASSIUM CHLORIDE 7.45 MG/ML
10 INJECTION INTRAVENOUS
Status: COMPLETED | OUTPATIENT
Start: 2023-01-01 | End: 2023-01-01

## 2023-01-01 RX ORDER — CALCIUM GLUCONATE 20 MG/ML
2 INJECTION, SOLUTION INTRAVENOUS ONCE
Status: COMPLETED | OUTPATIENT
Start: 2023-01-01 | End: 2023-01-01

## 2023-01-01 RX ORDER — HEPARIN SODIUM 5000 [USP'U]/.5ML
5000 INJECTION, SOLUTION INTRAVENOUS; SUBCUTANEOUS EVERY 8 HOURS
Status: DISCONTINUED | OUTPATIENT
Start: 2023-01-01 | End: 2023-01-01

## 2023-01-01 RX ORDER — HYDROXYCHLOROQUINE SULFATE 200 MG/1
200 TABLET, FILM COATED ORAL DAILY
Status: ON HOLD | COMMUNITY
End: 2023-01-01

## 2023-01-01 RX ORDER — DIPHENHYDRAMINE HYDROCHLORIDE 50 MG/ML
50 INJECTION INTRAMUSCULAR; INTRAVENOUS
Status: DISCONTINUED | OUTPATIENT
Start: 2023-01-01 | End: 2023-01-01 | Stop reason: HOSPADM

## 2023-01-01 RX ORDER — PIPERACILLIN SODIUM, TAZOBACTAM SODIUM 3; .375 G/15ML; G/15ML
3.38 INJECTION, POWDER, LYOPHILIZED, FOR SOLUTION INTRAVENOUS EVERY 6 HOURS
Status: DISCONTINUED | OUTPATIENT
Start: 2023-01-01 | End: 2023-01-01

## 2023-01-01 RX ORDER — LEFLUNOMIDE 20 MG/1
20 TABLET ORAL EVERY EVENING
Status: DISCONTINUED | OUTPATIENT
Start: 2023-01-01 | End: 2023-01-01 | Stop reason: HOSPADM

## 2023-01-01 RX ORDER — FUROSEMIDE 40 MG
20 TABLET ORAL DAILY
Start: 2023-01-01

## 2023-01-01 RX ORDER — ONDANSETRON 4 MG/1
4 TABLET, ORALLY DISINTEGRATING ORAL EVERY 8 HOURS PRN
Qty: 15 TABLET | Refills: 0 | Status: SHIPPED | OUTPATIENT
Start: 2023-01-01 | End: 2023-01-01

## 2023-01-01 RX ORDER — IOPAMIDOL 755 MG/ML
50 INJECTION, SOLUTION INTRAVASCULAR ONCE
Status: COMPLETED | OUTPATIENT
Start: 2023-01-01 | End: 2023-01-01

## 2023-01-01 RX ORDER — POLYETHYLENE GLYCOL 3350 17 G/17G
17 POWDER, FOR SOLUTION ORAL DAILY PRN
Status: DISCONTINUED | OUTPATIENT
Start: 2023-01-01 | End: 2023-01-01 | Stop reason: HOSPADM

## 2023-01-01 RX ORDER — CLOPIDOGREL BISULFATE 75 MG/1
75 TABLET ORAL EVERY EVENING
Status: DISCONTINUED | OUTPATIENT
Start: 2023-01-01 | End: 2023-01-01 | Stop reason: HOSPADM

## 2023-01-01 RX ORDER — PROCHLORPERAZINE MALEATE 5 MG
5 TABLET ORAL EVERY 6 HOURS PRN
Status: DISCONTINUED | OUTPATIENT
Start: 2023-01-01 | End: 2023-01-01 | Stop reason: HOSPADM

## 2023-01-01 RX ORDER — ALBUTEROL SULFATE 90 UG/1
6 AEROSOL, METERED RESPIRATORY (INHALATION) EVERY 4 HOURS PRN
Status: DISCONTINUED | OUTPATIENT
Start: 2023-01-01 | End: 2023-01-01 | Stop reason: HOSPADM

## 2023-01-01 RX ORDER — NICOTINE POLACRILEX 4 MG
15-30 LOZENGE BUCCAL
Status: DISCONTINUED | OUTPATIENT
Start: 2023-01-01 | End: 2023-01-01 | Stop reason: ALTCHOICE

## 2023-01-01 RX ORDER — CHOLECALCIFEROL (VITAMIN D3) 50 MCG
1 TABLET ORAL DAILY
Status: ON HOLD | COMMUNITY
End: 2023-01-01

## 2023-01-01 RX ORDER — LEFLUNOMIDE 20 MG/1
20 TABLET ORAL DAILY
Status: DISCONTINUED | OUTPATIENT
Start: 2023-01-01 | End: 2023-01-01 | Stop reason: ALTCHOICE

## 2023-01-01 RX ORDER — AMLODIPINE BESYLATE 2.5 MG/1
2.5 TABLET ORAL DAILY
Status: DISCONTINUED | OUTPATIENT
Start: 2023-01-01 | End: 2023-01-01 | Stop reason: HOSPADM

## 2023-01-01 RX ORDER — SODIUM CHLORIDE 1 G/1
1 TABLET ORAL 2 TIMES DAILY
Status: ON HOLD | COMMUNITY
End: 2023-01-01

## 2023-01-01 RX ORDER — ACYCLOVIR 200 MG/1
0-1 CAPSULE ORAL
Status: DISCONTINUED | OUTPATIENT
Start: 2023-01-01 | End: 2023-01-01

## 2023-01-01 RX ADMIN — LEFLUNOMIDE 20 MG: 20 TABLET ORAL at 21:19

## 2023-01-01 RX ADMIN — POTASSIUM CHLORIDE 10 MEQ: 7.46 INJECTION, SOLUTION INTRAVENOUS at 06:51

## 2023-01-01 RX ADMIN — CALCIUM GLUCONATE 1 G: 20 INJECTION, SOLUTION INTRAVENOUS at 16:33

## 2023-01-01 RX ADMIN — HYALURONIDASE (HUMAN RECOMBINANT) 150 UNITS: 150 INJECTION, SOLUTION SUBCUTANEOUS at 11:18

## 2023-01-01 RX ADMIN — VANCOMYCIN HYDROCHLORIDE 750 MG: 1 INJECTION, POWDER, LYOPHILIZED, FOR SOLUTION INTRAVENOUS at 20:48

## 2023-01-01 RX ADMIN — GABAPENTIN 100 MG: 100 CAPSULE ORAL at 08:08

## 2023-01-01 RX ADMIN — PIPERACILLIN AND TAZOBACTAM 3.38 G: 3; .375 INJECTION, POWDER, FOR SOLUTION INTRAVENOUS at 09:27

## 2023-01-01 RX ADMIN — ASPIRIN 81 MG CHEWABLE TABLET 81 MG: 81 TABLET CHEWABLE at 08:14

## 2023-01-01 RX ADMIN — CHLORHEXIDINE GLUCONATE 15 ML: 1.2 SOLUTION ORAL at 12:19

## 2023-01-01 RX ADMIN — HYDROXYCHLOROQUINE SULFATE 200 MG: 200 TABLET, FILM COATED ORAL at 08:19

## 2023-01-01 RX ADMIN — PANTOPRAZOLE SODIUM 40 MG: 40 TABLET, DELAYED RELEASE ORAL at 08:31

## 2023-01-01 RX ADMIN — CARVEDILOL 12.5 MG: 6.25 TABLET, FILM COATED ORAL at 17:35

## 2023-01-01 RX ADMIN — CLOPIDOGREL BISULFATE 75 MG: 75 TABLET ORAL at 20:12

## 2023-01-01 RX ADMIN — PANTOPRAZOLE SODIUM 40 MG: 40 TABLET, DELAYED RELEASE ORAL at 08:14

## 2023-01-01 RX ADMIN — FUROSEMIDE 40 MG: 10 INJECTION, SOLUTION INTRAMUSCULAR; INTRAVENOUS at 02:44

## 2023-01-01 RX ADMIN — MORPHINE SULFATE 5 MG: 100 SOLUTION ORAL at 13:14

## 2023-01-01 RX ADMIN — HEPARIN SODIUM 5000 UNITS: 5000 INJECTION, SOLUTION INTRAVENOUS; SUBCUTANEOUS at 13:33

## 2023-01-01 RX ADMIN — SULFAMETHOXAZOLE AND TRIMETHOPRIM 1 TABLET: 800; 160 TABLET ORAL at 08:24

## 2023-01-01 RX ADMIN — GABAPENTIN 100 MG: 100 CAPSULE ORAL at 08:31

## 2023-01-01 RX ADMIN — TOLTERODINE 2 MG: 2 CAPSULE, EXTENDED RELEASE ORAL at 08:33

## 2023-01-01 RX ADMIN — TOLTERODINE 2 MG: 2 CAPSULE, EXTENDED RELEASE ORAL at 08:01

## 2023-01-01 RX ADMIN — METOPROLOL TARTRATE 25 MG: 25 TABLET, FILM COATED ORAL at 08:55

## 2023-01-01 RX ADMIN — PIPERACILLIN AND TAZOBACTAM 3.38 G: 3; .375 INJECTION, POWDER, FOR SOLUTION INTRAVENOUS at 03:43

## 2023-01-01 RX ADMIN — FUROSEMIDE 40 MG: 40 TABLET ORAL at 08:32

## 2023-01-01 RX ADMIN — FUROSEMIDE 80 MG: 10 INJECTION, SOLUTION INTRAMUSCULAR; INTRAVENOUS at 18:27

## 2023-01-01 RX ADMIN — PANTOPRAZOLE SODIUM 40 MG: 40 TABLET, DELAYED RELEASE ORAL at 09:53

## 2023-01-01 RX ADMIN — HEPARIN SODIUM 5000 UNITS: 5000 INJECTION, SOLUTION INTRAVENOUS; SUBCUTANEOUS at 12:20

## 2023-01-01 RX ADMIN — FUROSEMIDE 20 MG: 20 TABLET ORAL at 09:45

## 2023-01-01 RX ADMIN — ONDANSETRON 4 MG: 4 TABLET, ORALLY DISINTEGRATING ORAL at 04:55

## 2023-01-01 RX ADMIN — FUROSEMIDE 40 MG: 40 TABLET ORAL at 16:40

## 2023-01-01 RX ADMIN — GABAPENTIN 100 MG: 100 CAPSULE ORAL at 07:38

## 2023-01-01 RX ADMIN — GABAPENTIN 100 MG: 100 CAPSULE ORAL at 09:31

## 2023-01-01 RX ADMIN — ROSUVASTATIN CALCIUM 10 MG: 10 TABLET, FILM COATED ORAL at 19:41

## 2023-01-01 RX ADMIN — CARVEDILOL 6.25 MG: 6.25 TABLET, FILM COATED ORAL at 11:18

## 2023-01-01 RX ADMIN — Medication 2 CAPSULE: at 09:02

## 2023-01-01 RX ADMIN — GABAPENTIN 100 MG: 100 CAPSULE ORAL at 20:03

## 2023-01-01 RX ADMIN — GABAPENTIN 100 MG: 100 CAPSULE ORAL at 20:57

## 2023-01-01 RX ADMIN — FENTANYL CITRATE 25 MCG: 50 INJECTION, SOLUTION INTRAMUSCULAR; INTRAVENOUS at 15:36

## 2023-01-01 RX ADMIN — FUROSEMIDE 80 MG: 40 TABLET ORAL at 16:05

## 2023-01-01 RX ADMIN — POTASSIUM CHLORIDE 40 MEQ: 1500 TABLET, EXTENDED RELEASE ORAL at 10:00

## 2023-01-01 RX ADMIN — METOPROLOL TARTRATE 25 MG: 25 TABLET, FILM COATED ORAL at 09:31

## 2023-01-01 RX ADMIN — SODIUM CHLORIDE 75 ML/HR: 9 INJECTION, SOLUTION INTRAVENOUS at 14:04

## 2023-01-01 RX ADMIN — HYDRALAZINE HYDROCHLORIDE 25 MG: 25 TABLET ORAL at 09:52

## 2023-01-01 RX ADMIN — PANTOPRAZOLE SODIUM 40 MG: 40 TABLET, DELAYED RELEASE ORAL at 06:53

## 2023-01-01 RX ADMIN — TOLTERODINE 2 MG: 2 CAPSULE, EXTENDED RELEASE ORAL at 07:38

## 2023-01-01 RX ADMIN — HYDROXYCHLOROQUINE SULFATE 200 MG: 200 TABLET, FILM COATED ORAL at 08:32

## 2023-01-01 RX ADMIN — CLOPIDOGREL BISULFATE 75 MG: 75 TABLET ORAL at 20:55

## 2023-01-01 RX ADMIN — PANTOPRAZOLE SODIUM 40 MG: 40 TABLET, DELAYED RELEASE ORAL at 08:20

## 2023-01-01 RX ADMIN — SODIUM CHLORIDE 10 ML/HR: 9 INJECTION, SOLUTION INTRAVENOUS at 11:14

## 2023-01-01 RX ADMIN — ROSUVASTATIN CALCIUM 10 MG: 10 TABLET, FILM COATED ORAL at 20:03

## 2023-01-01 RX ADMIN — ROSUVASTATIN CALCIUM 10 MG: 10 TABLET, FILM COATED ORAL at 20:04

## 2023-01-01 RX ADMIN — LOPERAMIDE HYDROCHLORIDE 4 MG: 2 CAPSULE ORAL at 08:00

## 2023-01-01 RX ADMIN — ASPIRIN 81 MG CHEWABLE TABLET 81 MG: 81 TABLET CHEWABLE at 10:32

## 2023-01-01 RX ADMIN — LOPERAMIDE HYDROCHLORIDE 2 MG: 2 CAPSULE ORAL at 08:12

## 2023-01-01 RX ADMIN — TOLTERODINE 2 MG: 2 CAPSULE, EXTENDED RELEASE ORAL at 08:08

## 2023-01-01 RX ADMIN — CARVEDILOL 6.25 MG: 6.25 TABLET, FILM COATED ORAL at 08:15

## 2023-01-01 RX ADMIN — FUROSEMIDE 40 MG: 40 TABLET ORAL at 17:05

## 2023-01-01 RX ADMIN — FUROSEMIDE 40 MG: 40 TABLET ORAL at 08:39

## 2023-01-01 RX ADMIN — LOPERAMIDE HYDROCHLORIDE 4 MG: 2 CAPSULE ORAL at 08:07

## 2023-01-01 RX ADMIN — Medication 50 MCG/HR: at 00:30

## 2023-01-01 RX ADMIN — IOPAMIDOL 50 ML: 755 INJECTION, SOLUTION INTRAVENOUS at 20:42

## 2023-01-01 RX ADMIN — PIPERACILLIN AND TAZOBACTAM 3.38 G: 3; .375 INJECTION, POWDER, FOR SOLUTION INTRAVENOUS at 21:43

## 2023-01-01 RX ADMIN — LOSARTAN POTASSIUM 50 MG: 50 TABLET, FILM COATED ORAL at 08:32

## 2023-01-01 RX ADMIN — GABAPENTIN 100 MG: 100 CAPSULE ORAL at 08:01

## 2023-01-01 RX ADMIN — CEFTRIAXONE SODIUM 2 G: 2 INJECTION, POWDER, FOR SOLUTION INTRAMUSCULAR; INTRAVENOUS at 16:04

## 2023-01-01 RX ADMIN — MORPHINE SULFATE 1 MG: 2 INJECTION, SOLUTION INTRAMUSCULAR; INTRAVENOUS at 22:40

## 2023-01-01 RX ADMIN — ROSUVASTATIN CALCIUM 10 MG: 10 TABLET, FILM COATED ORAL at 20:47

## 2023-01-01 RX ADMIN — MORPHINE SULFATE 5 MG: 100 SOLUTION ORAL at 08:31

## 2023-01-01 RX ADMIN — METOPROLOL TARTRATE 25 MG: 25 TABLET, FILM COATED ORAL at 20:12

## 2023-01-01 RX ADMIN — MAGNESIUM SULFATE HEPTAHYDRATE 2 G: 40 INJECTION, SOLUTION INTRAVENOUS at 12:14

## 2023-01-01 RX ADMIN — LOSARTAN POTASSIUM 50 MG: 50 TABLET, FILM COATED ORAL at 08:12

## 2023-01-01 RX ADMIN — GABAPENTIN 100 MG: 100 CAPSULE ORAL at 08:14

## 2023-01-01 RX ADMIN — Medication 2 CAPSULE: at 13:30

## 2023-01-01 RX ADMIN — HYDRALAZINE HYDROCHLORIDE 10 MG: 20 INJECTION INTRAMUSCULAR; INTRAVENOUS at 09:08

## 2023-01-01 RX ADMIN — TOLTERODINE 2 MG: 2 CAPSULE, EXTENDED RELEASE ORAL at 08:19

## 2023-01-01 RX ADMIN — LIDOCAINE HYDROCHLORIDE ANHYDROUS 1 ML: 10 INJECTION, SOLUTION INFILTRATION at 01:18

## 2023-01-01 RX ADMIN — FUROSEMIDE 40 MG: 40 TABLET ORAL at 09:31

## 2023-01-01 RX ADMIN — GABAPENTIN 100 MG: 100 CAPSULE ORAL at 08:32

## 2023-01-01 RX ADMIN — ASPIRIN 81 MG: 81 TABLET, COATED ORAL at 08:32

## 2023-01-01 RX ADMIN — IRON SUCROSE 300 MG: 20 INJECTION, SOLUTION INTRAVENOUS at 13:31

## 2023-01-01 RX ADMIN — ASPIRIN 81 MG: 81 TABLET, COATED ORAL at 14:42

## 2023-01-01 RX ADMIN — DEXTROSE AND SODIUM CHLORIDE: 5; 900 INJECTION, SOLUTION INTRAVENOUS at 01:38

## 2023-01-01 RX ADMIN — METOPROLOL SUCCINATE 50 MG: 50 TABLET, EXTENDED RELEASE ORAL at 08:01

## 2023-01-01 RX ADMIN — CARVEDILOL 25 MG: 25 TABLET, FILM COATED ORAL at 19:12

## 2023-01-01 RX ADMIN — POTASSIUM CHLORIDE 40 MEQ: 1500 TABLET, EXTENDED RELEASE ORAL at 09:28

## 2023-01-01 RX ADMIN — LOSARTAN POTASSIUM 50 MG: 50 TABLET, FILM COATED ORAL at 09:02

## 2023-01-01 RX ADMIN — PROPOFOL 30 MCG/KG/MIN: 10 INJECTION, EMULSION INTRAVENOUS at 08:53

## 2023-01-01 RX ADMIN — HYDROXYCHLOROQUINE SULFATE 200 MG: 200 TABLET ORAL at 20:54

## 2023-01-01 RX ADMIN — Medication 2 CAPSULE: at 09:45

## 2023-01-01 RX ADMIN — PIPERACILLIN AND TAZOBACTAM 3.38 G: 3; .375 INJECTION, POWDER, FOR SOLUTION INTRAVENOUS at 10:26

## 2023-01-01 RX ADMIN — SPIRONOLACTONE 12.5 MG: 25 TABLET ORAL at 09:03

## 2023-01-01 RX ADMIN — LOPERAMIDE HYDROCHLORIDE 4 MG: 2 CAPSULE ORAL at 08:14

## 2023-01-01 RX ADMIN — METOPROLOL TARTRATE 25 MG: 25 TABLET, FILM COATED ORAL at 20:09

## 2023-01-01 RX ADMIN — ASPIRIN 81 MG: 81 TABLET, COATED ORAL at 16:25

## 2023-01-01 RX ADMIN — GABAPENTIN 100 MG: 100 CAPSULE ORAL at 09:02

## 2023-01-01 RX ADMIN — GABAPENTIN 100 MG: 100 CAPSULE ORAL at 20:04

## 2023-01-01 RX ADMIN — ASPIRIN 81 MG CHEWABLE TABLET 81 MG: 81 TABLET CHEWABLE at 08:32

## 2023-01-01 RX ADMIN — FUROSEMIDE 20 MG: 10 INJECTION, SOLUTION INTRAMUSCULAR; INTRAVENOUS at 15:24

## 2023-01-01 RX ADMIN — METOPROLOL TARTRATE 25 MG: 25 TABLET, FILM COATED ORAL at 20:04

## 2023-01-01 RX ADMIN — FUROSEMIDE 40 MG: 10 INJECTION, SOLUTION INTRAMUSCULAR; INTRAVENOUS at 10:16

## 2023-01-01 RX ADMIN — HEPARIN SODIUM 5000 UNITS: 5000 INJECTION, SOLUTION INTRAVENOUS; SUBCUTANEOUS at 00:59

## 2023-01-01 RX ADMIN — LOPERAMIDE HYDROCHLORIDE 4 MG: 2 CAPSULE ORAL at 08:33

## 2023-01-01 RX ADMIN — CARVEDILOL 12.5 MG: 6.25 TABLET, FILM COATED ORAL at 18:28

## 2023-01-01 RX ADMIN — GABAPENTIN 100 MG: 100 CAPSULE ORAL at 19:41

## 2023-01-01 RX ADMIN — HUMAN ALBUMIN MICROSPHERES AND PERFLUTREN 6 ML: 10; .22 INJECTION, SOLUTION INTRAVENOUS at 09:49

## 2023-01-01 RX ADMIN — Medication 400 MG: at 10:31

## 2023-01-01 RX ADMIN — GABAPENTIN 100 MG: 100 CAPSULE ORAL at 21:19

## 2023-01-01 RX ADMIN — ROSUVASTATIN CALCIUM 10 MG: 10 TABLET, FILM COATED ORAL at 21:03

## 2023-01-01 RX ADMIN — FUROSEMIDE 40 MG: 10 INJECTION, SOLUTION INTRAMUSCULAR; INTRAVENOUS at 08:54

## 2023-01-01 RX ADMIN — AMLODIPINE BESYLATE 2.5 MG: 2.5 TABLET ORAL at 08:39

## 2023-01-01 RX ADMIN — Medication 400 MG: at 09:02

## 2023-01-01 RX ADMIN — Medication 400 MG: at 15:36

## 2023-01-01 RX ADMIN — GABAPENTIN 100 MG: 100 CAPSULE ORAL at 20:12

## 2023-01-01 RX ADMIN — POTASSIUM CHLORIDE 20 MEQ: 1500 TABLET, EXTENDED RELEASE ORAL at 01:40

## 2023-01-01 RX ADMIN — MAGNESIUM SULFATE HEPTAHYDRATE 2 G: 40 INJECTION, SOLUTION INTRAVENOUS at 05:07

## 2023-01-01 RX ADMIN — PIPERACILLIN AND TAZOBACTAM 3.38 G: 3; .375 INJECTION, POWDER, FOR SOLUTION INTRAVENOUS at 16:25

## 2023-01-01 RX ADMIN — LOSARTAN POTASSIUM 50 MG: 50 TABLET, FILM COATED ORAL at 08:08

## 2023-01-01 RX ADMIN — ASPIRIN 81 MG: 81 TABLET, COATED ORAL at 08:12

## 2023-01-01 RX ADMIN — TOLTERODINE 2 MG: 2 CAPSULE, EXTENDED RELEASE ORAL at 08:14

## 2023-01-01 RX ADMIN — CALCIUM GLUCONATE 2 G: 20 INJECTION, SOLUTION INTRAVENOUS at 08:01

## 2023-01-01 RX ADMIN — GABAPENTIN 100 MG: 100 CAPSULE ORAL at 20:24

## 2023-01-01 RX ADMIN — ACETAMINOPHEN 1000 MG: 500 TABLET, FILM COATED ORAL at 18:34

## 2023-01-01 RX ADMIN — ASPIRIN 81 MG: 81 TABLET, COATED ORAL at 09:31

## 2023-01-01 RX ADMIN — GABAPENTIN 100 MG: 100 CAPSULE ORAL at 12:25

## 2023-01-01 RX ADMIN — Medication 400 MG: at 05:09

## 2023-01-01 RX ADMIN — CARVEDILOL 12.5 MG: 6.25 TABLET, FILM COATED ORAL at 08:23

## 2023-01-01 RX ADMIN — Medication 25 MCG: at 00:00

## 2023-01-01 RX ADMIN — METOPROLOL TARTRATE 25 MG: 25 TABLET, FILM COATED ORAL at 21:03

## 2023-01-01 RX ADMIN — LOSARTAN POTASSIUM 25 MG: 25 TABLET, FILM COATED ORAL at 09:53

## 2023-01-01 RX ADMIN — SODIUM CHLORIDE 1000 ML: 9 INJECTION, SOLUTION INTRAVENOUS at 22:54

## 2023-01-01 RX ADMIN — POTASSIUM CHLORIDE 20 MEQ: 1500 TABLET, EXTENDED RELEASE ORAL at 08:08

## 2023-01-01 RX ADMIN — LEFLUNOMIDE 20 MG: 20 TABLET ORAL at 20:54

## 2023-01-01 RX ADMIN — HYDROXYCHLOROQUINE SULFATE 200 MG: 200 TABLET, FILM COATED ORAL at 08:08

## 2023-01-01 RX ADMIN — MORPHINE SULFATE 2 MG: 2 INJECTION, SOLUTION INTRAMUSCULAR; INTRAVENOUS at 00:35

## 2023-01-01 RX ADMIN — POTASSIUM CHLORIDE 10 MEQ: 20 SOLUTION ORAL at 11:37

## 2023-01-01 RX ADMIN — PANTOPRAZOLE SODIUM 40 MG: 40 TABLET, DELAYED RELEASE ORAL at 06:39

## 2023-01-01 RX ADMIN — PIPERACILLIN AND TAZOBACTAM 3.38 G: 3; .375 INJECTION, POWDER, FOR SOLUTION INTRAVENOUS at 04:32

## 2023-01-01 RX ADMIN — SODIUM CHLORIDE 1000 ML: 9 INJECTION, SOLUTION INTRAVENOUS at 17:34

## 2023-01-01 RX ADMIN — GABAPENTIN 100 MG: 100 CAPSULE ORAL at 08:20

## 2023-01-01 RX ADMIN — PANTOPRAZOLE SODIUM 40 MG: 40 TABLET, DELAYED RELEASE ORAL at 07:38

## 2023-01-01 RX ADMIN — AMINOPHYLLINE 50 MG: 25 INJECTION, SOLUTION INTRAVENOUS at 10:59

## 2023-01-01 RX ADMIN — FUROSEMIDE 80 MG: 10 INJECTION, SOLUTION INTRAMUSCULAR; INTRAVENOUS at 17:35

## 2023-01-01 RX ADMIN — ACETAMINOPHEN 650 MG: 325 TABLET, FILM COATED ORAL at 03:33

## 2023-01-01 RX ADMIN — MORPHINE SULFATE 2 MG: 2 INJECTION, SOLUTION INTRAMUSCULAR; INTRAVENOUS at 07:44

## 2023-01-01 RX ADMIN — LOPERAMIDE HYDROCHLORIDE 2 MG: 2 CAPSULE ORAL at 17:13

## 2023-01-01 RX ADMIN — LEFLUNOMIDE 20 MG: 20 TABLET ORAL at 20:12

## 2023-01-01 RX ADMIN — FUROSEMIDE 80 MG: 10 INJECTION, SOLUTION INTRAMUSCULAR; INTRAVENOUS at 05:45

## 2023-01-01 RX ADMIN — CHLORHEXIDINE GLUCONATE 15 ML: 1.2 SOLUTION ORAL at 08:07

## 2023-01-01 RX ADMIN — LOSARTAN POTASSIUM 25 MG: 25 TABLET, FILM COATED ORAL at 16:41

## 2023-01-01 RX ADMIN — FUROSEMIDE 40 MG: 10 INJECTION INTRAMUSCULAR; INTRAVENOUS at 08:54

## 2023-01-01 RX ADMIN — CHLORHEXIDINE GLUCONATE 15 ML: 1.2 SOLUTION ORAL at 08:05

## 2023-01-01 RX ADMIN — LOPERAMIDE HYDROCHLORIDE 4 MG: 2 CAPSULE ORAL at 08:20

## 2023-01-01 RX ADMIN — GABAPENTIN 100 MG: 100 CAPSULE ORAL at 14:42

## 2023-01-01 RX ADMIN — HYDROXYCHLOROQUINE SULFATE 200 MG: 200 TABLET ORAL at 21:19

## 2023-01-01 RX ADMIN — ASPIRIN 81 MG CHEWABLE TABLET 81 MG: 81 TABLET CHEWABLE at 08:24

## 2023-01-01 RX ADMIN — FENTANYL CITRATE 25 MCG: 50 INJECTION, SOLUTION INTRAMUSCULAR; INTRAVENOUS at 03:56

## 2023-01-01 RX ADMIN — ONDANSETRON 4 MG: 4 TABLET, ORALLY DISINTEGRATING ORAL at 04:27

## 2023-01-01 RX ADMIN — LEFLUNOMIDE 20 MG: 20 TABLET ORAL at 21:03

## 2023-01-01 RX ADMIN — PIPERACILLIN AND TAZOBACTAM 4.5 G: 4; .5 INJECTION, POWDER, FOR SOLUTION INTRAVENOUS at 09:35

## 2023-01-01 RX ADMIN — HYDROXYCHLOROQUINE SULFATE 200 MG: 200 TABLET, FILM COATED ORAL at 09:53

## 2023-01-01 RX ADMIN — SODIUM CHLORIDE 81 ML: 9 INJECTION, SOLUTION INTRAVENOUS at 10:22

## 2023-01-01 RX ADMIN — DEXTROSE MONOHYDRATE 25 ML: 25 INJECTION, SOLUTION INTRAVENOUS at 03:57

## 2023-01-01 RX ADMIN — MAGNESIUM SULFATE HEPTAHYDRATE 3 G: 500 INJECTION, SOLUTION INTRAMUSCULAR; INTRAVENOUS at 17:42

## 2023-01-01 RX ADMIN — ROSUVASTATIN CALCIUM 10 MG: 10 TABLET, FILM COATED ORAL at 19:50

## 2023-01-01 RX ADMIN — Medication 2 CAPSULE: at 09:31

## 2023-01-01 RX ADMIN — HYDROXYCHLOROQUINE SULFATE 200 MG: 200 TABLET, FILM COATED ORAL at 07:38

## 2023-01-01 RX ADMIN — LEFLUNOMIDE 20 MG: 20 TABLET ORAL at 20:04

## 2023-01-01 RX ADMIN — CLOPIDOGREL BISULFATE 75 MG: 75 TABLET ORAL at 20:04

## 2023-01-01 RX ADMIN — AMLODIPINE BESYLATE 5 MG: 5 TABLET ORAL at 13:36

## 2023-01-01 RX ADMIN — METOPROLOL TARTRATE 2.5 MG: 5 INJECTION INTRAVENOUS at 13:37

## 2023-01-01 RX ADMIN — SPIRONOLACTONE 12.5 MG: 25 TABLET ORAL at 08:12

## 2023-01-01 RX ADMIN — GABAPENTIN 100 MG: 100 CAPSULE ORAL at 08:12

## 2023-01-01 RX ADMIN — LOSARTAN POTASSIUM 50 MG: 50 TABLET, FILM COATED ORAL at 08:39

## 2023-01-01 RX ADMIN — ROSUVASTATIN CALCIUM 5 MG: 5 TABLET, FILM COATED ORAL at 20:09

## 2023-01-01 RX ADMIN — REGADENOSON 0.4 MG: 0.08 INJECTION, SOLUTION INTRAVENOUS at 10:38

## 2023-01-01 RX ADMIN — Medication 2 CAPSULE: at 08:19

## 2023-01-01 RX ADMIN — SODIUM CHLORIDE: 9 INJECTION, SOLUTION INTRAVENOUS at 10:26

## 2023-01-01 RX ADMIN — POTASSIUM CHLORIDE 10 MEQ: 7.46 INJECTION, SOLUTION INTRAVENOUS at 06:14

## 2023-01-01 RX ADMIN — PANTOPRAZOLE SODIUM 40 MG: 40 INJECTION, POWDER, FOR SOLUTION INTRAVENOUS at 07:38

## 2023-01-01 RX ADMIN — SULFAMETHOXAZOLE AND TRIMETHOPRIM 1 TABLET: 800; 160 TABLET ORAL at 09:53

## 2023-01-01 RX ADMIN — CALCIUM GLUCONATE 2 G: 20 INJECTION, SOLUTION INTRAVENOUS at 10:45

## 2023-01-01 RX ADMIN — POTASSIUM CHLORIDE 10 MEQ: 750 TABLET, EXTENDED RELEASE ORAL at 05:09

## 2023-01-01 RX ADMIN — MAGNESIUM SULFATE HEPTAHYDRATE 2 G: 40 INJECTION, SOLUTION INTRAVENOUS at 12:20

## 2023-01-01 RX ADMIN — HEPARIN SODIUM 5000 UNITS: 5000 INJECTION, SOLUTION INTRAVENOUS; SUBCUTANEOUS at 08:08

## 2023-01-01 RX ADMIN — ROSUVASTATIN CALCIUM 10 MG: 10 TABLET, FILM COATED ORAL at 20:12

## 2023-01-01 RX ADMIN — HYDRALAZINE HYDROCHLORIDE 25 MG: 25 TABLET ORAL at 19:12

## 2023-01-01 RX ADMIN — FUROSEMIDE 40 MG: 10 INJECTION, SOLUTION INTRAMUSCULAR; INTRAVENOUS at 08:56

## 2023-01-01 RX ADMIN — SODIUM CHLORIDE: 9 INJECTION, SOLUTION INTRAVENOUS at 00:15

## 2023-01-01 RX ADMIN — POTASSIUM CHLORIDE 10 MEQ: 7.46 INJECTION, SOLUTION INTRAVENOUS at 05:07

## 2023-01-01 RX ADMIN — PANTOPRAZOLE SODIUM 40 MG: 40 TABLET, DELAYED RELEASE ORAL at 08:35

## 2023-01-01 RX ADMIN — TETROFOSMIN 9.4 MILLICURIE: 1.38 INJECTION, POWDER, LYOPHILIZED, FOR SOLUTION INTRAVENOUS at 07:30

## 2023-01-01 RX ADMIN — HEPARIN SODIUM 5000 UNITS: 5000 INJECTION, SOLUTION INTRAVENOUS; SUBCUTANEOUS at 01:28

## 2023-01-01 RX ADMIN — NITROGLYCERIN 10 MCG/MIN: 20 INJECTION INTRAVENOUS at 08:57

## 2023-01-01 RX ADMIN — Medication 40 MG: at 08:05

## 2023-01-01 RX ADMIN — ASPIRIN 325 MG ORAL TABLET 325 MG: 325 PILL ORAL at 09:02

## 2023-01-01 RX ADMIN — ACETAMINOPHEN 650 MG: 325 TABLET, FILM COATED ORAL at 10:04

## 2023-01-01 RX ADMIN — FUROSEMIDE 20 MG: 20 TABLET ORAL at 08:12

## 2023-01-01 RX ADMIN — METOPROLOL TARTRATE 25 MG: 25 TABLET, FILM COATED ORAL at 08:08

## 2023-01-01 RX ADMIN — SODIUM CHLORIDE 60 ML: 9 INJECTION, SOLUTION INTRAVENOUS at 20:42

## 2023-01-01 RX ADMIN — Medication 2 CAPSULE: at 08:07

## 2023-01-01 RX ADMIN — Medication 2 CAPSULE: at 09:52

## 2023-01-01 RX ADMIN — GABAPENTIN 100 MG: 100 CAPSULE ORAL at 08:07

## 2023-01-01 RX ADMIN — ASPIRIN 81 MG: 81 TABLET, COATED ORAL at 08:08

## 2023-01-01 RX ADMIN — CEFTRIAXONE SODIUM 2 G: 2 INJECTION, POWDER, FOR SOLUTION INTRAMUSCULAR; INTRAVENOUS at 16:15

## 2023-01-01 RX ADMIN — GABAPENTIN 100 MG: 100 CAPSULE ORAL at 21:03

## 2023-01-01 RX ADMIN — SPIRONOLACTONE 12.5 MG: 25 TABLET ORAL at 08:08

## 2023-01-01 RX ADMIN — HYDRALAZINE HYDROCHLORIDE 5 MG: 20 INJECTION INTRAMUSCULAR; INTRAVENOUS at 07:26

## 2023-01-01 RX ADMIN — METOPROLOL TARTRATE 25 MG: 25 TABLET, FILM COATED ORAL at 20:54

## 2023-01-01 RX ADMIN — MIDAZOLAM HYDROCHLORIDE 1 MG/HR: 1 INJECTION, SOLUTION INTRAVENOUS at 00:53

## 2023-01-01 RX ADMIN — HEPARIN SODIUM 5000 UNITS: 5000 INJECTION, SOLUTION INTRAVENOUS; SUBCUTANEOUS at 13:37

## 2023-01-01 RX ADMIN — PANTOPRAZOLE SODIUM 40 MG: 40 TABLET, DELAYED RELEASE ORAL at 08:08

## 2023-01-01 RX ADMIN — FUROSEMIDE 20 MG: 20 TABLET ORAL at 08:07

## 2023-01-01 RX ADMIN — PANTOPRAZOLE SODIUM 40 MG: 40 TABLET, DELAYED RELEASE ORAL at 06:33

## 2023-01-01 RX ADMIN — ACETAMINOPHEN 650 MG: 325 TABLET, FILM COATED ORAL at 13:37

## 2023-01-01 RX ADMIN — HYDROXYCHLOROQUINE SULFATE 200 MG: 200 TABLET ORAL at 20:04

## 2023-01-01 RX ADMIN — HEPARIN SODIUM 5000 UNITS: 5000 INJECTION, SOLUTION INTRAVENOUS; SUBCUTANEOUS at 01:12

## 2023-01-01 RX ADMIN — LOPERAMIDE HYDROCHLORIDE 4 MG: 2 CAPSULE ORAL at 07:38

## 2023-01-01 RX ADMIN — HEPARIN SODIUM 5000 UNITS: 5000 INJECTION, SOLUTION INTRAVENOUS; SUBCUTANEOUS at 12:57

## 2023-01-01 RX ADMIN — METOPROLOL TARTRATE 25 MG: 25 TABLET, FILM COATED ORAL at 09:03

## 2023-01-01 RX ADMIN — POTASSIUM CHLORIDE 40 MEQ: 1500 TABLET, EXTENDED RELEASE ORAL at 11:36

## 2023-01-01 RX ADMIN — GABAPENTIN 100 MG: 100 CAPSULE ORAL at 09:53

## 2023-01-01 RX ADMIN — ENOXAPARIN SODIUM 30 MG: 30 INJECTION SUBCUTANEOUS at 17:41

## 2023-01-01 RX ADMIN — METOPROLOL TARTRATE 5 MG: 5 INJECTION INTRAVENOUS at 21:35

## 2023-01-01 RX ADMIN — HYDROXYCHLOROQUINE SULFATE 200 MG: 200 TABLET ORAL at 21:03

## 2023-01-01 RX ADMIN — LOSARTAN POTASSIUM 50 MG: 50 TABLET, FILM COATED ORAL at 09:31

## 2023-01-01 RX ADMIN — POTASSIUM CHLORIDE 20 MEQ: 1.5 POWDER, FOR SOLUTION ORAL at 15:45

## 2023-01-01 RX ADMIN — ASPIRIN 81 MG CHEWABLE TABLET 81 MG: 81 TABLET CHEWABLE at 08:44

## 2023-01-01 RX ADMIN — SPIRONOLACTONE 12.5 MG: 25 TABLET ORAL at 08:32

## 2023-01-01 RX ADMIN — METOPROLOL TARTRATE 25 MG: 25 TABLET, FILM COATED ORAL at 01:27

## 2023-01-01 RX ADMIN — METOPROLOL TARTRATE 25 MG: 25 TABLET, FILM COATED ORAL at 08:05

## 2023-01-01 RX ADMIN — LOSARTAN POTASSIUM 25 MG: 25 TABLET, FILM COATED ORAL at 10:18

## 2023-01-01 RX ADMIN — MORPHINE SULFATE 1 MG: 2 INJECTION, SOLUTION INTRAMUSCULAR; INTRAVENOUS at 05:42

## 2023-01-01 RX ADMIN — GABAPENTIN 100 MG: 100 CAPSULE ORAL at 20:47

## 2023-01-01 RX ADMIN — IOPAMIDOL 53 ML: 755 INJECTION, SOLUTION INTRAVENOUS at 10:22

## 2023-01-01 RX ADMIN — PANTOPRAZOLE SODIUM 40 MG: 40 TABLET, DELAYED RELEASE ORAL at 08:01

## 2023-01-01 RX ADMIN — CARVEDILOL 6.25 MG: 6.25 TABLET, FILM COATED ORAL at 12:04

## 2023-01-01 RX ADMIN — CHLORHEXIDINE GLUCONATE 15 ML: 1.2 SOLUTION ORAL at 20:02

## 2023-01-01 RX ADMIN — FUROSEMIDE 80 MG: 10 INJECTION, SOLUTION INTRAMUSCULAR; INTRAVENOUS at 04:30

## 2023-01-01 RX ADMIN — Medication 2 CAPSULE: at 08:14

## 2023-01-01 RX ADMIN — HYDROXYCHLOROQUINE SULFATE 200 MG: 200 TABLET, FILM COATED ORAL at 08:14

## 2023-01-01 RX ADMIN — HEPARIN SODIUM 5000 UNITS: 5000 INJECTION, SOLUTION INTRAVENOUS; SUBCUTANEOUS at 01:50

## 2023-01-01 RX ADMIN — FUROSEMIDE 20 MG: 20 TABLET ORAL at 08:33

## 2023-01-01 RX ADMIN — AMINOPHYLLINE 50 MG: 25 INJECTION, SOLUTION INTRAVENOUS at 10:51

## 2023-01-01 RX ADMIN — PROPOFOL 35 MCG/KG/MIN: 10 INJECTION, EMULSION INTRAVENOUS at 03:12

## 2023-01-01 RX ADMIN — ONDANSETRON 4 MG: 2 INJECTION INTRAMUSCULAR; INTRAVENOUS at 21:02

## 2023-01-01 RX ADMIN — VANCOMYCIN HYDROCHLORIDE 1000 MG: 1 INJECTION, SOLUTION INTRAVENOUS at 19:41

## 2023-01-01 RX ADMIN — HYDROXYCHLOROQUINE SULFATE 200 MG: 200 TABLET ORAL at 20:12

## 2023-01-01 RX ADMIN — Medication 25 MCG: at 02:56

## 2023-01-01 RX ADMIN — PROPOFOL 25 MCG/KG/MIN: 10 INJECTION, EMULSION INTRAVENOUS at 20:00

## 2023-01-01 RX ADMIN — LOSARTAN POTASSIUM 25 MG: 25 TABLET, FILM COATED ORAL at 08:56

## 2023-01-01 RX ADMIN — POTASSIUM CHLORIDE 10 MEQ: 7.46 INJECTION, SOLUTION INTRAVENOUS at 05:24

## 2023-01-01 RX ADMIN — CARVEDILOL 25 MG: 25 TABLET, FILM COATED ORAL at 17:05

## 2023-01-01 RX ADMIN — CLOPIDOGREL BISULFATE 75 MG: 75 TABLET ORAL at 21:03

## 2023-01-01 RX ADMIN — METOPROLOL TARTRATE 25 MG: 25 TABLET, FILM COATED ORAL at 08:12

## 2023-01-01 RX ADMIN — ROSUVASTATIN CALCIUM 10 MG: 10 TABLET, FILM COATED ORAL at 20:54

## 2023-01-01 RX ADMIN — POTASSIUM CHLORIDE 10 MEQ: 750 TABLET, EXTENDED RELEASE ORAL at 08:32

## 2023-01-01 RX ADMIN — FENTANYL CITRATE 50 MCG: 50 INJECTION, SOLUTION INTRAMUSCULAR; INTRAVENOUS at 00:06

## 2023-01-01 RX ADMIN — SULFAMETHOXAZOLE AND TRIMETHOPRIM 1 TABLET: 800; 160 TABLET ORAL at 20:03

## 2023-01-01 RX ADMIN — ACETAMINOPHEN 650 MG: 325 TABLET, FILM COATED ORAL at 08:12

## 2023-01-01 RX ADMIN — Medication 2 CAPSULE: at 08:01

## 2023-01-01 RX ADMIN — GABAPENTIN 100 MG: 100 CAPSULE ORAL at 19:50

## 2023-01-01 RX ADMIN — ROSUVASTATIN CALCIUM 5 MG: 5 TABLET, FILM COATED ORAL at 21:19

## 2023-01-01 RX ADMIN — SPIRONOLACTONE 12.5 MG: 25 TABLET ORAL at 09:31

## 2023-01-01 RX ADMIN — CARVEDILOL 6.25 MG: 6.25 TABLET, FILM COATED ORAL at 17:41

## 2023-01-01 RX ADMIN — PANTOPRAZOLE SODIUM 40 MG: 40 INJECTION, POWDER, FOR SOLUTION INTRAVENOUS at 08:07

## 2023-01-01 RX ADMIN — FENTANYL CITRATE 25 MCG: 50 INJECTION, SOLUTION INTRAMUSCULAR; INTRAVENOUS at 20:18

## 2023-01-01 RX ADMIN — ONDANSETRON 4 MG: 4 TABLET, ORALLY DISINTEGRATING ORAL at 08:55

## 2023-01-01 RX ADMIN — SPIRONOLACTONE 12.5 MG: 25 TABLET ORAL at 14:42

## 2023-01-01 RX ADMIN — TETROFOSMIN 24.4 MILLICURIE: 1.38 INJECTION, POWDER, LYOPHILIZED, FOR SOLUTION INTRAVENOUS at 10:35

## 2023-01-01 RX ADMIN — MORPHINE SULFATE 5 MG: 100 SOLUTION ORAL at 18:01

## 2023-01-01 RX ADMIN — GABAPENTIN 100 MG: 100 CAPSULE ORAL at 20:54

## 2023-01-01 RX ADMIN — FUROSEMIDE 20 MG: 20 TABLET ORAL at 08:08

## 2023-01-01 RX ADMIN — CLOPIDOGREL BISULFATE 75 MG: 75 TABLET ORAL at 21:19

## 2023-01-01 RX ADMIN — ACETAMINOPHEN 650 MG: 325 TABLET, FILM COATED ORAL at 06:25

## 2023-01-01 RX ADMIN — HYDRALAZINE HYDROCHLORIDE 25 MG: 25 TABLET ORAL at 12:44

## 2023-01-01 RX ADMIN — HUMAN ALBUMIN MICROSPHERES AND PERFLUTREN 9 ML: 10; .22 INJECTION, SOLUTION INTRAVENOUS at 08:57

## 2023-01-01 RX ADMIN — Medication 2 CAPSULE: at 08:33

## 2023-01-01 RX ADMIN — FUROSEMIDE 20 MG: 20 TABLET ORAL at 09:02

## 2023-01-01 RX ADMIN — SPIRONOLACTONE 12.5 MG: 25 TABLET ORAL at 08:39

## 2023-01-01 RX ADMIN — CARVEDILOL 12.5 MG: 6.25 TABLET, FILM COATED ORAL at 10:32

## 2023-01-01 RX ADMIN — POTASSIUM CHLORIDE 20 MEQ: 1.5 POWDER, FOR SOLUTION ORAL at 22:31

## 2023-01-01 ASSESSMENT — ACTIVITIES OF DAILY LIVING (ADL)
ADLS_ACUITY_SCORE: 50
ADLS_ACUITY_SCORE: 39
ADLS_ACUITY_SCORE: 45
ADLS_ACUITY_SCORE: 35
ADLS_ACUITY_SCORE: 57
ADLS_ACUITY_SCORE: 47
ADLS_ACUITY_SCORE: 47
ADLS_ACUITY_SCORE: 50
ADLS_ACUITY_SCORE: 53
ADLS_ACUITY_SCORE: 49
ADLS_ACUITY_SCORE: 45
ADLS_ACUITY_SCORE: 55
ADLS_ACUITY_SCORE: 49
ADLS_ACUITY_SCORE: 53
ADLS_ACUITY_SCORE: 51
ADLS_ACUITY_SCORE: 52
ADLS_ACUITY_SCORE: 57
ADLS_ACUITY_SCORE: 45
ADLS_ACUITY_SCORE: 47
ADLS_ACUITY_SCORE: 47
ADLS_ACUITY_SCORE: 55
ADLS_ACUITY_SCORE: 35
ADLS_ACUITY_SCORE: 51
ADLS_ACUITY_SCORE: 35
ADLS_ACUITY_SCORE: 51
ADLS_ACUITY_SCORE: 49
ADLS_ACUITY_SCORE: 51
ADLS_ACUITY_SCORE: 45
ADLS_ACUITY_SCORE: 52
ADLS_ACUITY_SCORE: 57
ADLS_ACUITY_SCORE: 57
ADLS_ACUITY_SCORE: 48
ADLS_ACUITY_SCORE: 48
ADLS_ACUITY_SCORE: 49
ADLS_ACUITY_SCORE: 53
ADLS_ACUITY_SCORE: 48
ADLS_ACUITY_SCORE: 49
ADLS_ACUITY_SCORE: 51
ADLS_ACUITY_SCORE: 47
ADLS_ACUITY_SCORE: 47
ADLS_ACUITY_SCORE: 53
ADLS_ACUITY_SCORE: 51
ADLS_ACUITY_SCORE: 41
ADLS_ACUITY_SCORE: 45
ADLS_ACUITY_SCORE: 55
ADLS_ACUITY_SCORE: 49
ADLS_ACUITY_SCORE: 53
ADLS_ACUITY_SCORE: 57
ADLS_ACUITY_SCORE: 49
ADLS_ACUITY_SCORE: 47
ADLS_ACUITY_SCORE: 55
ADLS_ACUITY_SCORE: 35
ADLS_ACUITY_SCORE: 47
ADLS_ACUITY_SCORE: 44
ADLS_ACUITY_SCORE: 45
ADLS_ACUITY_SCORE: 51
ADLS_ACUITY_SCORE: 49
ADLS_ACUITY_SCORE: 49
ADLS_ACUITY_SCORE: 50
ADLS_ACUITY_SCORE: 53
ADLS_ACUITY_SCORE: 53
ADLS_ACUITY_SCORE: 51
ADLS_ACUITY_SCORE: 44
ADLS_ACUITY_SCORE: 49
ADLS_ACUITY_SCORE: 45
ADLS_ACUITY_SCORE: 49
ADLS_ACUITY_SCORE: 49
ADLS_ACUITY_SCORE: 56
ADLS_ACUITY_SCORE: 55
ADLS_ACUITY_SCORE: 44
DEPENDENT_IADLS:: COOKING;CLEANING;LAUNDRY;SHOPPING;MEAL PREPARATION;MEDICATION MANAGEMENT;TRANSPORTATION
ADLS_ACUITY_SCORE: 50
ADLS_ACUITY_SCORE: 45
ADLS_ACUITY_SCORE: 49
ADLS_ACUITY_SCORE: 51
ADLS_ACUITY_SCORE: 48
ADLS_ACUITY_SCORE: 47
ADLS_ACUITY_SCORE: 45
ADLS_ACUITY_SCORE: 55
ADLS_ACUITY_SCORE: 45
ADLS_ACUITY_SCORE: 57
ADLS_ACUITY_SCORE: 35
ADLS_ACUITY_SCORE: 44
ADLS_ACUITY_SCORE: 50
ADLS_ACUITY_SCORE: 46
ADLS_ACUITY_SCORE: 35
ADLS_ACUITY_SCORE: 49
ADLS_ACUITY_SCORE: 53
ADLS_ACUITY_SCORE: 50
ADLS_ACUITY_SCORE: 49
ADLS_ACUITY_SCORE: 37
ADLS_ACUITY_SCORE: 51
ADLS_ACUITY_SCORE: 45
ADLS_ACUITY_SCORE: 46
ADLS_ACUITY_SCORE: 45
ADLS_ACUITY_SCORE: 53
ADLS_ACUITY_SCORE: 57
ADLS_ACUITY_SCORE: 53
ADLS_ACUITY_SCORE: 49
ADLS_ACUITY_SCORE: 53
ADLS_ACUITY_SCORE: 50
ADLS_ACUITY_SCORE: 45
ADLS_ACUITY_SCORE: 49
ADLS_ACUITY_SCORE: 47
ADLS_ACUITY_SCORE: 55
ADLS_ACUITY_SCORE: 50
ADLS_ACUITY_SCORE: 51
ADLS_ACUITY_SCORE: 51
ADLS_ACUITY_SCORE: 47
ADLS_ACUITY_SCORE: 46
ADLS_ACUITY_SCORE: 55
ADLS_ACUITY_SCORE: 51
ADLS_ACUITY_SCORE: 44
ADLS_ACUITY_SCORE: 53
ADLS_ACUITY_SCORE: 51
ADLS_ACUITY_SCORE: 57
ADLS_ACUITY_SCORE: 51
ADLS_ACUITY_SCORE: 51
ADLS_ACUITY_SCORE: 35
ADLS_ACUITY_SCORE: 45
ADLS_ACUITY_SCORE: 51
ADLS_ACUITY_SCORE: 49
ADLS_ACUITY_SCORE: 56
ADLS_ACUITY_SCORE: 55
ADLS_ACUITY_SCORE: 47
ADLS_ACUITY_SCORE: 57
ADLS_ACUITY_SCORE: 51
ADLS_ACUITY_SCORE: 49
ADLS_ACUITY_SCORE: 57
ADLS_ACUITY_SCORE: 51
ADLS_ACUITY_SCORE: 50
ADLS_ACUITY_SCORE: 53
ADLS_ACUITY_SCORE: 35
ADLS_ACUITY_SCORE: 47
ADLS_ACUITY_SCORE: 47
ADLS_ACUITY_SCORE: 55
ADLS_ACUITY_SCORE: 45
ADLS_ACUITY_SCORE: 49
ADLS_ACUITY_SCORE: 57
ADLS_ACUITY_SCORE: 44
ADLS_ACUITY_SCORE: 57
ADLS_ACUITY_SCORE: 57
ADLS_ACUITY_SCORE: 48
ADLS_ACUITY_SCORE: 35
ADLS_ACUITY_SCORE: 49
DEPENDENT_IADLS:: COOKING;CLEANING;LAUNDRY;SHOPPING;MEAL PREPARATION;MEDICATION MANAGEMENT;TRANSPORTATION
ADLS_ACUITY_SCORE: 57
ADLS_ACUITY_SCORE: 57
ADLS_ACUITY_SCORE: 49
ADLS_ACUITY_SCORE: 57
ADLS_ACUITY_SCORE: 47
ADLS_ACUITY_SCORE: 35
ADLS_ACUITY_SCORE: 51
ADLS_ACUITY_SCORE: 44
ADLS_ACUITY_SCORE: 35
ADLS_ACUITY_SCORE: 55
ADLS_ACUITY_SCORE: 49
ADLS_ACUITY_SCORE: 55
ADLS_ACUITY_SCORE: 49
ADLS_ACUITY_SCORE: 57
ADLS_ACUITY_SCORE: 57
ADLS_ACUITY_SCORE: 41
ADLS_ACUITY_SCORE: 44
ADLS_ACUITY_SCORE: 55
ADLS_ACUITY_SCORE: 52
ADLS_ACUITY_SCORE: 35
ADLS_ACUITY_SCORE: 51
ADLS_ACUITY_SCORE: 45
ADLS_ACUITY_SCORE: 57
ADLS_ACUITY_SCORE: 50
ADLS_ACUITY_SCORE: 55
ADLS_ACUITY_SCORE: 56
ADLS_ACUITY_SCORE: 45
ADLS_ACUITY_SCORE: 51
ADLS_ACUITY_SCORE: 57
ADLS_ACUITY_SCORE: 50
ADLS_ACUITY_SCORE: 44
ADLS_ACUITY_SCORE: 48
ADLS_ACUITY_SCORE: 50
ADLS_ACUITY_SCORE: 47
ADLS_ACUITY_SCORE: 44
ADLS_ACUITY_SCORE: 44
ADLS_ACUITY_SCORE: 50
ADLS_ACUITY_SCORE: 51
ADLS_ACUITY_SCORE: 50
ADLS_ACUITY_SCORE: 50
ADLS_ACUITY_SCORE: 39
ADLS_ACUITY_SCORE: 50
ADLS_ACUITY_SCORE: 49
ADLS_ACUITY_SCORE: 55
ADLS_ACUITY_SCORE: 47
ADLS_ACUITY_SCORE: 47
ADLS_ACUITY_SCORE: 43
ADLS_ACUITY_SCORE: 43
ADLS_ACUITY_SCORE: 35
ADLS_ACUITY_SCORE: 51
ADLS_ACUITY_SCORE: 53
ADLS_ACUITY_SCORE: 35
ADLS_ACUITY_SCORE: 55
ADLS_ACUITY_SCORE: 51
ADLS_ACUITY_SCORE: 49
ADLS_ACUITY_SCORE: 55
ADLS_ACUITY_SCORE: 49
ADLS_ACUITY_SCORE: 44
ADLS_ACUITY_SCORE: 35
ADLS_ACUITY_SCORE: 57
ADLS_ACUITY_SCORE: 47
ADLS_ACUITY_SCORE: 47
ADLS_ACUITY_SCORE: 57
ADLS_ACUITY_SCORE: 43
ADLS_ACUITY_SCORE: 44
ADLS_ACUITY_SCORE: 35
ADLS_ACUITY_SCORE: 44
ADLS_ACUITY_SCORE: 50

## 2023-04-04 NOTE — ED TRIAGE NOTES
Arrives from [facility]. They currently have Norovirus spreading there- For a couple days, pt has been having N/V, decreased appetite, generalized weakness. A/O x4. Son is POA (Yosi Goldsmith) and is the one who encouraged that pt come to hospital.

## 2023-04-04 NOTE — ED PROVIDER NOTES
History     Chief Complaint:  Nausea & Vomiting       HPI   Nazia Goldsmith is a 82 year old female who presents with nausea and vomiting.  She does live in an assisted living facility in which they are currently battling norovirus.  She says she is never had diarrhea and has not vomited now for several hours.  The last time she had a think she had the dry heaves.  They were concerned about her being dehydrated as such she was transported here by EMS.  She denies any fevers, cough, runny nose, sore throat, chest pain or shortness of breath.  Her son arrives minutes after her arrival from EMS and states that she is just here for hydration      Independent Historian: The patient and her son      ROS:  Review of Systems as in HPI    Allergies:  Reviewed in care everywhere    Medications:    Amlodipine  Aspirin  Plavix  Furosemide  Pending  Hydroxychloroquine  Losartan  Metoprolol  Protonix  Spironolactone      Past Medical History:    CHF  Duodenal ulcer  Esophageal stricture  Hypertension  GERD  DCIS  Depression  Mitral valve stenosis  Osteoarthritis  Neuropathy  Rheumatoid arthritis  Carotid artery stenosis    Past Surgical History:    No past surgical history on file.     Family History:    family history is not on file.    Social History:     PCP: Torito Watts     Physical Exam     Patient Vitals for the past 24 hrs:   BP Temp Temp src Pulse Resp SpO2   04/03/23 2352 -- -- -- -- -- 95 %   04/03/23 2351 126/65 -- -- 92 -- 93 %   04/03/23 2300 -- 98  F (36.7  C) Oral -- 16 --   04/03/23 2228 -- -- -- -- -- 94 %   04/03/23 2226 (!) 150/70 -- -- 97 -- --        Physical Exam  Constitutional: Vital signs reviewed as above  General: Alert  HEENT: Dry mucous membranes  Eyes: Pupils are equal, round, and reactive to light.   Neck: Normal range of motion  Cardiovascular: normal rate, Regular rhythm and normal heart sounds.  No MRG  Pulmonary/Chest: Effort normal and breath sounds normal. No respiratory distress.  Patient has no wheezes. Patient has no rales.   Gastrointestinal: Soft. Positive bowel sounds. No MRG.  Musculoskeletal/Extremities: Full ROM.  Endo: No pitting edema  Neurological: Alert, no focal deficits.  Skin: Skin is warm and dry.  Contusion to the left forearm  Psychiatric: Alert      Emergency Department Course     Laboratory:  Labs Ordered and Resulted from Time of ED Arrival to Time of ED Departure   BASIC METABOLIC PANEL - Abnormal       Result Value    Sodium 135 (*)     Potassium 3.6      Chloride 95 (*)     Carbon Dioxide (CO2) 22      Anion Gap 18 (*)     Urea Nitrogen 49.9 (*)     Creatinine 0.83      Calcium 8.6 (*)     Glucose 86      GFR Estimate 70            Emergency Department Course & Assessments:    Interventions:  Medications   0.9% sodium chloride BOLUS (0 mLs Intravenous Stopped 4/3/23 7866)     Followed by   sodium chloride 0.9% infusion (has no administration in time range)   ondansetron (ZOFRAN) injection 4 mg (has no administration in time range)        Social Determinants of Health affecting care:  Lives in assisted living      Disposition:  The patient was discharged to home.     Impression & Plan      Medical Decision Making:  Patient presents with concerns over nausea and vomiting for the past few days.  There has been no diarrhea.  Norovirus is going around her facility.  Concern was for dehydration.  Her BUN is elevated consistent with some mild dehydration.  She received Zofran and a liter of fluids.  She is feeling better.  She is tolerating p.o.  I will discharge her home with Zofran.  Serial abdominal exams here are normal.  Do not feel any imaging is warranted.  She has no fever or other complaints at this time.  Again no diarrhea.  Family was here and agree with the plan in fact they took her back to her facility.  Follow-up with a neck several days as needed encourage frequent small sips of fluids for hydration.      Diagnosis:    ICD-10-CM    1. Nausea and vomiting,  unspecified vomiting type  R11.2            Discharge Medications:  New Prescriptions    ONDANSETRON (ZOFRAN ODT) 4 MG ODT TAB    Take 1 tablet (4 mg) by mouth every 8 hours as needed for nausea          4/3/2023   Jc Nova MD Walters, Brent Aaron, MD  04/04/23 0007

## 2023-07-25 PROBLEM — J96.01 ACUTE RESPIRATORY FAILURE WITH HYPOXIA (H): Status: ACTIVE | Noted: 2023-01-01

## 2023-07-25 PROBLEM — I50.9 ACUTE ON CHRONIC CONGESTIVE HEART FAILURE, UNSPECIFIED HEART FAILURE TYPE (H): Status: ACTIVE | Noted: 2023-01-01

## 2023-07-25 NOTE — PROGRESS NOTES
82-year-old female admitted from the [facility] emergency room after developing acute respiratory distress in her assisted living facility, was found semi-responsive, hypoxic with gurgling respirations.  Was intubated at the scene by EMS.  Transferred to the emergency room.  Chest x-ray showed bilateral hilar infiltrates consistent with acute pulmonary edema.  Oxygenation improved and her ventilator was decreased to 40% oxygen.  Initial blood gas showed acute respiratory acidosis but also metabolic acidosis with elevated lactate.  There was no cardiac arrest or CPR performed.  She was given a dose of 40 mg of Lasix, 1 dose of Zosyn and transferred up to the intensive care unit.    I spent some time at the bedside talking to her 2 adult sons 1 of which is the POA.  Patient's been in declining health, slowly for couple years especially due to her deforming rheumatoid arthritis, also some recent forgetfulness.  Uses a walker.  Is not on oxygen.  Per their report she had a similar episode although not as severe of heart failure requiring hospitalization without intubation.  They were told that no intervention was going to be performed on her heart that could help her.  She was well enough 4 days ago to take an automobile trip to [place] although she needed a lot of assistance.  She is on leflunomide and hydroxychloroquine for deforming rheumatoid arthritis especially in her hands.  She had a cystic synovitis injected by orthopedic surgery recently.  Orthopedic surgery for effusion of the right wrist was determined not to be a good idea considering her frailty.  She is also seen by pulmonary intermittently for pulmonary nodules some of which are have central calcifications others have mild necrosis and the diagnosis is rheumatoid pulmonary nodules.  No recommended follow-up.    Her CT scan besides the groundglass opacities and the previously noted pulmonary nodules also shows bilateral small pleural effusions.   Currently blood pressure is 150/70 heart rate 110.  She does not have a lot of peripheral edema.  Her previous lower extremity wounds from her peripheral vascular disease are healed.  However both her hands and feet are cool.  No mottling.  She does awaken to stimulation.    Assessment: acute hypoxic and hypercapnic respiratory failure.  Needing intubation and  mechanical ventilation.  No airflow obstruction on the ventilator.  Down to 35% oxygen.  Last blood gas shows partial reversal of the acute respiratory acidosis.  I suspect this is cardiogenic pulmonary edema probably due to mitral valve disease possible mitral stenosis.      I did review the medical record and she did have a hospitalization at [facility] in March 2022  with acute with acute respiratory distress, hypoxemia, small pleural effusions however she recovered with BiPAP and diuresis.  Echocardiogram showed no wall motion abnormalities but severe mitral calcifications and . There was  no recommendation for surgical intervention on the mitral valve and she was sent home on Lasix 20 mg and metoprolol.       P:   Will give additional dose of Lasix later this afternoon.  Obtain echocardiogram.  Cardiology consultation.  I do not think this is an infection therefore I will not continue the IV antibiotics.  Sedation with low-dose with propofol.  If her diuresis does not cause hypotension then can restart metoprolol to slow her heart rate down which should improve diastolic filling.    I did discuss with her adult children her resuscitation status.  Her initial CODE STATUS was DNR/DNI however that was reversed and they think her wishes would be if she is going to get better in a couple days and repeat can return to her previous state of function at the assisted living she should be on life support at least for a brief period of time.  If her overall outcome is no better than nursing home or brain injury or prolonged mechanical ventilation this would  not be consistent with her wishes for life support.    35 minutes critical care time including discussions with emergency room physicians.

## 2023-07-25 NOTE — ED NOTES
Bed: ST03  Expected date:   Expected time:   Means of arrival:   Comments:  [facility] - 424 - 82 F resp distress eta 1863

## 2023-07-25 NOTE — ED PROVIDER NOTES
History     Chief Complaint:  Altered Mental Status       The history is provided by the EMS personnel.      Nazia Goldsmith is a 82 year old female with a history of hypertension and CHF who presents via EMS with altered mental status. Patient lives at [facility].  She had episode of vomiting last night.  This morning when staff went to check on her they found her unresponsive with agonal respirations.  On EMS arrival patient was satting 50%.  She was initially bagged and EMS reports that she was moving her extremities though still unresponsive with coarse lung sounds.  Patient was intubated by EMS (received 10 mg etomidate, 100 mg rocuronium, and later 100 mg ketamine for sedation).  6.0 ET tube was reportedly placed without difficulty.  No reported trauma.  Glucose 200.  On arrival patient is intubated, sedated, and unable to provide any history.    Independent Historian:   EMS - They report History as above    Review of External Notes:   N/A    Medications:    Amlodipine  Aspirin  Plavix  Furosemide  Pending  Hydroxychloroquine  Losartan  Metoprolol  Protonix  Spironolactone      Past Medical History:    CHF  Duodenal ulcer  Esophageal stricture  Hypertension  GERD  DCIS  Depression  Mitral valve stenosis  Osteoarthritis  Neuropathy  Rheumatoid arthritis  Carotid artery stenosis      Past Surgical History:    No past surgical history on file.     Physical Exam     Patient Vitals for the past 24 hrs:   BP Temp Temp src Pulse Resp SpO2 Height Weight   07/25/23 1630 97/60 99.7  F (37.6  C) -- 98 12 98 % -- --   07/25/23 1615 (!) 145/81 99.7  F (37.6  C) -- 103 12 96 % -- --   07/25/23 1600 (!) 82/44 99.7  F (37.6  C) Bladder 92 18 96 % -- --   07/25/23 1545 95/51 99.9  F (37.7  C) -- 89 18 96 % -- --   07/25/23 1530 114/66 99.9  F (37.7  C) -- 94 17 97 % -- --   07/25/23 1515 137/83 99.9  F (37.7  C) -- 99 18 97 % -- --   07/25/23 1500 116/63 99.9  F (37.7  C) -- 94 18 98 % -- --   07/25/23 1417  "133/73 100  F (37.8  C) -- 90 18 98 % -- --   07/25/23 1415 133/73 100  F (37.8  C) -- 94 20 98 % -- --   07/25/23 1400 124/69 99.9  F (37.7  C) -- 83 18 99 % -- --   07/25/23 1345 139/70 99.9  F (37.7  C) -- 90 22 98 % -- --   07/25/23 1330 120/72 99.9  F (37.7  C) -- 106 15 98 % -- --   07/25/23 1315 102/72 99.9  F (37.7  C) -- 108 23 99 % -- --   07/25/23 1300 (!) 85/48 99.7  F (37.6  C) -- 103 22 99 % -- --   07/25/23 1245 136/77 99.5  F (37.5  C) -- 106 21 99 % -- --   07/25/23 1230 (!) 177/99 99.5  F (37.5  C) -- 120 18 100 % -- --   07/25/23 1218 (!) 145/86 99.5  F (37.5  C) -- 105 22 97 % -- --   07/25/23 1215 (!) 145/86 99.5  F (37.5  C) Bladder 106 22 98 % -- --   07/25/23 1200 (!) 189/108 -- -- 104 14 99 % -- --   07/25/23 1130 98/59 99.3  F (37.4  C) -- 103 16 100 % -- --   07/25/23 1125 103/60 99.3  F (37.4  C) -- 107 14 100 % -- --   07/25/23 1123 -- 99.3  F (37.4  C) -- -- -- -- -- --   07/25/23 1121 -- -- -- 109 16 100 % -- --   07/25/23 1120 (!) 85/44 -- -- 106 11 100 % -- --   07/25/23 1100 118/67 -- -- 103 16 99 % -- --   07/25/23 1057 (!) 146/93 -- -- 112 16 100 % -- --   07/25/23 1052 (!) 161/97 -- -- 104 16 99 % -- --   07/25/23 1047 (!) 146/86 -- -- 93 16 100 % -- --   07/25/23 1045 (!) 158/92 -- -- 96 16 100 % 1.575 m (5' 2\") 47.9 kg (105 lb 9.6 oz)   07/25/23 1039 -- -- -- 98 16 100 % -- --   07/25/23 1038 (!) 147/93 -- -- 96 -- -- -- --   07/25/23 0945 (!) 163/92 -- -- 93 16 99 % -- --   07/25/23 0935 134/70 -- -- 92 16 98 % -- --   07/25/23 0930 106/63 -- -- 90 16 97 % -- --   07/25/23 0925 91/50 -- -- 93 16 96 % -- --   07/25/23 0920 (!) 83/45 -- -- 92 16 100 % -- --   07/25/23 0915 92/58 -- -- 93 16 100 % -- --   07/25/23 0910 122/66 -- -- 97 21 99 % -- --   07/25/23 0905 133/70 -- -- 92 16 98 % -- --   07/25/23 0900 (!) 162/84 -- -- 97 15 98 % 1.525 m (5' 0.04\") --   07/25/23 0855 (!) 156/79 -- -- 98 16 98 % -- --   07/25/23 0850 (!) 188/97 -- -- 109 16 98 % -- --   07/25/23 0700 -- -- " -- -- -- -- -- 48 kg (105 lb 13.1 oz)        Physical Exam  General: Intubated, sedated, unresponsive.  Head:  Scalp is NC/AT  Eyes:  No scleral icterus, PERRL  ENT:  The external nose and ears are normal.  ET tube present  CV:  Mildly tachycardic rate and regular rhythm  Resp:  On ventilator.  ET tube in place.  Diffuse coarse lung sounds.  GI:  Abdomen is soft, no distension.  MS:  +1 pitting lower extremity edema  Skin:  Warm and cool, No rash or lesions noted.  Neuro: Unresponsive and sedated.         Emergency Department Course   ECG  ECG taken at 0909, ECG read at 0948  Normal sinus rhythm   Nonspecific St and T wave abnormality  Rate 96 bpm. MI interval 114 ms. QRS duration 88 ms. QT/QTc 384/485 ms. P-R-T axes 25 57 124.     Imaging:  CT Chest Pulmonary Embolism w Contrast   Final Result   IMPRESSION:    1. No evidence of pulmonary embolism.   2. Bilateral upper lobe predominant patchy and groundglass pulmonary   opacities, likely infectious.   3. Multiple partially calcified and sometimes centrally necrotic   nodular pulmonary opacities, indeterminate, could be infectious or   neoplastic.   4. Small bilateral pleural effusions and associated basilar   atelectasis/consolidation.      KHANH CHANCE MD            SYSTEM ID:  [id number]      Head CT w/o contrast   Final Result   IMPRESSION:       1.  No CT evidence of acute intracranial abnormality.    2.  Findings suggestive of advanced chronic microvascular ischemic   disease.    3.  Calcified intracranial atherosclerosis.      OLIVE CANALES MD            SYSTEM ID:  [id number]      XR Chest Port 1 View   Final Result   IMPRESSION: AP view of the chest was obtained. Endotracheal tube tip   projects over the low thoracic trachea approximately 3.5 cm from the   lo. Enteric tube crosses the diaphragm and the distal tip projects   over the stomach. Cardiomediastinal silhouette is within normal   limits. Bilateral perihilar predominant pulmonary opacities,    indeterminate, could represent pulmonary edema or infection. No   significant pleural effusion or pneumothorax.      KHANH CHANCE MD            SYSTEM ID:  [id number]         Report per radiology    Laboratory:  Labs Ordered and Resulted from Time of ED Arrival to Time of ED Departure   COMPREHENSIVE METABOLIC PANEL - Abnormal       Result Value    Sodium 133 (*)     Potassium 4.3      Chloride 96 (*)     Carbon Dioxide (CO2) 17 (*)     Anion Gap 20 (*)     Urea Nitrogen 11.0      Creatinine 0.64      Calcium 8.6 (*)     Glucose 204 (*)     Alkaline Phosphatase 88      AST 34      ALT 14      Protein Total 6.5      Albumin 3.6      Bilirubin Total 0.3      GFR Estimate 87     TROPONIN T, HIGH SENSITIVITY - Abnormal    Troponin T, High Sensitivity 49 (*)    ROUTINE UA WITH MICROSCOPIC - Abnormal    Color Urine Light Yellow      Appearance Urine Clear      Glucose Urine 50 (*)     Bilirubin Urine Negative      Ketones Urine Negative      Specific Gravity Urine 1.013      Blood Urine Trace (*)     pH Urine 6.0      Protein Albumin Urine 100 (*)     Urobilinogen Urine Normal      Nitrite Urine Negative      Leukocyte Esterase Urine Negative      Mucus Urine Present (*)     RBC Urine 2      WBC Urine 3      Squamous Epithelials Urine <1      Hyaline Casts Urine 4 (*)    NT PROBNP INPATIENT - Abnormal    N terminal Pro BNP Inpatient 7,042 (*)    BLOOD GAS VENOUS WITH OXYHEMOGLOBIN - Abnormal    pH Venous 7.23 (*)     pCO2 Venous 57 (*)     pO2 Venous 23 (*)     Bicarbonate Venous 24      FIO2 60      Oxyhemoglobin Venous 22 (*)     Base Excess/Deficit (+/-) -4.2     ACETAMINOPHEN LEVEL - Abnormal    Acetaminophen <5.0 (*)    D DIMER QUANTITATIVE - Abnormal    D-Dimer Quantitative 3.15 (*)    CBC WITH PLATELETS AND DIFFERENTIAL - Abnormal    WBC Count 10.6      RBC Count 3.31 (*)     Hemoglobin 10.5 (*)     Hematocrit 33.6 (*)      (*)     MCH 31.7      MCHC 31.3 (*)     RDW 14.1      Platelet Count 255      %  Neutrophils 76      % Lymphocytes 15      % Monocytes 6      % Eosinophils 2      % Basophils 1      % Immature Granulocytes 0      NRBCs per 100 WBC 0      Absolute Neutrophils 8.1      Absolute Lymphocytes 1.6      Absolute Monocytes 0.6      Absolute Eosinophils 0.2      Absolute Basophils 0.1      Absolute Immature Granulocytes 0.0      Absolute NRBCs 0.0     ISTAT GASES LACTATE VENOUS POCT - Abnormal    Lactic Acid POCT 5.4 (*)     Bicarbonate Venous POCT 21      O2 Sat, Venous POCT 94      pCO2 Venous POCT 54 (*)     pH Venous POCT 7.20 (*)     pO2 Venous POCT 86 (*)    ETHYL ALCOHOL LEVEL - Normal    Alcohol ethyl <0.01     SALICYLATE LEVEL - Normal    Salicylate <0.3     TSH WITH FREE T4 REFLEX - Normal    TSH 1.98     DRUG ABUSE SCREEN 77 URINE (FL, RH, SH) - Normal    Amphetamines Urine Screen Negative      Barbituates Urine Screen Negative      Benzodiazepine Urine Screen Negative      Cannabinoids Urine Screen Negative      Opiates Urine Screen Negative      PCP Urine Screen Negative      Cocaine Urine Screen Negative     LACTIC ACID WHOLE BLOOD   BLOOD CULTURE   BLOOD CULTURE          Emergency Department Course & Assessments:  Interventions:  Medications   propofol (DIPRIVAN) infusion (15 mcg/kg/min × 48 kg (Order-Specific) Intravenous Rate/Dose Change 7/25/23 1121)   nitroGLYcerin 50 mg in D5W 250 mL (adult std) infusion CENTRAL (0 mcg/min Intravenous Stopped 7/25/23 0916)   furosemide (LASIX) injection 40 mg (40 mg Intravenous $Given 7/25/23 0856)   piperacillin-tazobactam (ZOSYN) 4.5 g vial to attach to  mL bag (0 g Intravenous Stopped 7/25/23 1005)   iopamidol (ISOVUE-370) solution 53 mL (53 mLs Intravenous $Given 7/25/23 1022)   100mL Saline Flush (81 mLs Intravenous $Given 7/25/23 1022)   hyaluronidase (HYLENEX) 150 unit/mL for extravasation 150 Units (150 Units Subcutaneous $Given 7/25/23 1118)      Independent Interpretation (X-rays, CTs, rhythm strip):  I reviewed the patient's chest  x-ray, bilateral perihilar pulmonary opacities represent edema versus infection.  ET tube in proper position  I reviewed the patient's head CT; no evidence of intracranial bleeding    Assessments/Consultations/Discussion of Management or Tests:  None   ED Course as of 07/25/23 1125   Tue Jul 25, 2023   0875 I obtained history and examined the patient as noted above.     1055 I spoke with Dr. Bingham, pulmonologist who accepts the patient for admission.        Social Determinants of Health affecting care:   None    Disposition:  The patient was admitted to the ICU under the care of Dr. Bingham.     Impression & Plan      Medical Decision Making:  Patient is a 82-year-old female with history of heart failure presents after being found unresponsive in acute respiratory failure.  Patient's medical history and records reviewed.  On evaluation patient was seen in the stabilization room and had been intubated prior to ED arrival by EMS.  Chest x-ray demonstrated proper ET tube position and imaging concerning for pulmonary edema versus infection.  Labs were obtained and notable for mildly elevated troponin (49), mild anemia (hemoglobin 10.5), elevated BNP (7042), elevated dimer (3.15), VBG demonstrating acidosis with elevated lactic acid (5.4; likely secondary to hypoxia versus infection; provided empiric Zosyn after blood cultures obtained).  Burch catheter was inserted and UA is without evidence of infection.  Patient appears volume overloaded and was hypertensive.  Initiated on nitroglycerin infusion and provided 40 mg IV Lasix with significant urine output.  Patient was maintained on propofol for sedation.  OG tube was placed by nursing.  CTA of the chest was obtained and shows no evidence of PE; see complete results above.  Head CT without significant acute findings.  Presentation at this time is most consistent with acute respiratory failure secondary to pulmonary edema and heart failure exacerbation however  infectious process not excluded.  Of note patient did have contrast infiltration into her upper extremity with CTA of the chest and is were provided hyaluronic acid per protocol.    Critical Care time:  was 40 minutes for this patient excluding procedures.    Diagnosis:    ICD-10-CM    1. Acute respiratory failure with hypoxia (H)  J96.01       2. Acute on chronic congestive heart failure, unspecified heart failure type (H)  I50.9              Scribe Disclosure:  Kathie ESPOSITO, am serving as a scribe at 11:20 AM on 7/25/2023 to document services personally performed by Ziggy Franco DO based on my observations and the provider's statements to me.   7/25/2023   Ziggy Franco DO O'Neill, Christopher Warren, DO  07/25/23 1726

## 2023-07-25 NOTE — PLAN OF CARE
Neuro: PERRL, KEYES, follows commands  CV: 2.5 metoprolol given for HR.   Resp: Tolerating vent settings, coarse throughout.   GI/: montoya with adequate UOP, pt diuresed in OR. Active bowel tones.   Skin: Bruising on L side (pre-existing), old would LLE.   Family updated at bedside on POC. Continue to monitor.

## 2023-07-25 NOTE — PHARMACY-ADMISSION MEDICATION HISTORY
Pharmacist Admission Medication History    Admission medication history is complete. The information provided in this note is only as accurate as the sources available at the time of the update.    Medication reconciliation/reorder completed by provider prior to medication history? No    Information Source(s): Facility (U/NH/) medication list/MAR via in-person    Pertinent Information: All medications and allergies were added via nursing home MAR.    Changes made to PTA medication list:  Added: all meds  Deleted: None  Changed: None    Medication Affordability:  Not including over the counter (OTC) medications, was there a time in the past 3 months when you did not take your medications as prescribed because of cost?: Unable to Assess    Allergies reviewed with patient and updates made in EHR: yes    Medication History Completed By: Michelle Hernandez [facility] 7/25/2023 9:04 AM    Prior to Admission medications    Medication Sig Last Dose Taking? Auth Provider Long Term End Date   acetaminophen (TYLENOL) 500 MG tablet Take 1,000 mg by mouth 3 times daily 7/24/2023 at 1854 Yes Unknown, Entered By History     amLODIPine (NORVASC) 2.5 MG tablet Take 2.5 mg by mouth daily 7/24/2023 at 0916 Yes Unknown, Entered By History Yes    aspirin 81 MG EC tablet Take 81 mg by mouth daily 7/24/2023 at 0916 Yes Unknown, Entered By History     clopidogrel (PLAVIX) 75 MG tablet Take 75 mg by mouth every evening 7/24/2023 at 1845 Yes Unknown, Entered By History Yes    gabapentin (NEURONTIN) 100 MG capsule Take 100 mg by mouth 2 times daily 7/24/2023 at 1845 Yes Unknown, Entered By History Yes    hydroxychloroquine (PLAQUENIL) 200 MG tablet Take 200 mg by mouth every evening 7/24/2023 at 1845 Yes Unknown, Entered By History     ibuprofen (ADVIL/MOTRIN) 600 MG tablet Take 600 mg by mouth every 6 hours as needed for moderate pain 11/6/2022 Yes Unknown, Entered By History     leflunomide (ARAVA) 20 MG tablet Take 20 mg by mouth every  evening 7/24/2023 at 1845 Yes Unknown, Entered By History Yes    lidocaine HCl 3 % cream Apply to right shoulder area of pain twice daily as needed 12/28/22 at 0905 Yes Unknown, Entered By History Yes    loperamide (IMODIUM) 2 MG capsule Take 4 mg by mouth daily before breakfast 7/24/2023 at 0800 Yes Unknown, Entered By History     losartan (COZAAR) 100 MG tablet Take 100 mg by mouth daily 7/24/2023 at 0916 Yes Unknown, Entered By History Yes    metoprolol succinate ER (TOPROL XL) 50 MG 24 hr tablet Take 50 mg by mouth every evening 7/24/2023 at 1845 Yes Unknown, Entered By History Yes    multivitamin, therapeutic (THERA-VIT) TABS tablet Take 1 tablet by mouth daily 7/24/2023 at 0916 Yes Unknown, Entered By History     ondansetron (ZOFRAN ODT) 4 MG ODT tab Take 4 mg by mouth every 8 hours as needed for nausea 04/05/23 at 1840 Yes Unknown, Entered By History     pantoprazole (PROTONIX) 40 MG EC tablet Take 40 mg by mouth daily 7/24/2023 at 0800 Yes Unknown, Entered By History     rosuvastatin (CRESTOR) 5 MG tablet Take 5 mg by mouth every evening 7/24/2023 at 1854 Yes Unknown, Entered By History Yes    sodium chloride 1 GM tablet Take 1 g by mouth 2 times daily 7/24/2023 at 1854 Yes Unknown, Entered By History     solifenacin (VESICARE) 5 MG tablet Take 5 mg by mouth every evening 7/24/2023 at 1854 Yes Unknown, Entered By History     spironolactone (ALDACTONE) 25 MG tablet Take 12.5 mg by mouth daily 7/24/2023 at 0900 Yes Unknown, Entered By History Yes    vitamin D3 (CHOLECALCIFEROL) 50 mcg (2000 units) tablet Take 1 tablet by mouth daily 7/24/2023 at 0916 Yes Unknown, Entered By History     zoledronic Acid (RECLAST) 5 MG/100ML SOLN infusion Inject 5 mg into the vein once Every year  Yes Unknown, Entered By History Yes

## 2023-07-25 NOTE — H&P
Critical Care  H &  P       07/25/2023    Name: Nazia Goldsmith MRN#: [id number]   Age: 82 year old YOB: 1940     [facility] Day[facility] 0  ICU DAY # 1    MV DAY # 1           Problem List:   Acute pulmonary edema          Summary/Hospital Course:     82F hx of RA, hx of calcified mitral valve, hx of diastolic heart failure, hx of breast cancer, GERD, PAD who presented with acte respiratory failure. Found down and unresponsive at her assisted living facility. Was intubated by EMS.     Notably, patient with previous evaluation at [facility] (03/30/[facility]) where she declined intervention for both her heart failure and calcified mitral valve. She expressed she is DNR/DNI at that time. Discussion with her family who expressed that they would want their mother full code and to continue all efforts       Assessment and plan :     Nazia Goldsmith IS a 82 year old female admitted on 7/25/2023 for altered mental status requiring intubation and mechanical ventilator support.   I have personally reviewed the daily labs, imaging studies, cultures and discussed the case with referring physician and consulting physicians.   My assessment and plan by system for this patient is as follows:    Neurology/Psychiatry:   1. Sedation  2. Analgesia  Plan  - Wean sedation as tolerated  - Multi-modal analgesia     Cardiovascular:   1.Hemodynamics - hemodynamically within normal limits without vasopressor support  2.Rhythm - sinus tachycardia  3. Hx of CHF - unknown prior echo; BNP elevated (>7K);   Plan  - MAP >65  - PRN hydralazine for SBP control (<180)  - Echo to assess baseline function  - Suspect will diurese relatively soon given her hypertension in setting of previous known history of CHF  - Will hold PTA metoprolol at this time  - Can consider cardiology consult if refractory to diuresis therapy or evidence of cardiogenic shock    Pulmonary/Ventilator Management:   1. Airway: intubated  2.  Oxygenation/ventilation/mechanics  Plan  - Goal SpO2>92%  - Wean as tolerated  - Hypercarbic respiratory failure -> RR increased and will reassess    GI and Nutrition :   1. NPO  Plan  - OG to LIS  - Dietary advancement pending extubation    Renal/Fluids/Electrolytes:   1. Montoya  Plan  - discontinue montoya  - monitor function and electrolytes as needed with replacement per ICU protocols  - generally avoid nephrotoxic agents such as NSAID, IV contrast unless specifically required  - adjust medications as needed for renal clearance  - follow I/O's as appropriate.    Infectious Disease:   1. Afebrile: low suspicion infectious process  Plan  - will discontinue Abx    Endocrine:   1. Hyperglycemia on CMP likely stress induced  Plan  - ICU insulin protocol, goal sugar <180    Hematology/Oncology:   1. Macrocytic anemia  Plan  - multi-vitamin    Musculoskeltal:   1. Bedrest  Plan  - bedrest  - activity advancement pending extubation     IV/Access:   1. Venous access - PIV  2. Arterial access - n/a  Plan  - no central access necessary at this time    ICU Prophylaxis:   1. DVT: Hep Subq/mechanical  2. VAP: HOB 30 degrees, chlorhexidine rinse  3. Stress Ulcer: PPI  4. Restraints: Nonviolent soft two point restraints required and necessary for patient safety and continued cares and good effect as patient continues to pull at necessary lines, tubes despite education and distraction. Will readdress daily.   5. Wound care  - daily re-evaluation  6. Feeding - pending extubation  7. Family Update: sons at bedside  8. Disposition - ICU    Key goals for next 24 hours:     1. Diuresis for CHF exacerbation  2. Dietary/activity advancement pending extubation         Medical History:     No past medical history on file.  No past surgical history on file.  Social History     Socioeconomic History    Marital status:      Spouse name: Not on file    Number of children: Not on file    Years of education: Not on file    Highest  education level: Not on file   Occupational History    Not on file   Tobacco Use    Smoking status: Not on file    Smokeless tobacco: Not on file   Substance and Sexual Activity    Alcohol use: Not on file    Drug use: Not on file    Sexual activity: Not on file   Other Topics Concern    Not on file   Social History Narrative    Not on file     Social Determinants of Health     Financial Resource Strain: Not on file   Food Insecurity: Not on file   Transportation Needs: Not on file   Physical Activity: Not on file   Stress: Not on file   Social Connections: Not on file   Intimate Partner Violence: Not on file   Housing Stability: Not on file        Allergies   Allergen Reactions    Methotrexate Unknown    Sulindac Unknown    Codeine Dizziness    Dyazide [Hydrochlorothiazide W-Triamterene] Nausea and Vomiting    Hydrocodone-Acetaminophen Nausea and Vomiting    Omeprazole Rash    Oxycodone Other (See Comments) and Dizziness     constipation    Ranitidine Rash          Key Medications:      nitroGLYcerin Stopped (07/25/23 0916)    propofol 15 mcg/kg/min (07/25/23 0921)      Home Meds  No current facility-administered medications on file prior to encounter.  acetaminophen (TYLENOL) 500 MG tablet, Take 1,000 mg by mouth 3 times daily  amLODIPine (NORVASC) 2.5 MG tablet, Take 2.5 mg by mouth daily  aspirin 81 MG EC tablet, Take 81 mg by mouth daily  clopidogrel (PLAVIX) 75 MG tablet, Take 75 mg by mouth every evening  gabapentin (NEURONTIN) 100 MG capsule, Take 100 mg by mouth 2 times daily  hydroxychloroquine (PLAQUENIL) 200 MG tablet, Take 200 mg by mouth every evening  ibuprofen (ADVIL/MOTRIN) 600 MG tablet, Take 600 mg by mouth every 6 hours as needed for moderate pain  leflunomide (ARAVA) 20 MG tablet, Take 20 mg by mouth every evening  lidocaine HCl 3 % cream, Apply to right shoulder area of pain twice daily as needed  loperamide (IMODIUM) 2 MG capsule, Take 4 mg by mouth daily before breakfast  losartan (COZAAR)  100 MG tablet, Take 100 mg by mouth daily  metoprolol succinate ER (TOPROL XL) 50 MG 24 hr tablet, Take 50 mg by mouth every evening  multivitamin, therapeutic (THERA-VIT) TABS tablet, Take 1 tablet by mouth daily  ondansetron (ZOFRAN ODT) 4 MG ODT tab, Take 4 mg by mouth every 8 hours as needed for nausea  pantoprazole (PROTONIX) 40 MG EC tablet, Take 40 mg by mouth daily  rosuvastatin (CRESTOR) 5 MG tablet, Take 5 mg by mouth every evening  sodium chloride 1 GM tablet, Take 1 g by mouth 2 times daily  solifenacin (VESICARE) 5 MG tablet, Take 5 mg by mouth every evening  spironolactone (ALDACTONE) 25 MG tablet, Take 12.5 mg by mouth daily  vitamin D3 (CHOLECALCIFEROL) 50 mcg (2000 units) tablet, Take 1 tablet by mouth daily  zoledronic Acid (RECLAST) 5 MG/100ML SOLN infusion, Inject 5 mg into the vein once Every year           Physical Examination:      No intake or output data in the 24 hours ending 07/25/23 1027  Wt Readings from Last 4 Encounters:   07/25/23 48 kg (105 lb 13.1 oz)        Vent Mode: CMV/AC  (Continuous Mandatory Ventilation/ Assist Control)  Resp Rate (Set): 16 breaths/min  Tidal Volume (Set, mL): 420 mL  PEEP (cm H2O): 5 cmH2O    Recent Labs   Lab 07/25/23  0912   O2PER 60     GEN: no acute distress   HEENT: head ncat, sclera anicteric, trachea midline   PULM: non-labored respirations on ventilator  CV/COR: sinus tachycardia  ABD: soft nontender, no mass  EXT:  warm  NEURO: sedated  SKIN: no obvious rash  LINES: clean, dry intact         Data:   All data and imaging reviewed     ROUTINE ICU LABS (Last four results)  CMP  Recent Labs   Lab 07/25/23  0847 07/19/23  0725   * 138   POTASSIUM 4.3 4.3   CHLORIDE 96* 100   CO2 17* 24   ANIONGAP 20* 14   * 86   BUN 11.0 8.6   CR 0.64 0.61   GFRESTIMATED 87 88   JAMES 8.6* 9.5   PROTTOTAL 6.5 6.9   ALBUMIN 3.6 3.9   BILITOTAL 0.3 0.3   ALKPHOS 88 74   AST 34 29   ALT 14 9     CBC  Recent Labs   Lab 07/25/23  0846 07/19/23  0725   WBC 10.6 8.8    RBC 3.31* 3.30*   HGB 10.5* 10.4*   HCT 33.6* 34.0*   * 103*   MCH 31.7 31.5   MCHC 31.3* 30.6*   RDW 14.1 14.7    207     INRNo lab results found in last 7 days.  Arterial Blood Gas  Recent Labs   Lab 07/25/23  0912   O2PER 60     All cultures:  No results for input(s): CULT in the last 168 hours.  Recent Results (from the past 24 hour(s))   XR Chest Port 1 View    Narrative    CHEST ONE VIEW PORTABLE   7/25/2023 8:52 AM     HISTORY:  Post EMS intubation, respiratory distress.    COMPARISON: None available.      Impression    IMPRESSION: AP view of the chest was obtained. Endotracheal tube tip  projects over the low thoracic trachea approximately 3.5 cm from the  lo. Enteric tube crosses the diaphragm and the distal tip projects  over the stomach. Cardiomediastinal silhouette is within normal  limits. Bilateral perihilar predominant pulmonary opacities,  indeterminate, could represent pulmonary edema or infection. No  significant pleural effusion or pneumothorax.    KHANH CHANCE MD         SYSTEM ID:  [id number]     Patient seen and discussed with Dr. Quang Muniz MD  Surgical Critical Care Fellow    Billing: This patient is critically ill: Yes. Total critical care time today 38 min.

## 2023-07-25 NOTE — ED TRIAGE NOTES
Pt coming from the [facility] via ems - staff found pt uncon. Labored breathing SpO2 50% crackles in upper lobes   Pt was intubated per EMS

## 2023-07-26 NOTE — PROGRESS NOTES
Patient was extubated to 3L oxymask. Lung sounds clear, no stridor noted.   Vitals: HR: 91, RR:20, /86, SpO2: 97%  Will continue to follow.    Urvashi Loving, RT student

## 2023-07-26 NOTE — CONSULTS
[facility]    Cardiology Consultation     Date of Admission:  7/25/2023    Review of the result(s) of each unique test - Last ECG echocardiogram CBC BMP.     Assessment & Plan   Nazia Goldsmith is a 82 year old female who was admitted on 7/25/2023.    Acute exacerbation of diastolic heart failure  Acute hypoxic respiratory failure reintubation secondary to #1  Moderate mitral regurgitation and mitral stenosis on echocardiogram in 2022  Pulmonary hypertension-PASP 45 on TTE in 2022  Mild troponin elevation- non ischemic pattern    Recommendation:   1.  Continue to diurese with IV Lasix  2.  Follow-up results of the echocardiogram.  3.  May need right heart catheterization for further work-up of pulmonary hypertension once stable.  4.  We will start vasodilator therapy increase forward flow and decrease afterload.    Critical Care: Total critical care time spent: 45    KEMI Holt, [facility]  Staff Cardiologist  [facility]    History of Present Illness   Nazia Goldsmith is a 82 year old female with hx of diastolic dysfunction, Mitral valve disease, breast cancer, GERD, PAD who was brought from her assisted living facility after being found down and unresponsive.  She had to be intubated in the emergency room for airway protection.  Cardiology has not been consulted because an echocardiogram in 2022 showed mitral valve disease (moderate MR and mild MS) which now is thought to be contributing to her presentation. Initial, peripheral VBG showed pO2 of 24, pCO2 of 57 and mixed venous of 22. CT and CXR concerning for edema vs infection. She was given IV lasix with good urine output. Since admission she is 4 l -ve. Plan is to extubated tomorrow since she appears to be doing well and o2 requirements remain low. Tte is pending.     Past Medical History   Mitral stenosis    Past Surgical History   No past surgical history on file.    Prior to Admission Medications   Prior to Admission  Medications   Prescriptions Last Dose Informant Patient Reported? Taking?   acetaminophen (TYLENOL) 500 MG tablet 7/24/2023 at 1854  Yes Yes   Sig: Take 1,000 mg by mouth 3 times daily   amLODIPine (NORVASC) 2.5 MG tablet 7/24/2023 at 0916  Yes Yes   Sig: Take 2.5 mg by mouth daily   aspirin 81 MG EC tablet 7/24/2023 at 0916  Yes Yes   Sig: Take 81 mg by mouth daily   clopidogrel (PLAVIX) 75 MG tablet 7/24/2023 at 1845  Yes Yes   Sig: Take 75 mg by mouth every evening   gabapentin (NEURONTIN) 100 MG capsule 7/24/2023 at 1845  Yes Yes   Sig: Take 100 mg by mouth 2 times daily   hydroxychloroquine (PLAQUENIL) 200 MG tablet 7/24/2023 at 1845  Yes Yes   Sig: Take 200 mg by mouth every evening   ibuprofen (ADVIL/MOTRIN) 600 MG tablet 11/6/2022  Yes Yes   Sig: Take 600 mg by mouth every 6 hours as needed for moderate pain   leflunomide (ARAVA) 20 MG tablet 7/24/2023 at 1845  Yes Yes   Sig: Take 20 mg by mouth every evening   lidocaine HCl 3 % cream 12/28/22 at 0905  Yes Yes   Sig: Apply to right shoulder area of pain twice daily as needed   loperamide (IMODIUM) 2 MG capsule 7/24/2023 at 0800  Yes Yes   Sig: Take 4 mg by mouth daily before breakfast   losartan (COZAAR) 100 MG tablet 7/24/2023 at 0916  Yes Yes   Sig: Take 100 mg by mouth daily   metoprolol succinate ER (TOPROL XL) 50 MG 24 hr tablet 7/24/2023 at 1845  Yes Yes   Sig: Take 50 mg by mouth every evening   multivitamin, therapeutic (THERA-VIT) TABS tablet 7/24/2023 at 0916  Yes Yes   Sig: Take 1 tablet by mouth daily   ondansetron (ZOFRAN ODT) 4 MG ODT tab 04/05/23 at 1840  Yes Yes   Sig: Take 4 mg by mouth every 8 hours as needed for nausea   pantoprazole (PROTONIX) 40 MG EC tablet 7/24/2023 at 0800  Yes Yes   Sig: Take 40 mg by mouth daily   rosuvastatin (CRESTOR) 5 MG tablet 7/24/2023 at 1854  Yes Yes   Sig: Take 5 mg by mouth every evening   sodium chloride 1 GM tablet 7/24/2023 at 1854  Yes Yes   Sig: Take 1 g by mouth 2 times daily   solifenacin (VESICARE)  5 MG tablet 7/24/2023 at 1854  Yes Yes   Sig: Take 5 mg by mouth every evening   spironolactone (ALDACTONE) 25 MG tablet 7/24/2023 at 0900  Yes Yes   Sig: Take 12.5 mg by mouth daily   vitamin D3 (CHOLECALCIFEROL) 50 mcg (2000 units) tablet 7/24/2023 at 0916  Yes Yes   Sig: Take 1 tablet by mouth daily   zoledronic Acid (RECLAST) 5 MG/100ML SOLN infusion   Yes Yes   Sig: Inject 5 mg into the vein once Every year      Facility-Administered Medications: None     Current Facility-Administered Medications   Medication Dose Route Frequency    chlorhexidine  15 mL Mouth/Throat Q12H    heparin ANTICOAGULANT  5,000 Units Subcutaneous Q12H    metoprolol tartrate  25 mg Per Feeding Tube BID    pantoprazole  40 mg Per Feeding Tube QAM AC    Or    pantoprazole  40 mg Intravenous QAM AC    rosuvastatin  5 mg Oral QPM     Current Facility-Administered Medications   Medication Last Rate    propofol 35 mcg/kg/min (07/26/23 0312)    And    - MEDICATION INSTRUCTIONS -       Allergies   Allergies   Allergen Reactions    Methotrexate Unknown    Sulindac Unknown    Codeine Dizziness    Dyazide [Hydrochlorothiazide W-Triamterene] Nausea and Vomiting    Hydrocodone-Acetaminophen Nausea and Vomiting    Omeprazole Rash    Oxycodone Other (See Comments) and Dizziness     constipation    Ranitidine Rash       Social History        Family History   No FH of MS, MR       Review of Systems   A comprehensive review of system was performed and is negative other than that noted in the HPI or here.     Physical Exam   Vital Signs with Ranges  Temp:  [99  F (37.2  C)-100  F (37.8  C)] 99.5  F (37.5  C)  Pulse:  [] 80  Resp:  [10-23] 18  BP: ()/() 169/84  FiO2 (%):  [30 %-60 %] 30 %  SpO2:  [96 %-100 %] 98 %  Wt Readings from Last 4 Encounters:   07/25/23 45.1 kg (99 lb 6.8 oz)     I/O last 3 completed shifts:  In: 267.37 [I.V.:87.37; NG/GT:80; IV Piggyback:100]  Out: 2705 [Urine:2605; Emesis/NG output:100]      Vitals: BP (!) 169/84  "  Pulse 80   Temp 99.5  F (37.5  C)   Resp 18   Ht 1.575 m (5' 2\")   Wt 45.1 kg (99 lb 6.8 oz)   SpO2 98%   BMI 18.19 kg/m      Physical Exam:   General - Alert and in no acute distress  Respiratory - mechanical breath sounds  Cardiovascular - systolic murmur.s2 s2 normal  Gastrointestinal - Non distended  Psych - Appropriate affect   No edema.     No lab results found in last 7 days.    Invalid input(s): TROPONINIES    Recent Labs   Lab 07/26/23  0352 07/26/23  0324 07/26/23  0019 07/25/23  1857 07/25/23  1525 07/25/23  1323 07/25/23  0847 07/25/23  0846 07/19/23  0725   WBC  --   --   --   --   --   --   --  10.6 8.8   HGB  --   --   --   --   --   --   --  10.5* 10.4*   MCV  --   --   --   --   --   --   --  102* 103*   PLT  --   --   --   --   --   --   --  255 207   NA  --   --  133* 133*  --  131* 133*  --  138   POTASSIUM  --  3.2* 3.9 3.3*  --  4.1 4.3  --  4.3   CHLORIDE  --   --  92* 94*  --  93* 96*  --  100   CO2  --   --  23 23  --  17* 17*  --  24   BUN  --   --  11.4 12.6  --  11.4 11.0  --  8.6   CR  --   --  0.77 0.77  --  0.63 0.64  --  0.61   GFRESTIMATED  --   --  76 76  --  88 87  --  88   ANIONGAP  --   --  18* 16*  --  21* 20*  --  14   JAMES  --   --  8.9 8.8  --  8.8 8.6*  --  9.5   GLC 50*  --  64* 85   < > 104* 204*  --  86   ALBUMIN  --   --   --   --   --   --  3.6  --  3.9   PROTTOTAL  --   --   --   --   --   --  6.5  --  6.9   BILITOTAL  --   --   --   --   --   --  0.3  --  0.3   ALKPHOS  --   --   --   --   --   --  88  --  74   ALT  --   --   --   --   --   --  14  --  9   AST  --   --   --   --   --   --  34  --  29    < > = values in this interval not displayed.     No results for input(s): CHOL, HDL, LDL, TRIG, CHOLHDLRATIO in the last 86935 hours.  Recent Labs   Lab 07/25/23  0846 07/19/23  0725   WBC 10.6 8.8   HGB 10.5* 10.4*   HCT 33.6* 34.0*   * 103*    207     Recent Labs   Lab 07/25/23  1323 07/25/23  0912 07/25/23  0841   PHV 7.41 7.23* 7.20*   PO2V 40 " 23* 86*   PCO2V 44 57* 54*   HCO3V 28 24 21     Recent Labs   Lab 07/25/23  0847   NTBNPI 7,042*     Recent Labs   Lab 07/25/23  0846   DD 3.15*     No results for input(s): SED, CRP in the last 168 hours.  Recent Labs   Lab 07/25/23  0846 07/19/23  0725    207     Recent Labs   Lab 07/25/23  0847 07/19/23  0725   TSH 1.98 1.07     Recent Labs   Lab 07/25/23  0909   COLOR Light Yellow   APPEARANCE Clear   URINEGLC 50*   URINEBILI Negative   URINEKETONE Negative   SG 1.013   UBLD Trace*   URINEPH 6.0   PROTEIN 100*   NITRITE Negative   LEUKEST Negative   RBCU 2   WBCU 3       Imaging:  Recent Results (from the past 48 hour(s))   XR Chest Port 1 View    Narrative    CHEST ONE VIEW PORTABLE   7/25/2023 8:52 AM     HISTORY:  Post EMS intubation, respiratory distress.    COMPARISON: None available.      Impression    IMPRESSION: AP view of the chest was obtained. Endotracheal tube tip  projects over the low thoracic trachea approximately 3.5 cm from the  lo. Enteric tube crosses the diaphragm and the distal tip projects  over the stomach. Cardiomediastinal silhouette is within normal  limits. Bilateral perihilar predominant pulmonary opacities,  indeterminate, could represent pulmonary edema or infection. No  significant pleural effusion or pneumothorax.    KHANH CHANCE MD         SYSTEM ID:  [id number]   Head CT w/o contrast    Narrative    EXAM: CT HEAD W/O CONTRAST  7/25/2023 10:37 AM     HISTORY: unresponsive       COMPARISON: None    TECHNIQUE: Using multidetector thin collimation helical acquisition  technique, axial, coronal and sagittal CT images from the skull base  to the vertex were obtained without intravenous contrast.   (topogram) image(s) also obtained and reviewed.    Radiation dose for this scan was reduced using automated exposure  control, adjustment of the mA and/or kV according to patient size, or  iterative reconstruction technique.    FINDINGS:    No acute intracranial  hemorrhage. No mass effect. Gray-white  differentiation is preserved. Confluent areas of hypoattenuation in  the periventricular and subcortical white matter throughout both  cerebral hemispheres. Generalized cerebral parenchymal volume loss,  commensurate with patient's age. Calcified intracranial  atherosclerosis. Basal cisterns are patent. No hydrocephalus. Normal  positioning and morphology of the cerebellar tonsils.    Atraumatic calvarium. Clear paranasal sinuses, mastoid air cells.  Nonfocal orbits.       Impression    IMPRESSION:     1.  No CT evidence of acute intracranial abnormality.   2.  Findings suggestive of advanced chronic microvascular ischemic  disease.   3.  Calcified intracranial atherosclerosis.    OLIVE CANALES MD         SYSTEM ID:  [id number]   CT Chest Pulmonary Embolism w Contrast    Narrative    CT CHEST PULMONARY EMBOLISM WITH CONTRAST  7/25/2023 10:38 AM    HISTORY: Postintubation respiratory distress. Elevated dimer; Rule out  PE.    TECHNIQUE: Scans obtained from the apices through the diaphragm with  IV contrast. 53mL [id number] IV injected. Radiation dose for this scan  was reduced using automated exposure control, adjustment of the mA  and/or kV according to patient size, or iterative reconstruction  technique. 2D and 3D MIP reconstructions were performed by the CT  technologist    COMPARISON:  Chest x-ray on same day earlier.    FINDINGS:  Chest/mediastinum: No evidence of pulmonary embolism. The endotracheal  tube tip is in the midthoracic trachea. Enteric tube crosses the  diaphragm with the distal tip outside the field of view. No  cardiomegaly or significant pericardial effusion. Moderate  atherosclerotic vascular calcification of the coronary arteries.  Multiple enlarged mediastinal and hilar lymph nodes, indeterminate,  could be reactive or metastatic.    Lungs and pleura: Small bilateral pleural effusions and associated  basilar atelectasis/consolidation. Bilateral upper  lobe predominant  patchy groundglass pulmonary opacities, likely infectious. Multiple  partially calcified and some centrally necrotic nodular opacities,  indeterminate.    Upper abdomen: Limited evaluation of the upper abdomen due to lack of  coverage and timing of contrast.    Bones and soft tissue: Multiple compression deformities of the  visualized thoracic spine, including T6, T9 and T12 vertebra with  approximately 90% vertebral height loss.      Impression    IMPRESSION:   1. No evidence of pulmonary embolism.  2. Bilateral upper lobe predominant patchy and groundglass pulmonary  opacities, likely infectious.  3. Multiple partially calcified and sometimes centrally necrotic  nodular pulmonary opacities, indeterminate, could be infectious or  neoplastic.  4. Small bilateral pleural effusions and associated basilar  atelectasis/consolidation.    KHANH CHANCE MD         SYSTEM ID:  [id number]       Echo:  No results found for this or any previous visit (from the past 4320 hour(s)).      This note was completed in part using dictation via the Dragon voice recognition software. Some word and grammatical errors may occur and must be interpreted in the appropriate clinical context.  If there are any questions pertaining to this issue, please contact me for further clarification.

## 2023-07-26 NOTE — PLAN OF CARE
Neuro: A&Ox4. PERRL. STEPHY.   CV: SR/ST. SBP<180, no PRNs given. Afebrile.   Resp: Extubated to 2L NC. Titrated to 1L to keep SpO2 >92%  GI: 1 small BM, incontinent. BG in 70s. 1 BG for 55, corrected with apple juice. Diet orders placed.  : Good uop. Burch removed at 1745.   Pain: Generalized arthritis pain, prn Tylenol given x1.   Skin: Bruise on L hip.   Plan: Transfer out of ICU?

## 2023-07-26 NOTE — PROGRESS NOTES
Critical Care  H &  P       07/26/2023    Name: Nazia Goldsmith MRN#: [id number]   Age: 82 year old YOB: 1940     [place] [facility] Day# 2               Problem List:   Acute pulmonary edema     Acute respiratory failure          Summary/Hospital Course:     82F hx of RA, hx of calcified mitral valve, hx of diastolic heart failure, hx of breast cancer, GERD, PAD who presented with acte respiratory failure. Found down and unresponsive at her assisted living facility. Was intubated by EMS.     Pulmonary edema on CXR      Assessment and plan :       Neurology/Psychiatry:   1. Sedation--minimal propofol   2. Analgesia  Plan  - Wean for extubation.   - Multi-modal analgesia     Cardiovascular:   Acute pulmonary edema  seconday to mitral valve disease --improved   1.Hemodynamics - no pressors   2.Rhythm - sinus rhythm     Plan  Metoprolol 25 BID  Cardiology to see  No iv lasix  Start 20 mg oral after extubation     Pulmonary/Ventilator Management:   Ventilator day 2   18 x 350 5  30%.   CXR resolved pulm edema   Plan  Decrease RR to 12 then PS trial     GI and Nutrition :   1. NPO  Plan  - OG to gravity  - Dietary advancement pending extubation    Renal/Fluids/Electrolytes:   Stable cr with 2400 net negative       Infectious Disease:   1. Afebrile: low suspicion infectious process  Plan  - off Abx    Endocrine:   1. Hyperglycemia on CMP likely stress induced--improved   Plan  - ICU insulin protocol, goal sugar <180    Hematology/Oncology:   1. Macrocytic anemia-stable   Plan  - multi-vitamin       IV/Access:   1. Venous access - PIV  2. Arterial access - n/a  Plan  - no central access necessary at this time    ICU Prophylaxis:   1. DVT: Hep  2. VAP: HOB 30 degrees, chlorhexidine rinse  3. Stress Ulcer: PPI  4. Restraints: Nonviolent soft two point restraints required and necessary for patient safety and continued cares and good effect as patient continues to pull at necessary lines, tubes despite education and  distraction. Will readdress daily.   6. Feeding - pending extubation  7. Family Update:   8. Disposition - ICU    Key goals for next 24 hours:     Wean for extubation            Key Medications:      calcium gluconate  2 g Intravenous Once    chlorhexidine  15 mL Mouth/Throat Q12H    heparin ANTICOAGULANT  5,000 Units Subcutaneous Q12H    metoprolol tartrate  25 mg Per Feeding Tube BID    pantoprazole  40 mg Per Feeding Tube QAM AC    Or    pantoprazole  40 mg Intravenous QAM AC    rosuvastatin  5 mg Oral QPM      propofol 20 mcg/kg/min (07/26/23 0749)    And    - MEDICATION INSTRUCTIONS -            Physical Examination:   Temp:  [99  F (37.2  C)-100  F (37.8  C)] 99.5  F (37.5  C)  Pulse:  [] 80  Resp:  [10-23] 18  BP: ()/() 88/49  FiO2 (%):  [30 %-60 %] 30 %  SpO2:  [96 %-100 %] 98 %  Wt Readings from Last 4 Encounters:   07/25/23 45.1 kg (99 lb 6.8 oz)     BP - Mean:  [] 62  Vent Mode: CMV/AC  (Continuous Mandatory Ventilation/ Assist Control)  FiO2 (%): 30 %  Resp Rate (Set): 18 breaths/min  Tidal Volume (Set, mL): 350 mL  PEEP (cm H2O): 5 cmH2O  Resp: 18      GEN: no acute distress   HEENT:   ETT ok   PULM: non-labored respirations on ventilator, no wheeze   CV/COR: sinus rhythm   ABD: soft nontender  EXT:  warm, left arm extravasation ok.   NEURO  opens eyes to commands.    SKIN: no obvious rash  LINES: clean, dry intact         Data:   All data and imaging reviewed     ROUTINE ICU LABS (Last four results)  CMP  Recent Labs   Lab 07/26/23  0520 07/26/23  0445 07/26/23  0413 07/26/23  0352 07/26/23  0324 07/26/23  0019 07/25/23  1857 07/25/23  1525 07/25/23  1323 07/25/23  0847   NA  --   --   --   --   --  133* 133*  --  131* 133*   POTASSIUM  --   --   --   --  3.2* 3.9 3.3*  --  4.1 4.3   CHLORIDE  --   --   --   --   --  92* 94*  --  93* 96*   CO2  --   --   --   --   --  23 23  --  17* 17*   ANIONGAP  --   --   --   --   --  18* 16*  --  21* 20*   * 116* 143* 50*  --  64* 85    < > 104* 204*   BUN  --   --   --   --   --  11.4 12.6  --  11.4 11.0   CR  --   --   --   --   --  0.77 0.77  --  0.63 0.64   GFRESTIMATED  --   --   --   --   --  76 76  --  88 87   JAMES  --   --   --   --   --  8.9 8.8  --  8.8 8.6*   MAG  --   --   --   --  2.3 2.6* 2.6*  --  1.6*  --    PHOS  --   --   --   --  3.9  --   --   --   --  5.0*   PROTTOTAL  --   --   --   --   --   --   --   --   --  6.5   ALBUMIN  --   --   --   --   --   --   --   --   --  3.6   BILITOTAL  --   --   --   --   --   --   --   --   --  0.3   ALKPHOS  --   --   --   --   --   --   --   --   --  88   AST  --   --   --   --   --   --   --   --   --  34   ALT  --   --   --   --   --   --   --   --   --  14    < > = values in this interval not displayed.       CBC  Recent Labs   Lab 07/26/23  0736 07/25/23  0846   WBC 10.9 10.6   RBC 3.05* 3.31*   HGB 9.9* 10.5*   HCT 29.4* 33.6*   MCV 96 102*   MCH 32.5 31.7   MCHC 33.7 31.3*   RDW 14.1 14.1    255       INRNo lab results found in last 7 days.  Arterial Blood Gas  Recent Labs   Lab 07/25/23  1323 07/25/23  0912   O2PER 30 60         Billing: This patient is critically ill: Yes. Total critical care time today 35 min.

## 2023-07-26 NOTE — PLAN OF CARE
Goal Outcome Evaluation:       See flowsheet for VS and Medications    Pt was sedated on propofol throughout night, PERRLA, follows commands, moves all extremities. Hypoglycemic event in the 50's. Stabilized after dextrose given. Plan to extubate today.

## 2023-07-26 NOTE — PROGRESS NOTES
[facility] ICU RESPIRATORY NOTE  Date of Admission: 7/25/23  Date of Intubation (most recent): 7/25/23  Reason for Mechanical Ventilation: Resp. distress  Number of Days on Mechanical Ventilation: 2  Met Criteria for Pressure Support Trial: No  Length of Pressure Support Trial:    Reason for Stopping Pressure Support Trial:  Reason for No Pressure Support Trial: Per MD     Significant Events Today: None overnight     ABG Results:   Recent Labs   Lab 07/25/23  1323 07/25/23  0912   O2PER 30 60     Vent Mode: CMV/AC  (Continuous Mandatory Ventilation/ Assist Control)  FiO2 (%): 30 %  Resp Rate (Set): 18 breaths/min  Tidal Volume (Set, mL): 350 mL  PEEP (cm H2O): 5 cmH2O  Resp: 18    MORRO Mendenhall, RRT

## 2023-07-26 NOTE — PROGRESS NOTES
[facility]    Medicine Progress Note - Hospitalist Service    Date of Admission:  7/25/2023    Assessment & Plan   82F hx of RA, hx of calcified mitral valve, hx of diastolic heart failure, hx of breast cancer, GERD, PAD who presented 7/25/23 with acute respiratory failure. Found down and unresponsive at her assisted living facility. Was intubated by EMS.  Initial CXR showed pulmonary edema. She was diuresed nearly 4L since admission and extubated to nasal cannula on 7/26.     Acute pulmonary edema (flash pulmonary edema)  Calcified mitral valve  HFpEF with acute exacerbation, resolving  Severe pulmonary HTN  Acute hypoxic respiratory failure, requiring intubation, resolved  - consult cardiology   - transfer to cardiac floor  - appears euvolemic, hold further diuresis  - continue spironolactone, metoprolol  - resume PTA losartan, amlodipine in next days pending clinical course    Hyperglycemia, resolved  -Suspect related to stress    Macrocytic anemia, stable  -Monitor    RA  - continue leflunomide and hydroxychloroquine     PAD with history of revascularization  -Monitor    SIADH  - Monitor sodium  - Continue PTA sodium tabs    GERD  - PPI       Diet: Advance Diet as Tolerated: Clear Liquid Diet    DVT Prophylaxis: Pneumatic Compression Devices  Burch Catheter: PRESENT, indication: Strict 1-2 Hour I&O  Lines: None     Cardiac Monitoring: ACTIVE order. Indication: ICU  Code Status: Full Code      Clinically Significant Risk Factors        # Hypokalemia: Lowest K = 3.2 mmol/L in last 2 days, will replace as needed   # Hypocalcemia: Lowest iCa = 4.1 mg/dL in last 2 days, will monitor and replace as appropriate   # Hypomagnesemia: Lowest Mg = 1.6 mg/dL in last 2 days, will replace as needed  # Anion Gap Metabolic Acidosis: Highest Anion Gap = 21 mmol/L in last 2 days, will monitor and treat as appropriate                      Disposition Plan     Expected Discharge Date: 07/27/2023                   Justice Cruz MD  Hospitalist Service  [facility]  Securely message with [employer] (more info)  Text page via [email or web address] Paging/Directory   ______________________________________________________________________    Interval History   Seen postextubation.  Patient is feeling better than she did prior day.  She still feels quite fatigued.  She denies any shortness of breath.  She feels weak.    Physical Exam   Vital Signs: Temp: 98.1  F (36.7  C) Temp src: Oral BP: (!) 161/70 Pulse: 92   Resp: 20 SpO2: 96 % O2 Device: Nasal cannula Oxygen Delivery: 1 LPM  Weight: 99 lbs 6.84 oz    Constitutional: Awake, alert, cooperative, no apparent distress  Respiratory: Clear to auscultation bilaterally, no crackles or wheezing  Cardiovascular: Regular rate and rhythm, normal S1 and S2, and no murmur noted  GI: Normal bowel sounds, soft, non-distended, non-tender  Skin/Integumen: No rashes, no cyanosis, no edema  Other:       Medical Decision Making       60 MINUTES SPENT BY ME on the date of service doing chart review, history, exam, documentation & further activities per the note.      Data   ------------------------- PAST 24 HR DATA REVIEWED -----------------------------------------------    I have personally reviewed the following data over the past 24 hrs:    10.9  \   9.9 (L)   / 207     134 (L) 93 (L) 11.1 /  99   3.3 (L) 23 0.72 \     Trop: N/A BNP: 5,191 (H)     Procal: N/A CRP: N/A Lactic Acid: 1.8         Imaging results reviewed over the past 24 hrs:   Recent Results (from the past 24 hour(s))   XR Chest Port 1 View    Narrative    EXAM: XR CHEST PORT 1 VIEW  LOCATION: [facility]  DATE: 7/26/2023    INDICATION: s p intubation; interval eval  COMPARISON: 07/25/2023 radiograph, 07/25/2023 chest CT      Impression    IMPRESSION: Endotracheal tube tip 2.8 cm above the lo. Gastric drainage tube extends to the stomach. Interval resolution of pulmonary edema. Right  lung nodules better evaluated on recent CT. Likely persistent small pleural effusions. No pneumothorax.   Echo Complete   Result Value    LVEF  55-60%    [facility]    [id number]  [id number]  [id number]^JATINDER^BELKIS     [facility]  Echocardiography Laboratory  [address] [place] [address]     Name: WALTER MORENO  MRN: [id number]  : 1940  Study Date: 2023 09:16 AM  Age: 82 yrs  Gender: Female  Patient Location: [facility]  Reason For Study: Other, Please Specify in Comments  Ordering Physician: BELKIS TOBIAS  Referring Physician: BELKIS TOBIAS  Performed By: Shin Gramajo     BSA: 1.5 m2  Height: 64 in  Weight: 105 lb  HR: 77  BP: 181/80 mmHg  ______________________________________________________________________________  Procedure  Complete Echo Adult. Optison (NDC #[id number]) given intravenously.  ______________________________________________________________________________  Interpretation Summary     1. Left ventricular systolic function is normal. The visual ejection fraction  is 55-60%.  2. No regional wall motion abnormalities noted.  3. The right ventricle is normal in structure, function and size.  4. The left atrium is moderately dilated.  5. There is moderate (2+) mitral regurgitation.  6. There is moderate mitral stenosis; mean gradient 7 mmHg at HR 80 bpm.  7. Severe pulmonary hypertension; The right ventricular systolic pressure is  approximated at 56mmHg plus the right atrial pressure. IVC diameter <2.1 cm  collapsing >50% with sniff suggests a normal RA pressure of 3 mmHg.  ______________________________________________________________________________  Left Ventricle  The left ventricle is normal in size. There is normal left ventricular wall  thickness. Left ventricular systolic function is normal. The visual ejection  fraction is 55-60%. Left ventricular diastolic function is normal. No regional  wall motion abnormalities noted.     Right  Ventricle  The right ventricle is normal in structure, function and size.     Atria  The left atrium is moderately dilated. Right atrial size is normal. There is  no color Doppler evidence of an atrial shunt.     Mitral Valve  There is mild mitral annular calcification. There is moderate (2+) mitral  regurgitation. There is moderate mitral stenosis.     Tricuspid Valve  The tricuspid valve is normal in structure and function. There is mild to  moderate (1-2+) tricuspid regurgitation. The right ventricular systolic  pressure is approximated at 56mmHg plus the right atrial pressure.     Aortic Valve  The aortic valve is trileaflet with aortic valve sclerosis.     Pulmonic Valve  The pulmonic valve is not well seen, but is grossly normal.     Vessels  Normal size aorta. IVC diameter <2.1 cm collapsing >50% with sniff suggests a  normal RA pressure of 3 mmHg.     Pericardium  There is no pericardial effusion.     Rhythm  Sinus rhythm was noted.  ______________________________________________________________________________  MMode/2D Measurements & Calculations  IVSd: 1.1 cm     LVIDd: 3.3 cm  LVIDs: 3.0 cm  LVPWd: 1.1 cm  FS: 10.3 %  LV mass(C)d: 110.2 grams  LV mass(C)dI: 74.1 grams/m2  Ao root diam: 3.2 cm  asc Aorta Diam: 3.4 cm  LVOT diam: 1.7 cm  LVOT area: 2.3 cm2  LA Volume (BP): 76.9 ml  LA Volume Index (BP): 51.6 ml/m2  RWT: 0.69  TAPSE: 1.8 cm     Doppler Measurements & Calculations  MV E max erlin: 162.0 cm/sec  MV A max erlin: 162.0 cm/sec  MV E/A: 1.0  MV max P.1 mmHg  MV mean P.0 mmHg  MV V2 VTI: 43.7 cm  MVA(VTI): 1.7 cm2  MV dec slope: 743.0 cm/sec2  MV dec time: 0.22 sec  Ao V2 max: 161.5 cm/sec  Ao max PG: 10.0 mmHg  CECE(V,D): 1.8 cm2  LV V1 max P.9 mmHg  LV V1 max: 131.0 cm/sec  LV V1 VTI: 32.0 cm  SV(LVOT): 72.6 ml  SI(LVOT): 48.8 ml/m2  PA V2 max: 122.0 cm/sec  PA max P.0 mmHg  PA acc time: 0.12 sec  TR max erlin: 374.0 cm/sec  TR max P.0 mmHg  AV Erlin Ratio (DI): 0.81  E/E' avg:  27.4  Lateral E/e': 20.1  Medial E/e': 34.6  RV S Erlin: 14.9 cm/sec     ______________________________________________________________________________  Report approved by: Ara Calderon 07/26/2023 12:05 PM

## 2023-07-27 NOTE — PROGRESS NOTES
[facility]  Cardiology Progress Note    Date of Service (when I saw the patient): 07/27/2023  Summary: Nazia Goldsmith is a 82 year old female with history of  diastolic dysfunction, Mitral valve disease, breast cancer, GERD, PAD  who was admitted on 7/25/2023 with hypoxic respiratory failure secondary to acute HFpEF requiring intubation for airway protection. She was found down and unresponsive at her group home.  Interval History   She is currently on RA. No dyspnea at rest. BPs remain elevated.  Assessment & Plan   Acute on chronic HFpEF. Diuresing with IV lasix.   - Repeat echo shows normal biventricular function, moderate dilated LA, moderate mitral valve disease, and pulmonary hypertension.  Acute hypoxic respiratory failure reintubation secondary to #1. Extubated 7/25.  Moderate mitral regurgitation and mitral stenosis on echocardiogram in 2022. Repeat echo on 7/26 showed moderate MR and moderate MS with a mean gradient of 7 mmHg.   Pulmonary hypertension. PASP 45 on TTE in 2022. PASP 56 mmHg + RAP on echo yesterday.   Mild troponin elevation. Suspect demand ischemia secondary to the above. CT of the chest showed coronary calcification - will consider noninvasive evaluation.   Chronic hyponatremia.     Plan:  Will start oral lasix 40 mg daily.  Increase losartan to 50 mg daily (home dose is 100 mg daily)  Continue metoprolol, spironolactone  Will plan for NM stress test tomorrow to evaluate for ischemia  Will repeat limited echo when euvolemic to reassess pulmonary pressures and can consider RHC as an outpatient if needed.    Tracey Haque PA-C    Physical Exam   Temp: 98.8  F (37.1  C) Temp src: Oral BP: (!) 165/74 Pulse: 95   Resp: 18 SpO2: 94 % O2 Device: Nasal cannula Oxygen Delivery: 1 LPM  Vitals:    07/25/23 0700 07/25/23 1045 07/25/23 2000 07/27/23 0400   Weight: 48 kg (105 lb 13.1 oz) 47.9 kg (105 lb 9.6 oz) 45.1 kg (99 lb 6.8 oz) 45.6 kg (100 lb 8.5 oz)     Vital Signs with  Ranges  Temp:  [97  F (36.1  C)-99.3  F (37.4  C)] 98.8  F (37.1  C)  Pulse:  [] 95  Resp:  [8-27] 18  BP: (140-182)/() 165/74  FiO2 (%):  [30 %] 30 %  SpO2:  [91 %-98 %] 94 %  07/22 0700 - 07/27 0659  In: 803.94 [P.O.:240; I.V.:383.94]  Out: 6105 [Urine:6005]  Net: -5301.06  Constitutional: NAD.   Respiratory: few basilar crackles.  Cardiovascular: RRR, s1s2, +  murmur  GI: soft, BS+  Skin: warm, no rashes  Musculoskeletal: Moving all extremities. No edema.  Neurologic: Alert, oriented x 3  Neuropsychiatric: Normal affect   Data   Results for orders placed or performed during the hospital encounter of 07/25/23 (from the past 24 hour(s))   Glucose by meter   Result Value Ref Range    GLUCOSE BY METER POCT 55 (L) 70 - 99 mg/dL   Glucose by meter   Result Value Ref Range    GLUCOSE BY METER POCT 80 70 - 99 mg/dL   Potassium   Result Value Ref Range    Potassium 3.3 (L) 3.4 - 5.3 mmol/L   Glucose by meter   Result Value Ref Range    GLUCOSE BY METER POCT 99 70 - 99 mg/dL   Glucose by meter   Result Value Ref Range    GLUCOSE BY METER POCT 77 70 - 99 mg/dL   Glucose by meter   Result Value Ref Range    GLUCOSE BY METER POCT 88 70 - 99 mg/dL   Basic metabolic panel   Result Value Ref Range    Sodium 130 (L) 136 - 145 mmol/L    Potassium 4.2 3.4 - 5.3 mmol/L    Chloride 92 (L) 98 - 107 mmol/L    Carbon Dioxide (CO2) 26 22 - 29 mmol/L    Anion Gap 12 7 - 15 mmol/L    Urea Nitrogen 8.2 8.0 - 23.0 mg/dL    Creatinine 0.51 0.51 - 0.95 mg/dL    Calcium 8.7 (L) 8.8 - 10.2 mg/dL    Glucose 97 70 - 99 mg/dL    GFR Estimate >90 >60 mL/min/1.73m2   CBC with platelets   Result Value Ref Range    WBC Count 10.1 4.0 - 11.0 10e3/uL    RBC Count 3.07 (L) 3.80 - 5.20 10e6/uL    Hemoglobin 9.8 (L) 11.7 - 15.7 g/dL    Hematocrit 29.8 (L) 35.0 - 47.0 %    MCV 97 78 - 100 fL    MCH 31.9 26.5 - 33.0 pg    MCHC 32.9 31.5 - 36.5 g/dL    RDW 13.9 10.0 - 15.0 %    Platelet Count 214 150 - 450 10e3/uL   Nt probnp inpatient   Result  Value Ref Range    N terminal Pro BNP Inpatient 5,460 (H) 0 - 1,800 pg/mL   Magnesium   Result Value Ref Range    Magnesium 1.7 1.7 - 2.3 mg/dL   Ionized Calcium   Result Value Ref Range    Calcium Ionized 4.2 (L) 4.4 - 5.2 mg/dL   Potassium   Result Value Ref Range    Potassium 4.2 3.4 - 5.3 mmol/L   Phosphorus   Result Value Ref Range    Phosphorus 2.9 2.5 - 4.5 mg/dL       Medications      aspirin  81 mg Oral Daily    clopidogrel  75 mg Oral QPM    gabapentin  100 mg Oral BID    heparin ANTICOAGULANT  5,000 Units Subcutaneous Q12H    hydroxychloroquine  200 mg Oral QPM    leflunomide  20 mg Oral QPM    losartan  25 mg Oral Daily    metoprolol tartrate  25 mg Per Feeding Tube BID    [START ON 7/28/2023] pantoprazole  40 mg Oral QAM AC    rosuvastatin  5 mg Oral QPM    spironolactone  12.5 mg Oral Daily

## 2023-07-27 NOTE — PROGRESS NOTES
Alert and oriented, incontinent of stool twice with loose stool. Incontinent of urine, using purewick. Good urine output. Bg slightly decreased at 0100-77. Pt drank four oz apple juice. Replaced potassium. Re check at 0630.

## 2023-07-27 NOTE — PROGRESS NOTES
07/27/23 1429   Appointment Info   Signing Clinician's Name / Credentials (PT) Chinyere Durant, PT, DPT   Living Environment   People in Home alone   Current Living Arrangements assisted living  (senior apt)   Home Accessibility no concerns   Transportation Anticipated family or friend will provide   Self-Care   Usual Activity Tolerance moderate   Current Activity Tolerance fair   Equipment Currently Used at Home walker, rolling   Activity/Exercise/Self-Care Comment pt reports mod IND with amb/transfers/mobility with use of her 4WW, pt reports only gets assist with showers and medication and meals, walks down to dining room. Pt reports just finished up with home PT services a few weeks ago, pt reports recently obtained a bed railing   General Information   Onset of Illness/Injury or Date of Surgery 07/25/23   Referring Physician Justice Cruz MD   Patient/Family Therapy Goals Statement (PT) agreeable to TCU prior to d/c home   Pertinent History of Current Problem (include personal factors and/or comorbidities that impact the POC) 82 year old female with history of  diastolic dysfunction, Mitral valve disease, breast cancer, GERD, PAD  who was admitted on 7/25/2023 with hypoxic respiratory failure secondary to acute HFpEF requiring intubation for airway protection. She was found down and unresponsive at her [facility]. Diuresing with IV lasix, extubated on 7/25. Plans for NM stress test tomorrow to evaluate for ischemia. See chart for further details.   Existing Precautions/Restrictions fall   General Observations resting in bed, 2 sons present at bedside, pt on RA, NAD, purwick   Cognition   Affect/Mental Status (Cognition) WFL   Orientation Status (Cognition) oriented x 4   Follows Commands (Cognition) WFL   Cognitive Status Comments able to answer all questions appropriately, is slow with responses at times.   Pain Assessment   Patient Currently in Pain No  (offers no active complaints)   Posture    Posture  Kyphosis;Protracted shoulders;Forward head position   Range of Motion (ROM)   ROM Comment limited hip/trunk ext-very kyphotic, limited hand/foot mobility-RA deformities   Strength (Manual Muscle Testing)   Strength Comments able to lift ext x 4 against gravity, demos generalized weakness with amb/transfers   Bed Mobility   Comment, (Bed Mobility) SBA with increased time and effort with elevated HOB and railing   Transfers   Comment, (Transfers) min A with sit to stand with FWW   Gait/Stairs (Locomotion)   [place] Level (Gait) minimum assist (75% patient effort)   Assistive Device (Gait) walker, front-wheeled   Distance in Feet 10ft   Distance in Feet (Gait) 70ft   Comment, (Gait/Stairs) short step length, decreased foot clearance, kyphotic posture with flexed fwd trunk, very slow pace   Balance   Balance Comments min A with standing balance with FWW   Sensory Examination   Sensory Perception Comments pt denies numbness/tingling or any new sensory changes.   Clinical Impression   Criteria for Skilled Therapeutic Intervention Yes, treatment indicated   PT Diagnosis (PT) impaired gait   Influenced by the following impairments impaired balance, generalized weakness, impaired activity tolerance   Functional limitations due to impairments impaired IND with functional mobility   Clinical Presentation (PT Evaluation Complexity) Stable/Uncomplicated   Clinical Presentation Rationale clinical judgement   Clinical Decision Making (Complexity) low complexity   Planned Therapy Interventions (PT) balance training;bed mobility training;gait training;home exercise program;home program guidelines;risk factor education;progressive activity/exercise;transfer training;strengthening;stair training;patient/family education;postural re-education   Risk & Benefits of therapy have been explained evaluation/treatment results reviewed;care plan/treatment goals reviewed;risks/benefits reviewed;current/potential barriers  reviewed;participants voiced agreement with care plan;participants included;patient   PT Total Evaluation Time   PT Eval, Low Complexity Minutes (79280) 14   Physical Therapy Goals   PT Frequency 4x/week   PT Predicted Duration/Target Date for Goal Attainment 08/03/23   PT Goals Bed Mobility;Gait;Transfers   PT: Bed Mobility Supine to/from sit;Modified independent   PT: Transfers Modified independent;Sit to/from stand;Bed to/from chair;Assistive device   PT: Gait Modified independent;Rolling walker;150 feet   Interventions   Interventions Quick Adds Gait Training;Therapeutic Activity   Therapeutic Activity   Therapeutic Activities: dynamic activities to improve functional performance Minutes (34847) 10   Symptoms Noted During/After Treatment Fatigue   Treatment Detail/Skilled Intervention Discussed with pt and family recs for TCU, pt/family agreeable. cues for safety with sit to stand transfers with FWW, min A required due to balance, chair set up at bedside for pt and pt left in chair at the end of session-needs in reach and alarm on, discussed recs for chair for all meals and walking to BR with nursing staff throughout the day.   Gait Training   Gait Training Minutes (18758) 15   Symptoms Noted During/After Treatment (Gait Training) fatigue   Treatment Detail/Skilled Intervention amb with FWW, pt with decreased B  due to hx of RA and hand deformities, required intermittent assist for navigation with the walker, flexed fwd trunk, shuffling gait, decreased left foot clearance more than right-pt was able to correct with cues intermittently. very slow amb daniel, no gross LOB, SPO2 on RA: 95%, HR: 115 bpm. Pt encouraged to amb halls with nursing staff.   PT Discharge Planning   PT Plan progress transfers and amb, bring 4WW (pt uses 4WW at home)   PT Discharge Recommendation (DC Rec) Transitional Care Facility   PT Rationale for DC Rec Pt below baseline, reports she is fairly IND with all mobility with her 4WW,  currently requires min A for balance for all transfers and ambulation. Pt and family in agreement with TCU recommendation.   PT Brief overview of current status tolerated amb 80ft with min A and FWW in hallway, SPO2 on RA: 95%, HR: 115 bpm.   Total Session Time   Timed Code Treatment Minutes 25   Total Session Time (sum of timed and untimed services) 39

## 2023-07-27 NOTE — PROGRESS NOTES
Transferred to: [facility] [place], [place],  at 1240  Status at time of transfer: A&O x4, receiving Mg replacement.  Belongings: Sent with patient,  Burch removed? (if no, why?): n.a   Chart and medications: Sent with patient.   Family notified: Yosi son notified by phone 10 minutes after transfers, given new room number.

## 2023-07-27 NOTE — PLAN OF CARE
Cognitive/Mentation: A/Ox 4, forgetful  VS: Stable. Tele: SR w/ 1 degree AV.  GI: BS +, + flatus, last BM today. Incontinent.  : Purewick, voiding adequately. Incontinent.  Pulmonary: LS diminished.  Pain: Denies.     IVs: PIV SL  Drains: None  Skin: scattered bruising  Activity: Assist x 1 with GBW.  Diet: Regular with thin liquids. Takes pills whole.     Aggression Stoplight Score: Green  Therapies recs: TCU  Discharge: Pending    End of shift summary: Transferred down from ICU today. SBP<180, PRNs available. Lexiscan ordered for tomorrow.

## 2023-07-27 NOTE — PROGRESS NOTES
[facility]  Hospitalist Progress Note  Dwight Babin MD  07/27/2023    Assessment & Plan   82F hx of RA, hx of calcified mitral valve, hx of diastolic heart failure, hx of breast cancer, GERD, PAD who presented 7/25/23 with acute respiratory failure. Found down and unresponsive at her assisted living facility. Was intubated by EMS.  Initial CXR showed pulmonary edema. She was diuresed nearly 4L since admission and extubated to nasal cannula on 7/26.      Acute respiratory failure secondary to flash pulmonary edema needing intubation, now extubated on 1L  Valvular disease: mod MR and MS   HFpEF with acute exacerbation, resolving  Severe pulmonary HTN  HTN  ASCVD with type II demand ischemia  - cardiology consult noted and noted ongoing diuresis plan currently -5.3L pro BNP 7K >> 5K  - transfer to cardiac floor when bed available  - appears euvolemic, to oral diuretics as per cardiology  - possible RHC on 7/28?  - continue spironolactone, metoprolol  - resume losartan at 25 mg (home dose 100 mg), low dose amlodipine on hold, BP still elevated 160s-170s  - noted demand troponin pattern, plan for outpatient stress testing in the future  - no evidence for pneumonia so antibiotics were stopped    Hypokalemia  Hyponatremia  -- secondary to diuretics  -- K replaced  -- Na 134 > 130      Hyperglycemia, resolved  -Suspect related to stress     Macrocytic anemia, stable  -Monitor     RA  - continue leflunomide and hydroxychloroquine      PAD with history of revascularization  -Monitor     SIADH  - Monitor sodium  - Continue PTA sodium tabs     GERD  - PPI     Underweight  - no intervention done    Diet: Advance Diet as Tolerated: cardiac diet  DVT Prophylaxis: Pneumatic Compression Devices  Burch Catheter: PRESENT, indication: Strict 1-2 Hour I&O  Lines: None     Cardiac Monitoring: ACTIVE order. Indication: ICU  Code Status: Full Code          Clinically Significant Risk Factors          #  "Hypokalemia: Lowest K = 3.2 mmol/L in last 2 days, will replace as needed   # Hypocalcemia: Lowest iCa = 4.1 mg/dL in last 2 days, will monitor and replace as appropriate   # Hypomagnesemia: Lowest Mg = 1.6 mg/dL in last 2 days, will replace as needed  # Anion Gap Metabolic Acidosis: Highest Anion Gap = 21 mmol/L in last 2 days, will monitor and treat as appropriate                    Disposition Plan     Expected Discharge Date: 07/29/2023                    Interval History   -- chart reviewed  -- labs reviewed  -- only on 1 L oxygen    -Data reviewed today: I reviewed all new labs and imaging over the last 24 hours. I personally reviewed no images or EKG's today.    Physical Exam    , Blood pressure (!) 176/80, pulse 90, temperature 98.1  F (36.7  C), temperature source Axillary, resp. rate 15, height 1.575 m (5' 2\"), weight 45.6 kg (100 lb 8.5 oz), SpO2 96 %.  Vitals:    07/25/23 1045 07/25/23 2000 07/27/23 0400   Weight: 47.9 kg (105 lb 9.6 oz) 45.1 kg (99 lb 6.8 oz) 45.6 kg (100 lb 8.5 oz)     Vital Signs with Ranges  Temp:  [97  F (36.1  C)-99.9  F (37.7  C)] 98.1  F (36.7  C)  Pulse:  [] 90  Resp:  [9-31] 15  BP: (140-182)/() 176/80  SpO2:  [95 %-100 %] 96 %  I/O's Last 24 hours  I/O last 3 completed shifts:  In: 479.09 [P.O.:240; I.V.:239.09]  Out: 2875 [Urine:2875]    Constitutional: Awake, alert, cooperative, no apparent distress  Respiratory: Clear to auscultation bilaterally, no crackles or wheezing  Cardiovascular: Regular rate and rhythm, normal S1 and S2, and no murmur noted  GI: Normal bowel sounds, soft, non-distended, non-tender  Skin/Integumen: No rashes, no cyanosis, no edema  Other:      Medications   All medications were reviewed.     heparin ANTICOAGULANT  5,000 Units Subcutaneous Q12H    losartan  25 mg Oral Daily    metoprolol tartrate  25 mg Per Feeding Tube BID    pantoprazole  40 mg Per Feeding Tube QAM AC    Or    pantoprazole  40 mg Intravenous QAM AC    rosuvastatin  5 mg " Oral QPM        Data   Recent Labs   Lab 07/27/23  0545 07/27/23  0435 07/27/23  0058 07/26/23 2020 07/26/23  1919 07/26/23  1650 07/26/23  1311 07/26/23  0758 07/26/23  0736 07/26/23  0324 07/26/23  0019 07/25/23  1323 07/25/23  0847 07/25/23  0846   WBC 10.1  --   --   --   --   --   --   --  10.9  --   --   --   --  10.6   HGB 9.8*  --   --   --   --   --   --   --  9.9*  --   --   --   --  10.5*   MCV 97  --   --   --   --   --   --   --  96  --   --   --   --  102*     --   --   --   --   --   --   --  207  --   --   --   --  255   *  --   --   --   --   --   --   --  134*  --  133*   < > 133*  --    POTASSIUM 4.2  4.2  --   --   --  3.3*  --  3.3*  --  3.2*  3.2*   < > 3.9   < > 4.3  --    CHLORIDE 92*  --   --   --   --   --   --   --  93*  --  92*   < > 96*  --    CO2 26  --   --   --   --   --   --   --  23  --  23   < > 17*  --    BUN 8.2  --   --   --   --   --   --   --  11.1  --  11.4   < > 11.0  --    CR 0.51  --   --   --   --   --   --   --  0.72  --  0.77   < > 0.64  --    ANIONGAP 12  --   --   --   --   --   --   --  18*  --  18*   < > 20*  --    JAMES 8.7*  --   --   --   --   --   --   --  8.7*  --  8.9   < > 8.6*  --    GLC 97 88 77   < >  --    < >  --    < > 80   < > 64*   < > 204*  --    ALBUMIN  --   --   --   --   --   --   --   --   --   --   --   --  3.6  --    PROTTOTAL  --   --   --   --   --   --   --   --   --   --   --   --  6.5  --    BILITOTAL  --   --   --   --   --   --   --   --   --   --   --   --  0.3  --    ALKPHOS  --   --   --   --   --   --   --   --   --   --   --   --  88  --    ALT  --   --   --   --   --   --   --   --   --   --   --   --  14  --    AST  --   --   --   --   --   --   --   --   --   --   --   --  34  --     < > = values in this interval not displayed.       Recent Results (from the past 24 hour(s))   Echo Complete   Result Value    LVEF  55-60%    [facility]    [id number]  [id number]  [id number]  [id number]^[facility]ADPhillips [facility] Institute  [facility]  Echocardiography Laboratory  [address] [place] [address]     Name: WALTER MORENO  MRN: [id number]  : 1940  Study Date: 2023 09:16 AM  Age: 82 yrs  Gender: Female  Patient Location: [facility]  Reason For Study: Other, Please Specify in Comments  Ordering Physician: BELKIS TOBIAS  Referring Physician: BELKIS TOBIAS  Performed By: Shin Gramajo     BSA: 1.5 m2  Height: 64 in  Weight: 105 lb  HR: 77  BP: 181/80 mmHg  ______________________________________________________________________________  Procedure  Complete Echo Adult. Optison (NDC #[id number]) given intravenously.  ______________________________________________________________________________  Interpretation Summary     1. Left ventricular systolic function is normal. The visual ejection fraction  is 55-60%.  2. No regional wall motion abnormalities noted.  3. The right ventricle is normal in structure, function and size.  4. The left atrium is moderately dilated.  5. There is moderate (2+) mitral regurgitation.  6. There is moderate mitral stenosis; mean gradient 7 mmHg at HR 80 bpm.  7. Severe pulmonary hypertension; The right ventricular systolic pressure is  approximated at 56mmHg plus the right atrial pressure. IVC diameter <2.1 cm  collapsing >50% with sniff suggests a normal RA pressure of 3 mmHg.  ______________________________________________________________________________  Left Ventricle  The left ventricle is normal in size. There is normal left ventricular wall  thickness. Left ventricular systolic function is normal. The visual ejection  fraction is 55-60%. Left ventricular diastolic function is normal. No regional  wall motion abnormalities noted.     Right Ventricle  The right ventricle is normal in structure, function and size.     Atria  The left atrium is moderately dilated. Right atrial size is normal. There is  no color Doppler evidence of an atrial shunt.     Mitral Valve  There is mild mitral  annular calcification. There is moderate (2+) mitral  regurgitation. There is moderate mitral stenosis.     Tricuspid Valve  The tricuspid valve is normal in structure and function. There is mild to  moderate (1-2+) tricuspid regurgitation. The right ventricular systolic  pressure is approximated at 56mmHg plus the right atrial pressure.     Aortic Valve  The aortic valve is trileaflet with aortic valve sclerosis.     Pulmonic Valve  The pulmonic valve is not well seen, but is grossly normal.     Vessels  Normal size aorta. IVC diameter <2.1 cm collapsing >50% with sniff suggests a  normal RA pressure of 3 mmHg.     Pericardium  There is no pericardial effusion.     Rhythm  Sinus rhythm was noted.  ______________________________________________________________________________  MMode/2D Measurements & Calculations  IVSd: 1.1 cm     LVIDd: 3.3 cm  LVIDs: 3.0 cm  LVPWd: 1.1 cm  FS: 10.3 %  LV mass(C)d: 110.2 grams  LV mass(C)dI: 74.1 grams/m2  Ao root diam: 3.2 cm  asc Aorta Diam: 3.4 cm  LVOT diam: 1.7 cm  LVOT area: 2.3 cm2  LA Volume (BP): 76.9 ml  LA Volume Index (BP): 51.6 ml/m2  RWT: 0.69  TAPSE: 1.8 cm     Doppler Measurements & Calculations  MV E max erlin: 162.0 cm/sec  MV A max erlin: 162.0 cm/sec  MV E/A: 1.0  MV max P.1 mmHg  MV mean P.0 mmHg  MV V2 VTI: 43.7 cm  MVA(VTI): 1.7 cm2  MV dec slope: 743.0 cm/sec2  MV dec time: 0.22 sec  Ao V2 max: 161.5 cm/sec  Ao max PG: 10.0 mmHg  CECE(V,D): 1.8 cm2  LV V1 max P.9 mmHg  LV V1 max: 131.0 cm/sec  LV V1 VTI: 32.0 cm  SV(LVOT): 72.6 ml  SI(LVOT): 48.8 ml/m2  PA V2 max: 122.0 cm/sec  PA max P.0 mmHg  PA acc time: 0.12 sec  TR max erlin: 374.0 cm/sec  TR max P.0 mmHg  AV Erlin Ratio (DI): 0.81  E/E' av.4  Lateral E/e': 20.1  Medial E/e': 34.6  RV S Erlin: 14.9 cm/sec     ______________________________________________________________________________  Report approved by: Ara Calderon 2023 12:05 PM             Dwight Babin MD  Text Page   (7am to 6pm)

## 2023-07-27 NOTE — PROGRESS NOTES
Assessment and Plan:   Nazia Goldsmith is a 82 year old female who was admitted on 7/25/2023.    Acute exacerbation of diastolic heart failure  Acute hypoxic respiratory failure requiring intubation secondary to #1  Moderate mitral regurgitation and mitral stenosis on echocardiogram itoday. MV gradient not significantly different but PASP is significantly higher- likey due to volume overload.   Pulmonary hypertension- likely post-capillary from diastolic dysfunction and MR  Mild troponin elevation- non ischemic pattern- monitor    Recommendations:   1.  Continue to diurese with IV Lasix for now. Will switch to po tomorrow. Will need to be on a diuretic going forward.   2.  Reviewed CT scan which showed significant coronary calcification. However, presentation not suggestive of coronary event. Will consider non-invasive work up when stable. No arrhythmias noted so far on tele.   3.  Will need right heart catheterization for further work-up of pulmonary hypertension once stable.  4.  Restarted losartan.       KEMI Holt, [facility]  Staff Cardiologist  [facility]        Interval History:     No significant overnight issues. Extubated today. Doing well. -ve 4L          Medications:      heparin ANTICOAGULANT  5,000 Units Subcutaneous Q12H    [START ON 7/27/2023] losartan  25 mg Oral Daily    metoprolol tartrate  25 mg Per Feeding Tube BID    pantoprazole  40 mg Per Feeding Tube QAM AC    Or    pantoprazole  40 mg Intravenous QAM AC    rosuvastatin  5 mg Oral QPM            Physical Exam:   Patient Vitals for the past 24 hrs:   BP Temp Temp src Pulse Resp SpO2   07/26/23 2200 (!) 140/54 -- -- 65 15 --   07/26/23 2100 (!) 179/87 -- -- 77 22 --   07/26/23 2009 (!) 180/68 -- -- 85 16 --   07/26/23 2000 (!) 141/121 98.1  F (36.7  C) Oral 96 (!) 9 --   07/26/23 1900 (!) 150/67 -- -- 87 20 --   07/26/23 1830 (!) 161/70 -- -- 92 20 96 %   07/26/23 1800 (!) 155/67 -- -- 91 18 97 %   07/26/23 1730 (!) 169/156  98.1  F (36.7  C) -- 118 14 95 %   07/26/23 1700 (!) 182/91 97  F (36.1  C) -- 102 17 98 %   07/26/23 1630 (!) 164/106 -- -- 98 27 97 %   07/26/23 1600 (!) 166/77 99.3  F (37.4  C) Oral 80 20 97 %   07/26/23 1530 (!) 159/98 -- -- 90 20 97 %   07/26/23 1500 (!) 160/72 -- -- 84 14 97 %   07/26/23 1430 (!) 167/75 -- -- 86 19 97 %   07/26/23 1400 (!) 161/69 -- -- 81 11 98 %   07/26/23 1330 (!) 150/71 -- -- 89 12 97 %   07/26/23 1300 (!) 172/130 99.7  F (37.6  C) -- 90 12 97 %   07/26/23 1230 (!) 172/152 99.7  F (37.6  C) -- 90 20 99 %   07/26/23 1200 (!) 180/80 99.9  F (37.7  C) Bladder 92 16 99 %   07/26/23 1130 (!) 168/71 99.9  F (37.7  C) -- 79 19 99 %   07/26/23 1100 (!) 165/70 99.9  F (37.7  C) -- 84 17 99 %   07/26/23 1030 (!) 176/75 99.9  F (37.7  C) -- 84 19 99 %   07/26/23 1000 (!) 163/90 99.9  F (37.7  C) -- 83 22 99 %   07/26/23 0945 (!) 157/67 99.9  F (37.7  C) -- 79 16 98 %   07/26/23 0930 (!) 173/74 99.9  F (37.7  C) -- 82 17 97 %   07/26/23 0915 (!) 176/79 99.7  F (37.6  C) -- 81 16 98 %   07/26/23 0900 (!) 181/80 99.5  F (37.5  C) -- 77 (!) 31 100 %   07/26/23 0845 (!) 186/75 99.3  F (37.4  C) -- 81 10 100 %   07/26/23 0830 (!) 187/80 99.1  F (37.3  C) -- 82 17 99 %   07/26/23 0815 (!) 181/86 99  F (37.2  C) -- 94 19 99 %   07/26/23 0800 (!) 188/86 99  F (37.2  C) Bladder 92 21 100 %   07/26/23 0745 (!) 178/92 99  F (37.2  C) -- 95 22 99 %   07/26/23 0730 (!) 184/106 99  F (37.2  C) -- 105 22 99 %   07/26/23 0715 (!) 200/100 99  F (37.2  C) -- 99 22 99 %   07/26/23 0700 (!) 192/91 99  F (37.2  C) -- 98 22 99 %   07/26/23 0517 (!) 88/49 99.5  F (37.5  C) -- 80 18 98 %   07/26/23 0500 92/50 99.5  F (37.5  C) -- 83 19 98 %   07/26/23 0400 (!) 162/60 99.1  F (37.3  C) -- 76 18 97 %   07/26/23 0300 -- 99.1  F (37.3  C) -- 64 18 99 %   07/26/23 0200 -- 99.5  F (37.5  C) -- 80 18 98 %   07/26/23 0100 -- 99.5  F (37.5  C) -- 93 18 98 %   07/26/23 0000 -- 99.1  F (37.3  C) -- 90 18 98 %     Vitals:    07/25/23  0700 07/25/23 1045 07/25/23 2000   Weight: 48 kg (105 lb 13.1 oz) 47.9 kg (105 lb 9.6 oz) 45.1 kg (99 lb 6.8 oz)         Intake/Output Summary (Last 24 hours) at 7/26/2023 2302  Last data filed at 7/26/2023 1800  Gross per 24 hour   Intake 536.57 ml   Output 2250 ml   Net -1713.43 ml       07/21 2300 - 07/26 2259  In: 803.94 [P.O.:240; I.V.:383.94]  Out: 4955 [Urine:4855]  Net: -4151.06    Exam:  General alert oriented x3 no acute distress  Respirations CTAB, mild basal crackles  Cardiovascular S1 s2 normal. Systolic murmur  Abdomen nondistended   Psych appropriate affect         Data:   LABS (Last four results)  CMP  Recent Labs   Lab 07/26/23 2020 07/26/23  1919 07/26/23  1721 07/26/23  1650 07/26/23  1311 07/26/23  1204 07/26/23  0758 07/26/23  0736 07/26/23  0352 07/26/23  0324 07/26/23  0019 07/25/23  1857 07/25/23  1525 07/25/23  1323 07/25/23  0847   NA  --   --   --   --   --   --   --  134*  --   --  133* 133*  --  131* 133*   POTASSIUM  --  3.3*  --   --  3.3*  --   --  3.2*  3.2*  --  3.2* 3.9 3.3*  --  4.1 4.3   CHLORIDE  --   --   --   --   --   --   --  93*  --   --  92* 94*  --  93* 96*   CO2  --   --   --   --   --   --   --  23  --   --  23 23  --  17* 17*   ANIONGAP  --   --   --   --   --   --   --  18*  --   --  18* 16*  --  21* 20*   GLC 99  --  80 55*  --  75   < > 80   < >  --  64* 85   < > 104* 204*   BUN  --   --   --   --   --   --   --  11.1  --   --  11.4 12.6  --  11.4 11.0   CR  --   --   --   --   --   --   --  0.72  --   --  0.77 0.77  --  0.63 0.64   GFRESTIMATED  --   --   --   --   --   --   --  83  --   --  76 76  --  88 87   JAMES  --   --   --   --   --   --   --  8.7*  --   --  8.9 8.8  --  8.8 8.6*   MAG  --   --   --   --   --   --   --   --   --  2.3 2.6* 2.6*  --  1.6*  --    PHOS  --   --   --   --   --   --   --   --   --  3.9  --   --   --   --  5.0*   PROTTOTAL  --   --   --   --   --   --   --   --   --   --   --   --   --   --  6.5   ALBUMIN  --   --   --   --   --    --   --   --   --   --   --   --   --   --  3.6   BILITOTAL  --   --   --   --   --   --   --   --   --   --   --   --   --   --  0.3   ALKPHOS  --   --   --   --   --   --   --   --   --   --   --   --   --   --  88   AST  --   --   --   --   --   --   --   --   --   --   --   --   --   --  34   ALT  --   --   --   --   --   --   --   --   --   --   --   --   --   --  14    < > = values in this interval not displayed.     CBC  Recent Labs   Lab 07/26/23  0736 07/25/23  0846   WBC 10.9 10.6   RBC 3.05* 3.31*   HGB 9.9* 10.5*   HCT 29.4* 33.6*   MCV 96 102*   MCH 32.5 31.7   MCHC 33.7 31.3*   RDW 14.1 14.1    255     INRNo lab results found in last 7 days.  TROPONINS No results found for: TROPI, TROPONIN, TROPR, TROPN                                                                                                         KEMI Joiner, [facility]  Cardiology, [facility] [place]    This note was completed in part using dictation via the Dragon voice recognition software. Although reviewed after completion, some word and grammatical errors may occur and must be interpreted in the appropriate clinical context.  If there are any questions pertaining to this issue, please contact me for further clarification or information.

## 2023-07-28 NOTE — PROGRESS NOTES
Care Management Follow Up    Length of Stay (days): 3    Expected Discharge Date: 07/28/2023     Concerns to be Addressed:       Patient plan of care discussed at interdisciplinary rounds: Yes    Anticipated Discharge Disposition:       Anticipated Discharge Services:    Anticipated Discharge DME:      Patient/family educated on Medicare website which has current facility and service quality ratings:    Education Provided on the Discharge Plan:    Patient/Family in Agreement with the Plan:      Referrals Placed by CM/SW:    Private pay costs discussed: Not applicable    Additional Information:  SW received message from [facility] saying they could take patient this weekend. SW sent message to doctor but did not hear back whether patient would be ready to discharge tomorrow. SW to follow up in the AM.    HANDY May

## 2023-07-28 NOTE — CONSULTS
Care Management Initial Consult    General Information  Assessment completed with: Patient, Family,    Type of CM/SW Visit: Initial Assessment    Primary Care Provider verified and updated as needed:     Readmission within the last 30 days:        Reason for Consult: discharge planning  Advance Care Planning:            Communication Assessment  Patient's communication style: spoken language (English or Bilingual)             Cognitive  Cognitive/Neuro/Behavioral: WDL  Level of Consciousness: alert  Arousal Level: opens eyes spontaneously  Orientation: oriented x 4  Mood/Behavior: calm, cooperative  Best Language: 0 - No aphasia  Speech: clear, spontaneous    Living Environment:   People in home: facility resident     Current living Arrangements: assisted living      Able to return to prior arrangements: other (see comments) (will need TCU prior to return to assisted living)       Family/Social Support:  Care provided by: other (see comments) (staff)  Provides care for:    Marital Status:   Children          Description of Support System: Involved, Supportive    Support Assessment: Adequate social supports, Adequate family and caregiver support    Current Resources:   Patient receiving home care services: Yes  Skilled Home Care Services: Physical Therapy, Occupational Therapy  Community Resources: None  Equipment currently used at home: walker, rolling  Supplies currently used at home: Wound Care Supplies    Employment/Financial:  Employment Status:          Financial Concerns:             Does the patient's insurance plan have a 3 day qualifying hospital stay waiver?  No    Lifestyle & Psychosocial Needs:  Social Determinants of Health     Tobacco Use: Not on file   Alcohol Use: Not on file   Financial Resource Strain: Not on file   Food Insecurity: Not on file   Transportation Needs: Not on file   Physical Activity: Not on file   Stress: Not on file   Social Connections: Not on file   Intimate Partner  Violence: Not on file   Depression: Not on file   Housing Stability: Not on file       Functional Status:  Prior to admission patient needed assistance:              Mental Health Status:          Chemical Dependency Status:                Values/Beliefs:  Spiritual, Cultural Beliefs, Christianity Practices, Values that affect care:                 Additional Information:  CM/UZMA consulted for discharge planning/disposition. SW completed chart review. Patient is a 82 year old female with a hx of RA, hx of calcified mitral valve, hx of diastolic heart failure, hx of breast cancer, GERD, PAD who presented 7/25/23 with acute respiratory failure. Found down and unresponsive at her assisted living facility. Was intubated by EMS.  Initial CXR showed pulmonary edema. She was diuresed nearly 4L since admission and extubated to nasal cannula on 7/26.     SW met with patient at [place] [facility] to introduce self and role and discuss discharge planning. Patient reports she lives at the [facility] [address] in assisted living and receives support with meds, dressing, bathing, meals, and AM/PM cares. Was getting wound care through [facility] but that might have ended prior to hospitalization. Pt's son corroborated pt's living situation.     SW explained recommendation for TCU and patient and son are in agreement with this. Patient has been to [facility] in the past and would like to go there again. But would also be ok with [facility] and [facility]. SW to send referrals.     SW will continue to follow.     HANDY May

## 2023-07-28 NOTE — PROGRESS NOTES
Lexiscan Stress: Pt tolerated well. VSS. Pt denied chest pain or pressure prior to injection. During injection pt was feeling a little nauseous and slight HA.  After injection pt still had a some Nausea and dry heaving.  Aminophylline 100 mg given per order and per . Pt back to baseline.      ~ 1126 Pt transferred back to Rm 255 per w/c. Pt had a large incontinence of stool when attempting to get in the W/C.  NA came to EKG to help RN with the clean up and brought pt back to the room.

## 2023-07-28 NOTE — PROGRESS NOTES
[facility]  Hospitalist Progress Note  Dwight Babin MD  07/28/2023    Assessment & Plan   82F hx of RA, hx of calcified mitral valve, hx of diastolic heart failure, hx of breast cancer, GERD, PAD who presented 7/25/23 with acute respiratory failure. Found down and unresponsive at her assisted living facility. Was intubated by EMS.  Initial CXR showed pulmonary edema. She was diuresed nearly 4L since admission and extubated to nasal cannula on 7/26.      Acute respiratory failure secondary to flash pulmonary edema needing intubation, now extubated on 1L  Valvular disease: mod MR and MS   HFpEF with acute exacerbation, resolving  Severe pulmonary HTN  HTN  ASCVD with type II demand ischemia  - cardiology consult noted and noted ongoing diuresis plan currently -5.3L pro BNP 7K >> 5K >> 4.7K  - transfer to cardiac floor when bed available  - appears euvolemic, to oral diuretics as per cardiology  - weight down from 105 >> 92 lbs; -5L; appears euvolemic  - nuc gxt today  - possible RHC on 7/28 as outpatient  - continue spironolactone, metoprolol  - continue losartan at 50 mg (home dose 100 mg), BP improved control  - noted demand troponin pattern, patient getting NUC GXT today  - no evidence for pneumonia so antibiotics were stopped    Hypokalemia  Hyponatremia  -- secondary to diuretics  -- K replaced  -- Na 134 > 130 > 131     Hyperglycemia, resolved  -Suspect related to stress     Macrocytic anemia, stable  -Monitor     RA  - continue leflunomide and hydroxychloroquine      PAD with history of revascularization  -Monitor     SIADH  - Monitor sodium  - Continue PTA sodium tabs     GERD  - PPI     Underweight  - no intervention done    Diet: Advance Diet as Tolerated: cardiac diet  DVT Prophylaxis: Pneumatic Compression Devices  Burch Catheter: PRESENT, indication: Strict 1-2 Hour I&O; plan for TOV  Lines: None     Cardiac Monitoring: ACTIVE order. I   Code Status: Full Code         "  Clinically Significant Risk Factors          # Hypokalemia: Lowest K = 3.2 mmol/L in last 2 days, will replace as needed   # Hypocalcemia: Lowest iCa = 4.1 mg/dL in last 2 days, will monitor and replace as appropriate   # Hypomagnesemia: Lowest Mg = 1.6 mg/dL in last 2 days, will replace as needed  # Anion Gap Metabolic Acidosis: Highest Anion Gap = 21 mmol/L in last 2 days, will monitor and treat as appropriate                    Disposition Plan   -- consult social work for TCU placement    Expected Discharge Date: 07/29/2023 to TCU                  Interval History   -- chart reviewed  -- labs reviewed  -- weaned off oxygen    -Data reviewed today: I reviewed all new labs and imaging over the last 24 hours. I personally reviewed no images or EKG's today.    Physical Exam    , Blood pressure 138/64, pulse 78, temperature 98.2  F (36.8  C), temperature source Oral, resp. rate 19, height 1.575 m (5' 2\"), weight 42 kg (92 lb 8 oz), SpO2 99 %.  Vitals:    07/25/23 2000 07/27/23 0400 07/28/23 0638   Weight: 45.1 kg (99 lb 6.8 oz) 45.6 kg (100 lb 8.5 oz) 42 kg (92 lb 8 oz)     Vital Signs with Ranges  Temp:  [98.1  F (36.7  C)-98.8  F (37.1  C)] 98.2  F (36.8  C)  Pulse:  [] 78  Resp:  [17-25] 19  BP: (135-167)/(57-80) 138/64  SpO2:  [88 %-99 %] 99 %  I/O's Last 24 hours  I/O last 3 completed shifts:  In: 720 [P.O.:720]  Out: 250 [Urine:250]    Constitutional: Awake, alert, cooperative, no apparent distress  Respiratory: Clear to auscultation bilaterally, no crackles or wheezing  Cardiovascular: Regular rate and rhythm, normal S1 and S2, and no murmur noted  GI: Normal bowel sounds, soft, non-distended, non-tender  Skin/Integumen: No rashes, no cyanosis, no edema  Other:      Medications   All medications were reviewed.     aspirin  81 mg Oral Daily    clopidogrel  75 mg Oral QPM    furosemide  40 mg Oral Daily    gabapentin  100 mg Oral BID    heparin ANTICOAGULANT  5,000 Units Subcutaneous Q12H    " hydroxychloroquine  200 mg Oral QPM    leflunomide  20 mg Oral QPM    losartan  50 mg Oral Daily    metoprolol tartrate  25 mg Per Feeding Tube BID    pantoprazole  40 mg Oral QAM AC    rosuvastatin  5 mg Oral QPM    sodium chloride (PF)  10 mL Intravenous Once    spironolactone  12.5 mg Oral Daily        Data   Recent Labs   Lab 07/28/23  0636 07/27/23  0545 07/27/23  0435 07/26/23 2020 07/26/23  1919 07/26/23  0758 07/26/23  0736 07/25/23  1323 07/25/23  0847   WBC 11.3* 10.1  --   --   --   --  10.9  --   --    HGB 10.0* 9.8*  --   --   --   --  9.9*  --   --    MCV 99 97  --   --   --   --  96  --   --     214  --   --   --   --  207  --   --    * 130*  --   --   --   --  134*   < > 133*   POTASSIUM 3.5 4.2  4.2  --   --  3.3*   < > 3.2*  3.2*   < > 4.3   CHLORIDE 93* 92*  --   --   --   --  93*   < > 96*   CO2 25 26  --   --   --   --  23   < > 17*   BUN 16.4 8.2  --   --   --   --  11.1   < > 11.0   CR 0.86 0.51  --   --   --   --  0.72   < > 0.64   ANIONGAP 13 12  --   --   --   --  18*   < > 20*   JAMES 8.9 8.7*  --   --   --   --  8.7*   < > 8.6*   * 97 88   < >  --    < > 80   < > 204*   ALBUMIN  --   --   --   --   --   --   --   --  3.6   PROTTOTAL  --   --   --   --   --   --   --   --  6.5   BILITOTAL  --   --   --   --   --   --   --   --  0.3   ALKPHOS  --   --   --   --   --   --   --   --  88   ALT  --   --   --   --   --   --   --   --  14   AST  --   --   --   --   --   --   --   --  34    < > = values in this interval not displayed.         No results found for this or any previous visit (from the past 24 hour(s)).      Dwight Babin MD  Text Page  (7am to 6pm)

## 2023-07-28 NOTE — PROGRESS NOTES
[facility]  Cardiology Progress Note    Date of Service (when I saw the patient): 07/28/2023  Summary: Nazia Goldsmith is a 82 year old female with history of  diastolic dysfunction, Mitral valve disease, breast cancer, GERD, PAD  who was admitted on 7/25/2023 with hypoxic respiratory failure secondary to acute HFpEF requiring intubation for airway protection. She was found down and unresponsive at her [facility].  Interval History   Denies chest pain or shortness of breath. Weight 92 lbs today. BPs better. Son is at bedside today.   Assessment & Plan   Acute on chronic HFpEF. Diuresing with IV lasix.   - Repeat echo shows normal biventricular function, moderate dilated LA, moderate mitral valve disease, and pulmonary hypertension.  Acute hypoxic respiratory failure reintubation secondary to #1. Extubated 7/25.  Moderate mitral regurgitation and mitral stenosis on echocardiogram in 2022. Repeat echo on 7/26 showed moderate MR and moderate MS with a mean gradient of 7 mmHg.   Pulmonary hypertension. PASP 45 on TTE in 2022. PASP 56 mmHg + RAP on echo yesterday.   Mild troponin elevation. Suspect demand ischemia secondary to the above. CT of the chest showed coronary calcification - will consider noninvasive evaluation.   Chronic hyponatremia.     Plan:  Will decrease lasix to 20 mg daily.  Continue losartan 50 mg daily, metoprolol 25 mg BID and spironolactone 12.5 mg daily.   NM stress today is abnormal showing a medium sized area of nontransmural infarct in the basal to mid inferolateral, basal anterolateral and apical lateral segments of the LV associated with a medium sized are of a mild degree of gerardo-infarct ischemia. Discussed results with Nazia and her son today. Would recommend coronary angiography with RHC at that time to evaluate her filling and PA pressures. We briefly reviewed the procedure. She is in agreement to proceed. Unfortunately, she ate so unable to do the procedure today.  Discussed possible discharge with outpatient procedure in close follow up. It appears she is awaiting TCU placement so will plan on angiography and RHC on Monday. She and her son are in agreement with this.  Continue aspirin and statin.    Tracey Haque PA-C    Physical Exam   Temp: 98.2  F (36.8  C) Temp src: Oral BP: 138/64 Pulse: 78   Resp: 19 SpO2: 95 % O2 Device: None (Room air) Oxygen Delivery: 2 LPM  Vitals:    07/25/23 0700 07/25/23 1045 07/25/23 2000 07/27/23 0400   Weight: 48 kg (105 lb 13.1 oz) 47.9 kg (105 lb 9.6 oz) 45.1 kg (99 lb 6.8 oz) 45.6 kg (100 lb 8.5 oz)    07/28/23 0638   Weight: 42 kg (92 lb 8 oz)     Vital Signs with Ranges  Temp:  [98.1  F (36.7  C)-98.8  F (37.1  C)] 98.2  F (36.8  C)  Pulse:  [] 78  Resp:  [17-25] 19  BP: (135-167)/(57-80) 138/64  SpO2:  [88 %-98 %] 95 %  07/23 0700 - 07/28 0659  In: 1523.94 [P.O.:960; I.V.:383.94]  Out: 6355 [Urine:6255]  Net: -4831.06  Constitutional: NAD.   Respiratory: clear.  Cardiovascular: RRR, s1s2, +  murmur  GI: soft, BS+  Skin: warm, no rashes  Musculoskeletal: Moving all extremities. No edema.  Neurologic: Alert, oriented x 3  Neuropsychiatric: Normal affect   Data   Results for orders placed or performed during the hospital encounter of 07/25/23 (from the past 24 hour(s))   Basic metabolic panel   Result Value Ref Range    Sodium 131 (L) 136 - 145 mmol/L    Potassium 3.5 3.4 - 5.3 mmol/L    Chloride 93 (L) 98 - 107 mmol/L    Carbon Dioxide (CO2) 25 22 - 29 mmol/L    Anion Gap 13 7 - 15 mmol/L    Urea Nitrogen 16.4 8.0 - 23.0 mg/dL    Creatinine 0.86 0.51 - 0.95 mg/dL    Calcium 8.9 8.8 - 10.2 mg/dL    Glucose 101 (H) 70 - 99 mg/dL    GFR Estimate 67 >60 mL/min/1.73m2   CBC with platelets   Result Value Ref Range    WBC Count 11.3 (H) 4.0 - 11.0 10e3/uL    RBC Count 3.13 (L) 3.80 - 5.20 10e6/uL    Hemoglobin 10.0 (L) 11.7 - 15.7 g/dL    Hematocrit 31.1 (L) 35.0 - 47.0 %    MCV 99 78 - 100 fL    MCH 31.9 26.5 - 33.0 pg    MCHC 32.2  31.5 - 36.5 g/dL    RDW 13.9 10.0 - 15.0 %    Platelet Count 251 150 - 450 10e3/uL   Nt probnp inpatient   Result Value Ref Range    N terminal Pro BNP Inpatient 4,765 (H) 0 - 1,800 pg/mL   Magnesium   Result Value Ref Range    Magnesium 2.2 1.7 - 2.3 mg/dL   Ionized Calcium   Result Value Ref Range    Calcium Ionized 4.4 4.4 - 5.2 mg/dL   Phosphorus   Result Value Ref Range    Phosphorus 3.9 2.5 - 4.5 mg/dL       Medications      aspirin  81 mg Oral Daily    clopidogrel  75 mg Oral QPM    furosemide  40 mg Oral Daily    gabapentin  100 mg Oral BID    heparin ANTICOAGULANT  5,000 Units Subcutaneous Q12H    hydroxychloroquine  200 mg Oral QPM    leflunomide  20 mg Oral QPM    losartan  50 mg Oral Daily    metoprolol tartrate  25 mg Per Feeding Tube BID    pantoprazole  40 mg Oral QAM AC    rosuvastatin  5 mg Oral QPM    sodium chloride (PF)  10 mL Intravenous Once    spironolactone  12.5 mg Oral Daily

## 2023-07-28 NOTE — PLAN OF CARE
Cognitive/Mentation: A&O, forgetful  VS: B/P: 135/57, T: 98.4, P: 95, R: 17 on 1-2L O2  Tele: SR  GI: Incontinent   : Incontinent and using Purewick  Pulmonary: Weak cough. LS dim.   Pain: Denies     IVs: PIV SL  Drains: N/A  Skin: scattered bruising  Activity: Up with Ax1 GB+W  Diet: Regular diet. Needs to be NPO before lexiscan.     Aggression Stoplight Score: Green  Therapies recs: TCU before home  Discharge: After Lexiscan, possibly tomorrow 7/29     End of shift summary: No acute events overnight. Plan for Lexiscan today, Metoprolol on hold in anticipation of this. Recheck electrolytes today.

## 2023-07-29 NOTE — PROGRESS NOTES
[facility]    Cardiology Consultation - Progress Note     Date of Admission:  7/25/2023  Date of Service: 07/29/23    Reason for Consult   Reason for consult: Heart failure    History of Present Illness   Nazia Goldsmith is a 82 year old female who was admitted on 7/25/2023 for heart failure exacerbation complicated by flash pulmonary edema. Medical comorbidities include HFpEF, pulmonary hypertension, systemic hypertension, valvular heart disease including MR and MS, peripheral arterial disease and rheumatoid arthritis.    She is feeling tired today, has had some incontinence of stool. She denies shortness of breath, chest pain or palpitations. No orthopnea or PND.    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 We will continue to follow, and anticipate cardiac catheterization early next week for further evaluation of her coronaries in the setting of positive stress test, and filling pressures given HFpEF and PH.    Plan:  Continue losartan, metoprolol, spironolactone and furosemide at current doses.  Plan for coronary angiogram and RHC on Monday, 7/31.    Carolina Orellana MD    Primary Care Physician   Torito Watts    Patient Active Problem List   Diagnosis    Acute respiratory failure with hypoxia (H)    Acute on chronic congestive heart failure, unspecified heart failure type (H)       Past Medical History   I have reviewed this patient's medical history and updated it with pertinent information if needed.   No past medical history on file.    Past Surgical History   I have reviewed this patient's surgical history and updated it with pertinent information if needed.  No past surgical history on  file.    Prior to Admission Medications   Prior to Admission Medications   Prescriptions Last Dose Informant Patient Reported? Taking?   acetaminophen (TYLENOL) 500 MG tablet 7/24/2023 at 1854  Yes Yes   Sig: Take 1,000 mg by mouth 3 times daily   amLODIPine (NORVASC) 2.5 MG tablet 7/24/2023 at 0916  Yes Yes   Sig: Take 2.5 mg by mouth daily   aspirin 81 MG EC tablet 7/24/2023 at 0916  Yes Yes   Sig: Take 81 mg by mouth daily   clopidogrel (PLAVIX) 75 MG tablet 7/24/2023 at 1845  Yes Yes   Sig: Take 75 mg by mouth every evening   gabapentin (NEURONTIN) 100 MG capsule 7/24/2023 at 1845  Yes Yes   Sig: Take 100 mg by mouth 2 times daily   hydroxychloroquine (PLAQUENIL) 200 MG tablet 7/24/2023 at 1845  Yes Yes   Sig: Take 200 mg by mouth every evening   ibuprofen (ADVIL/MOTRIN) 600 MG tablet 11/6/2022  Yes Yes   Sig: Take 600 mg by mouth every 6 hours as needed for moderate pain   leflunomide (ARAVA) 20 MG tablet 7/24/2023 at 1845  Yes Yes   Sig: Take 20 mg by mouth every evening   lidocaine HCl 3 % cream 12/28/22 at 0905  Yes Yes   Sig: Apply to right shoulder area of pain twice daily as needed   loperamide (IMODIUM) 2 MG capsule 7/24/2023 at 0800  Yes Yes   Sig: Take 4 mg by mouth daily before breakfast   losartan (COZAAR) 100 MG tablet 7/24/2023 at 0916  Yes Yes   Sig: Take 100 mg by mouth daily   metoprolol succinate ER (TOPROL XL) 50 MG 24 hr tablet 7/24/2023 at 1845  Yes Yes   Sig: Take 50 mg by mouth every evening   multivitamin, therapeutic (THERA-VIT) TABS tablet 7/24/2023 at 0916  Yes Yes   Sig: Take 1 tablet by mouth daily   ondansetron (ZOFRAN ODT) 4 MG ODT tab 04/05/23 at 1840  Yes Yes   Sig: Take 4 mg by mouth every 8 hours as needed for nausea   pantoprazole (PROTONIX) 40 MG EC tablet 7/24/2023 at 0800  Yes Yes   Sig: Take 40 mg by mouth daily   rosuvastatin (CRESTOR) 5 MG tablet 7/24/2023 at 1854  Yes Yes   Sig: Take 5 mg by mouth every evening   sodium chloride 1 GM tablet 7/24/2023 at 1854  Yes Yes    Sig: Take 1 g by mouth 2 times daily   solifenacin (VESICARE) 5 MG tablet 7/24/2023 at 1854  Yes Yes   Sig: Take 5 mg by mouth every evening   spironolactone (ALDACTONE) 25 MG tablet 7/24/2023 at 0900  Yes Yes   Sig: Take 12.5 mg by mouth daily   vitamin D3 (CHOLECALCIFEROL) 50 mcg (2000 units) tablet 7/24/2023 at 0916  Yes Yes   Sig: Take 1 tablet by mouth daily   zoledronic Acid (RECLAST) 5 MG/100ML SOLN infusion   Yes Yes   Sig: Inject 5 mg into the vein once Every year      Facility-Administered Medications: None     Current Facility-Administered Medications   Medication Dose Route Frequency    aspirin  81 mg Oral Daily    clopidogrel  75 mg Oral QPM    furosemide  20 mg Oral Daily    gabapentin  100 mg Oral BID    hydroxychloroquine  200 mg Oral QPM    leflunomide  20 mg Oral QPM    losartan  50 mg Oral Daily    metoprolol tartrate  25 mg Per Feeding Tube BID    pantoprazole  40 mg Oral QAM AC    rosuvastatin  10 mg Oral QPM    spironolactone  12.5 mg Oral Daily     Current Facility-Administered Medications   Medication Last Rate     Allergies   Allergies   Allergen Reactions    Methotrexate Unknown    Sulindac Unknown    Codeine Dizziness    Dyazide [Hydrochlorothiazide W-Triamterene] Nausea and Vomiting    Hydrocodone-Acetaminophen Nausea and Vomiting    Omeprazole Rash    Oxycodone Other (See Comments) and Dizziness     constipation    Ranitidine Rash       Social History        Family History   No family history on file.    Review of Systems   The comprehensive Review of Systems is negative other than noted in the HPI or here.     Physical Exam   Vital Signs with Ranges  Temp:  [97.7  F (36.5  C)-98.3  F (36.8  C)] 98.3  F (36.8  C)  Pulse:  [] 81  Resp:  [14-18] 16  BP: (104-123)/(51-63) 123/63  SpO2:  [90 %-96 %] 90 %  Wt Readings from Last 4 Encounters:   07/29/23 41.5 kg (91 lb 9.6 oz)     I/O last 3 completed shifts:  In: 240 [P.O.:240]  Out: 550 [Urine:550]      Vitals: /63 (BP Location:  "Left arm)   Pulse 81   Temp 98.3  F (36.8  C) (Oral)   Resp 16   Ht 1.575 m (5' 2\")   Wt 41.5 kg (91 lb 9.6 oz)   SpO2 90%   BMI 16.75 kg/m      General: Alert, oriented, in no acute distress  HEENT:  Mucous membranes moist  Neck: Normal JVP  CV: Regular rate and rhythm without murmur  Respiratory: Clear to auscultation bilaterally  GI: Normoactive bowel sounds; soft, non-tender abdomen  Neuro: No focal deficits appreciated  Extremities: No peripheral edema bilaterally    Recent Labs   Lab 07/29/23  0555 07/28/23  0636 07/27/23  0545 07/25/23  1323 07/25/23  0847   WBC 8.0 11.3* 10.1   < >  --    HGB 8.7* 10.0* 9.8*   < >  --    MCV 98 99 97   < >  --     251 214   < >  --    * 131* 130*   < > 133*   POTASSIUM 3.7 3.5 4.2  4.2   < > 4.3   CHLORIDE 94* 93* 92*   < > 96*   CO2 24 25 26   < > 17*   BUN 20.9 16.4 8.2   < > 11.0   CR 0.76 0.86 0.51   < > 0.64   GFRESTIMATED 77 67 >90   < > 87   ANIONGAP 12 13 12   < > 20*   JAMES 8.7* 8.9 8.7*   < > 8.6*   * 101* 97   < > 204*   ALBUMIN  --   --   --   --  3.6   PROTTOTAL  --   --   --   --  6.5   BILITOTAL  --   --   --   --  0.3   ALKPHOS  --   --   --   --  88   ALT  --   --   --   --  14   AST  --   --   --   --  34    < > = values in this interval not displayed.     No results for input(s): CHOL, HDL, LDL, TRIG, CHOLHDLRATIO in the last 49001 hours.  Recent Labs   Lab 07/29/23  0555 07/28/23  0636 07/27/23  0545   WBC 8.0 11.3* 10.1   HGB 8.7* 10.0* 9.8*   HCT 26.5* 31.1* 29.8*   MCV 98 99 97    251 214   IRON 49  --   --    IRONSAT 19  --   --      --   --      Recent Labs   Lab 07/25/23  1323 07/25/23  0912 07/25/23  0841   PHV 7.41 7.23* 7.20*   PO2V 40 23* 86*   PCO2V 44 57* 54*   HCO3V 28 24 21     Recent Labs   Lab 07/29/23  0555 07/28/23  0636 07/27/23  0545   NTBNPI 1,885* 4,765* 5,460*     Recent Labs   Lab 07/25/23  0846   DD 3.15*     No results for input(s): SED, CRP in the last 168 hours.  Recent Labs   Lab " 07/29/23  0555 07/28/23  0636 07/27/23  0545    251 214     Recent Labs   Lab 07/25/23  0847   TSH 1.98     Recent Labs   Lab 07/25/23  0909   COLOR Light Yellow   APPEARANCE Clear   URINEGLC 50*   URINEBILI Negative   URINEKETONE Negative   SG 1.013   UBLD Trace*   URINEPH 6.0   PROTEIN 100*   NITRITE Negative   LEUKEST Negative   RBCU 2   WBCU 3       Imaging:  Recent Results (from the past 48 hour(s))   NM Lexiscan stress test (nuc card)   Result Value    Target     Baseline Systolic     Baseline Diastolic BP 66    Last Stress Systolic     Last Stress Diastolic BP 74    Baseline HR 77    Max HR  127    Max Predicted HR  93    Rate Pressure Product 19,431.0    BP 61    Left Ventricular EF 63    Stress/rest perfusion ratio 0.91    Narrative      The nuclear stress test is abnormal.    There is a medium sized area of nontransmural infarction in the basal   to mid inferolateral, basal anterolateral, and apical lateral segment(s)   of the left ventricle associated with a medium sized area of a mild degree   of gerardo-infarct ischemia.    Left ventricular function is normal.    The left ventricular ejection fraction at rest is 61%.  The left   ventricular ejection fraction at stress is 63%.    LV cavity size small.    Stress to rest cavity ratio is 0.91.  TID is absent.    There is no prior study for comparison.    Findings discussed with inpatient cardiology team.           Medical Decision Making       30 MINUTES SPENT BY ME on the date of service doing chart review, history, exam, documentation & further activities per the note.

## 2023-07-29 NOTE — PROGRESS NOTES
Uneventful shift. Patient A&O x4 w /am assessment, comfortable, slept majority of shift. Denies pain and or respiratory distress. VSS. Adequate UOP via purewick. subcutaneous Heparin given in abdomin LLQ. Incontinent of BMx1 Large.

## 2023-07-29 NOTE — PROGRESS NOTES
[facility]    Medicine Progress Note - Hospitalist Service    Date of Admission:  7/25/2023    Assessment & Plan     Nazia Goldsmith is an 82 very pleasant woman with hx of RA, hx of calcified mitral valve, hx of diastolic heart failure, hx of breast cancer, GERD, PAD who presented 7/25/23 with acute respiratory failure. Found down and unresponsive at her assisted living facility. Was intubated by EMS.  Initial CXR showed pulmonary edema. She was diuresed and extubated, and transferred to hospitalist care     Acute respiratory failure secondary to flash pulmonary edema needing intubation, now extubated on 1L  Valvular disease: mod MR and MS   HFpEF with acute exacerbation, resolving  Severe pulmonary HTN  HTN  ASCVD with type II demand ischemia  Cardiology consulted and following. Appreciate assistance.  - diuretic -transitioned to PO lasix. 20 mg daily.   - appears euvolemic, weight 105 lsb on adission -->91 lbs  - possible RHC on 7/28 as outpatient  - On ASA and Plavix. Also Losartan, spironolactone, metoprolol, rosuvastatin    - stress test abnormal, plan is angio 7/31  - no evidence for pneumonia so antibiotics were stopped     Hypokalemia  -- secondary to diuretics  -- Rest per protocol     Hyperglycemia, resolved  -Suspect related to stress     Macrocytic anemia, acute on chronic  - Hb at 9.8-10 but was noted down to 8.7 today.  Having diarrhea but no hematochezia or melena.  No other source of bleeding.  -Iron panel obtained, low normal iron sat/level, ferritin pending, B12 folate pending.  If ferritin not elevated, will give IV iron  -Follow Hb  -On DAPT, subcu heparin discontinued.     RA  - continue leflunomide and hydroxychloroquine      PAD with history of revascularization  -Monitor     SIADH  Hyponatremia  - Monitor sodium  -- PTA is on salt tablet, sodium fairly stable at 130s.  Given CHF, hold off on salt tablet at this point.  If sodium drops further, will review with  "cardiology to resume     GERD  - PPI    Diarrhea  No abdominal cramp nausea, fever or leukocytosis.  -Imodium as needed     Underweight        Diet: Low Saturated Fat Na <2400 mg    DVT Prophylaxis: Pneumatic Compression Devices and on DOAC  Burch Catheter: Not present  Lines: None     Cardiac Monitoring: ACTIVE order. Indication: Acute decompensated heart failure (48 hours)  Code Status: Full Code      Clinically Significant Risk Factors          # Hypocalcemia: Lowest iCa = 4.3 mg/dL in last 2 days, will monitor and replace as appropriate                # Cachexia: Estimated body mass index is 16.75 kg/m  as calculated from the following:    Height as of this encounter: 1.575 m (5' 2\").    Weight as of this encounter: 41.5 kg (91 lb 9.6 oz)., PRESENT ON ADMISSION          Disposition Plan      Expected Discharge Date: 07/31/2023      Destination: inpatient rehabilitation facility  Discharge Comments: 7-31 [facility]          Yao Steward MD  Hospitalist Service  [facility]  Securely message with [employer] (more info)  Text page via [facility] system Paging/Directory   ______________________________________________________________________    Interval History   Chart reviewed and evaluated patient this morning.  Reports she felt slightly dyspneic while eating breakfast this morning but has since subsided.  Denies chest pain or palpitation.  -Has ongoing diarrhea but no hematochezia or melena.  Denies abdominal cramp or nausea.  Remains afebrile and WBC normal    Physical Exam   Vital Signs: Temp: 98.3  F (36.8  C) Temp src: Oral BP: 123/63 Pulse: 81   Resp: 16 SpO2: 90 % O2 Device: None (Room air)    Weight: 91 lbs 9.6 oz    General: AAOx3, appears comfortable.  HEENT: PERRLA EOMI. Mucosa moist.   Lungs: Bilateral equal air entry. Basal crepitations, normal work of breathing.   CVS: S1S2 regular, no tachycardia or murmur.   Abdomen: Soft, NT, ND. BS heard.  MSK: No edema or deformities.  Neuro: AAOX3. CN " 2-12 normal. Strength symmetrical.  Skin: No rash.       Medical Decision Making       45 MINUTES SPENT BY ME on the date of service doing chart review, history, exam, documentation & further activities per the note.      Data

## 2023-07-29 NOTE — PLAN OF CARE
Alert and oriented x 4. VS stable, on RA and no complaints of pain today. She was up multiple times to the chair and to the bathroom today and tolerated that activity well. She continues to have multiple brown stools today but no evidence of bleeding. She was given Imodium x 1 with moderate improvement in symptoms. Plan for angiogram Monday.

## 2023-07-29 NOTE — PLAN OF CARE
A&O x 3-4, forgetful but easily reoriented. Patient denies pain. VSS, on RA. Up with SBA w/ walker. Tele: ST (100s-110s). Abnormal lexiscan today. Bruising on L hip, unchanged. Plan for Angiogram on Monday. Pt c/o small loose stools when trying to urinate, Imodium ordered. Pt resting comfortably.

## 2023-07-29 NOTE — PROVIDER NOTIFICATION
MD Notification    Notified Person: MD    Notified Person Name: Dinorah    Notification Date/Time: 7/28/2023 21:15    Notification Interaction: Page    Purpose of Notification: [address]..M. Pt states that she is having a small amount of loose stool w/ each trip to the bathroom to urinate. Can we try immodium? Thanks! Jenna [phone]    Orders Received:    Comments:

## 2023-07-30 NOTE — PROGRESS NOTES
[facility]    Medicine Progress Note - Hospitalist Service    Date of Admission:  7/25/2023    Assessment & Plan     Nazia Goldsmith is an 82 very pleasant woman with hx of RA, hx of calcified mitral valve, hx of diastolic heart failure, hx of breast cancer, GERD, PAD who presented 7/25/23 with acute respiratory failure. Found down and unresponsive at her assisted living facility. Was intubated by EMS.  Initial CXR showed pulmonary edema. She was diuresed and extubated, and transferred to hospitalist care     Acute respiratory failure secondary to flash pulmonary edema needing intubation, now extubated on 1L  Valvular disease: mod MR and MS   HFpEF with acute exacerbation, resolving  Severe pulmonary HTN  HTN  ASCVD with type II demand ischemia  Cardiology consulted and following. Appreciate assistance.  - diuretic -transitioned to PO lasix. 20 mg daily.   - appears euvolemic, weight 105 lsb on adission -->91 lbs  - On ASA and Plavix. Also Losartan, spironolactone, metoprolol, rosuvastatin    - stress test abnormal, Plan is coronary angiogram/ RHC 7/31  - no evidence for pneumonia so antibiotics were stopped     Hypokalemia  -- secondary to diuretics  -- Rest per protocol     Hyperglycemia, resolved  -Suspect related to stress     Macrocytic anemia, acute on chronic  - Hb at 9.8-10 but was noted down to 8.7 although now near baseline on recheck, ? 8.7 is lab error.  Having diarrhea but no hematochezia or melena.  No other source of bleeding.  -Iron panel obtained, low normal iron sat/level, ferritin normal. B12 low normal, folate pending.  IV iron x1  -Follow Hb  -On DAPT, subcu heparin discontinued.     RA  - continue leflunomide and hydroxychloroquine      PAD with history of revascularization  -Monitor     SIADH  Hyponatremia  - Monitor sodium  -- PTA is on salt tablet, sodium fairly stable at 130s.  Given CHF, hold off on salt tablet at this point.  If sodium drops further, will review  "with cardiology to resume     GERD  - PPI    Diarrhea  No abdominal cramp nausea, fever or leukocytosis.  -Imodium as needed, has helped  -metamucil added     Underweight        Diet: Low Saturated Fat Na <2400 mg  NPO for Medical/Clinical Reasons Except for: Meds    DVT Prophylaxis: Pneumatic Compression Devices and on DOAC  Burch Catheter: Not present  Lines: None     Cardiac Monitoring: ACTIVE order. Indication: Acute decompensated heart failure (48 hours)  Code Status: No CPR- Pre-arrest intubation OK  Discussed with patient and Son Yosi and code status updated to DNR/pre arrest intubation OK    Clinically Significant Risk Factors          # Hypocalcemia: Lowest iCa = 4.3 mg/dL in last 2 days, will monitor and replace as appropriate                # Cachexia: Estimated body mass index is 16.73 kg/m  as calculated from the following:    Height as of this encounter: 1.575 m (5' 2\").    Weight as of this encounter: 41.5 kg (91 lb 7.9 oz).           Disposition Plan      Expected Discharge Date: 07/31/2023      Destination: inpatient rehabilitation facility  Discharge Comments: 7-31 [facility]          Yao Steward MD  Hospitalist Service  [facility]  Securely message with [employer] (more info)  Text page via [facility] Paging/Directory   ______________________________________________________________________    Interval History     Reviewed with nursing staff and evaluated patient.  Up in the bed eating her breakfast.  Denies dyspnea.  Diarrhea has improved with Imodium.  Denies abdominal cramp or nausea.       Physical Exam   Vital Signs: Temp: 98  F (36.7  C) Temp src: Oral BP: (!) 157/71 Pulse: 87   Resp: 16 SpO2: 96 % O2 Device: None (Room air)    Weight: 91 lbs 7.85 oz    General: AAOx3, appears comfortable.  HEENT: PERRLA EOMI. Mucosa moist.   Lungs: Bilateral equal air entry. Basal crepitations, normal work of breathing.   CVS: S1S2 regular, no tachycardia or murmur.   Abdomen: Soft, NT, ND. " BS heard.  MSK: No edema or deformities.  Neuro: AAOX3. CN 2-12 normal. Strength symmetrical.  Skin: No rash.       Medical Decision Making       35 MINUTES SPENT BY ME on the date of service doing chart review, history, exam, documentation & further activities per the note.      Data

## 2023-07-30 NOTE — PLAN OF CARE
Alert and oriented x 4. VS stable, on RA and no complaints of pain today. She was up on three short walks today and tolerated that activity well. She had fewer loose stools today, Imodium given.  Plan for angiogram and right heart cath on Monday.

## 2023-07-30 NOTE — PROGRESS NOTES
Care Management Follow Up    Length of Stay (days): 5    Expected Discharge Date: 07/31/2023     Concerns to be Addressed:       Patient plan of care discussed at interdisciplinary rounds: Yes    Anticipated Discharge Disposition:       Anticipated Discharge Services:    Anticipated Discharge DME:      Patient/family educated on Medicare website which has current facility and service quality ratings:    Education Provided on the Discharge Plan:    Patient/Family in Agreement with the Plan:      Referrals Placed by CM/SW:    Private pay costs discussed: Not applicable    Additional Information:  Call to [facility] to see if they could still accept patient this week as she was not ready for discharge over the weekend. Writer left a message for admissions requesting a call back.    HANDY Roger

## 2023-07-30 NOTE — PROGRESS NOTES
[facility]    Cardiology Consultation - Progress Note     Date of Admission:  7/25/2023  Date of Service: 07/30/23    Reason for Consult   Reason for consult:     History of Present Illness   Nazia Goldsmith is a 82 year old female who was admitted on 7/25/2023  for heart failure exacerbation complicated by flash pulmonary edema. Medical comorbidities include HFpEF, pulmonary hypertension, systemic hypertension, valvular heart disease including MR and MS, peripheral arterial disease and rheumatoid arthritis.     She is feeling well this morning. No chest pain, dyspnea, palpitations.     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 Planned for coronary angiogram given positive stress test with inferolateral ischemia and right heart cath for evaluation of pulmonary hypertension. Discussed plan with her son who was at bedside. Both were amenable to proceeding.    Plan:  Continue oral Lasix, losartan, metoprolol and spironolactone.  Continue aspirin, clopidogrel and statin.  NPO at midnight.  Coronary angiogram and RHC on Monday, 7/31.    Carolina Orellana MD    Primary Care Physician   Torito Watts    Patient Active Problem List   Diagnosis    Acute respiratory failure with hypoxia (H)    Acute on chronic congestive heart failure, unspecified heart failure type (H)       Past Medical History   I have reviewed this patient's medical history and updated it with pertinent information if needed.   No past medical history on file.    Past Surgical History   I have reviewed this patient's surgical history and updated it with pertinent information if needed.  No past surgical history on  file.    Prior to Admission Medications   Prior to Admission Medications   Prescriptions Last Dose Informant Patient Reported? Taking?   acetaminophen (TYLENOL) 500 MG tablet 7/24/2023 at 1854  Yes Yes   Sig: Take 1,000 mg by mouth 3 times daily   amLODIPine (NORVASC) 2.5 MG tablet 7/24/2023 at 0916  Yes Yes   Sig: Take 2.5 mg by mouth daily   aspirin 81 MG EC tablet 7/24/2023 at 0916  Yes Yes   Sig: Take 81 mg by mouth daily   clopidogrel (PLAVIX) 75 MG tablet 7/24/2023 at 1845  Yes Yes   Sig: Take 75 mg by mouth every evening   gabapentin (NEURONTIN) 100 MG capsule 7/24/2023 at 1845  Yes Yes   Sig: Take 100 mg by mouth 2 times daily   hydroxychloroquine (PLAQUENIL) 200 MG tablet 7/24/2023 at 1845  Yes Yes   Sig: Take 200 mg by mouth every evening   ibuprofen (ADVIL/MOTRIN) 600 MG tablet 11/6/2022  Yes Yes   Sig: Take 600 mg by mouth every 6 hours as needed for moderate pain   leflunomide (ARAVA) 20 MG tablet 7/24/2023 at 1845  Yes Yes   Sig: Take 20 mg by mouth every evening   lidocaine HCl 3 % cream 12/28/22 at 0905  Yes Yes   Sig: Apply to right shoulder area of pain twice daily as needed   loperamide (IMODIUM) 2 MG capsule 7/24/2023 at 0800  Yes Yes   Sig: Take 4 mg by mouth daily before breakfast   losartan (COZAAR) 100 MG tablet 7/24/2023 at 0916  Yes Yes   Sig: Take 100 mg by mouth daily   metoprolol succinate ER (TOPROL XL) 50 MG 24 hr tablet 7/24/2023 at 1845  Yes Yes   Sig: Take 50 mg by mouth every evening   multivitamin, therapeutic (THERA-VIT) TABS tablet 7/24/2023 at 0916  Yes Yes   Sig: Take 1 tablet by mouth daily   ondansetron (ZOFRAN ODT) 4 MG ODT tab 04/05/23 at 1840  Yes Yes   Sig: Take 4 mg by mouth every 8 hours as needed for nausea   pantoprazole (PROTONIX) 40 MG EC tablet 7/24/2023 at 0800  Yes Yes   Sig: Take 40 mg by mouth daily   rosuvastatin (CRESTOR) 5 MG tablet 7/24/2023 at 1854  Yes Yes   Sig: Take 5 mg by mouth every evening   sodium chloride 1 GM tablet 7/24/2023 at 1854  Yes Yes    Sig: Take 1 g by mouth 2 times daily   solifenacin (VESICARE) 5 MG tablet 7/24/2023 at 1854  Yes Yes   Sig: Take 5 mg by mouth every evening   spironolactone (ALDACTONE) 25 MG tablet 7/24/2023 at 0900  Yes Yes   Sig: Take 12.5 mg by mouth daily   vitamin D3 (CHOLECALCIFEROL) 50 mcg (2000 units) tablet 7/24/2023 at 0916  Yes Yes   Sig: Take 1 tablet by mouth daily   zoledronic Acid (RECLAST) 5 MG/100ML SOLN infusion   Yes Yes   Sig: Inject 5 mg into the vein once Every year      Facility-Administered Medications: None     Current Facility-Administered Medications   Medication Dose Route Frequency    aspirin  81 mg Oral Daily    clopidogrel  75 mg Oral QPM    furosemide  20 mg Oral Daily    gabapentin  100 mg Oral BID    hydroxychloroquine  200 mg Oral QPM    iron sucrose  300 mg Intravenous Once    leflunomide  20 mg Oral QPM    losartan  50 mg Oral Daily    metoprolol tartrate  25 mg Per Feeding Tube BID    pantoprazole  40 mg Oral QAM AC    psyllium  2 capsule Oral BID 09 12    rosuvastatin  10 mg Oral QPM    spironolactone  12.5 mg Oral Daily     Current Facility-Administered Medications   Medication Last Rate     Allergies   Allergies   Allergen Reactions    Methotrexate Unknown    Sulindac Unknown    Codeine Dizziness    Dyazide [Hydrochlorothiazide W-Triamterene] Nausea and Vomiting    Hydrocodone-Acetaminophen Nausea and Vomiting    Omeprazole Rash    Oxycodone Other (See Comments) and Dizziness     constipation    Ranitidine Rash       Social History        Family History   No family history on file.    Review of Systems   The comprehensive Review of Systems is negative other than noted in the HPI or here.     Physical Exam   Vital Signs with Ranges  Temp:  [97.7  F (36.5  C)-98.2  F (36.8  C)] 98  F (36.7  C)  Pulse:  [71-95] 87  Resp:  [16] 16  BP: (121-158)/(51-77) 157/71  SpO2:  [94 %-96 %] 96 %  Wt Readings from Last 4 Encounters:   07/30/23 41.5 kg (91 lb 7.9 oz)     I/O last 3 completed shifts:  In:  "900 [P.O.:900]  Out: 600 [Urine:600]      Vitals: BP (!) 157/71 (BP Location: Left arm)   Pulse 87   Temp 98  F (36.7  C) (Oral)   Resp 16   Ht 1.575 m (5' 2\")   Wt 41.5 kg (91 lb 7.9 oz)   SpO2 96%   BMI 16.73 kg/m      General: Alert, oriented, in no acute distress  HEENT: Mucous membranes moist  Neck: Normal JVP  CV: Regular rate and rhythm without murmur  Respiratory: Clear to auscultation bilaterally  GI: Normoactive bowel sounds; soft, non-tender abdomen  Neuro: No focal deficits appreciated  Extremities: No peripheral edema bilaterally    Recent Labs   Lab 07/30/23  0600 07/29/23  0555 07/28/23  0636 07/25/23  1323 07/25/23  0847   WBC 6.9 8.0 11.3*   < >  --    HGB 9.7* 8.7* 10.0*   < >  --    MCV 98 98 99   < >  --     239 251   < >  --    * 130* 131*   < > 133*   POTASSIUM 4.4 3.7 3.5   < > 4.3   CHLORIDE 95* 94* 93*   < > 96*   CO2 26 24 25   < > 17*   BUN 18.2 20.9 16.4   < > 11.0   CR 0.70 0.76 0.86   < > 0.64   GFRESTIMATED 85 77 67   < > 87   ANIONGAP 10 12 13   < > 20*   JAMES 9.0 8.7* 8.9   < > 8.6*   GLC 93 107* 101*   < > 204*   ALBUMIN  --   --   --   --  3.6   PROTTOTAL  --   --   --   --  6.5   BILITOTAL  --   --   --   --  0.3   ALKPHOS  --   --   --   --  88   ALT  --   --   --   --  14   AST  --   --   --   --  34    < > = values in this interval not displayed.     No results for input(s): CHOL, HDL, LDL, TRIG, CHOLHDLRATIO in the last 77168 hours.  Recent Labs   Lab 07/30/23  0600 07/29/23  0555 07/28/23  0636   WBC 6.9 8.0 11.3*   HGB 9.7* 8.7* 10.0*   HCT 30.4* 26.5* 31.1*   MCV 98 98 99    239 251   IRON  --  49  --    IRONSAT  --  19  --    FEB  --  254  --    SIMON  --  157  --    B12  --  264  --      Recent Labs   Lab 07/25/23  1323 07/25/23  0912 07/25/23  0841   PHV 7.41 7.23* 7.20*   PO2V 40 23* 86*   PCO2V 44 57* 54*   HCO3V 28 24 21     Recent Labs   Lab 07/30/23  0600 07/29/23  0555 07/28/23  0636   NTBNPI 1,690 1,885* 4,765*     Recent Labs   Lab " 07/25/23  0846   DD 3.15*     No results for input(s): SED, CRP in the last 168 hours.  Recent Labs   Lab 07/30/23  0600 07/29/23  0555 07/28/23  0636    239 251     Recent Labs   Lab 07/25/23  0847   TSH 1.98     Recent Labs   Lab 07/25/23  0909   COLOR Light Yellow   APPEARANCE Clear   URINEGLC 50*   URINEBILI Negative   URINEKETONE Negative   SG 1.013   UBLD Trace*   URINEPH 6.0   PROTEIN 100*   NITRITE Negative   LEUKEST Negative   RBCU 2   WBCU 3       Imaging:  Recent Results (from the past 48 hour(s))   NM Lexiscan stress test (nuc card)   Result Value    Target     Baseline Systolic     Baseline Diastolic BP 66    Last Stress Systolic     Last Stress Diastolic BP 74    Baseline HR 77    Max HR  127    Max Predicted HR  93    Rate Pressure Product 19,431.0    BP 61    Left Ventricular EF 63    Stress/rest perfusion ratio 0.91    Narrative      The nuclear stress test is abnormal.    There is a medium sized area of nontransmural infarction in the basal   to mid inferolateral, basal anterolateral, and apical lateral segment(s)   of the left ventricle associated with a medium sized area of a mild degree   of gerardo-infarct ischemia.    Left ventricular function is normal.    The left ventricular ejection fraction at rest is 61%.  The left   ventricular ejection fraction at stress is 63%.    LV cavity size small.    Stress to rest cavity ratio is 0.91.  TID is absent.    There is no prior study for comparison.    Findings discussed with inpatient cardiology team.           Medical Decision Making       60 MINUTES SPENT BY ME on the date of service doing chart review, history, exam, documentation & further activities per the note.

## 2023-07-30 NOTE — PLAN OF CARE
Goal Outcome Evaluation:    1900-0730      Pt is A&OX4. VSS on RA. Tele SR. She is up with one assist gait belt and walker. No stools overnight. Good urine output but was incontinent. Plan is for Angio on Monday.

## 2023-07-31 NOTE — CARE PLAN
6027-3156: Alert and oriented x4. vSS. Denies pain. Rt angio site CDI. Up with one and walker. External cath in place. Mag replaced today. Plan to continue to monitor tonight.

## 2023-07-31 NOTE — PLAN OF CARE
Goal Outcome Evaluation:    VSS. Monitor remains Sinus rhythm. Pt. Denies pain. Right groin site stable. Pt. Is alert and oriented, forgetful. Purewick in place. Continue to monitor.

## 2023-07-31 NOTE — PLAN OF CARE
Goal Outcome Evaluation:    [id number]      Pt is A&OX4. VSS on RA. Tele SR. She is up with one assist gait belt and walker. Plan for Angio during the day.

## 2023-07-31 NOTE — PROGRESS NOTES
[facility]    Medicine Progress Note - Hospitalist Service    Date of Admission:  7/25/2023    Assessment & Plan     Nazia Goldsmith is an 82 very pleasant woman with hx of RA, hx of calcified mitral valve, hx of diastolic heart failure, hx of breast cancer, GERD, PAD who presented 7/25/23 with acute respiratory failure. Found down and unresponsive at her assisted living facility. Was intubated by EMS.  Initial CXR showed pulmonary edema. She was diuresed and extubated, and transferred to hospitalist care     Flash pulm edema and Acute respiratory failure requiring intubation, extubated on RA now   Valvular disease: mod MR and MS   HFpEF with acute exacerbation, resolving  Severe pulmonary HTN  HTN  ASCVD with type II demand ischemia  Cardiology consulted and following. Appreciate assistance.  - diuretic -transitioned to PO lasix. 20 mg daily.   - appears euvolemic, weight 105 lsb on adission -->89 lbs  - On ASA and Plavix. Also Losartan, spironolactone, metoprolol, rosuvastatin    - stress test abnormal, Plan is coronary angiogram/ RHC today  - no evidence for pneumonia so antibiotics were stopped     Hypokalemia  -- secondary to diuretics  -- supplement per protocol     Hyperglycemia, resolved  -Suspect related to stress     Macrocytic anemia, acute on chronic  - Hb at 9.8-10 but was noted as low as 8.7. Had diarrhea but no hematochezia or melena.  No other source of bleeding. Hb fairly stable at 9  -Iron panel obtained, low normal iron sat/level, ferritin normal. B12 low normal, folate pending.  IV iron x1  -Follow Hb  -On DAPT, subcu heparin discontinued.     RA  - continue leflunomide and hydroxychloroquine      PAD with history of revascularization  -Monitor     SIADH  Hyponatremia  - Monitor sodium  -- PTA is on salt tablet which on hold given CHF. Sdium fairly stable at 130s.  Given CHF, continue to hold off on salt tablet at this point.  Only if sodium drops further, will review  "with cardiology and if ok then resume     GERD  - PPI    Diarrhea  No abdominal cramp nausea, fever or leukocytosis.  -Imodium as needed, has helped  -metamucil added     Underweight        Diet: NPO for Medical/Clinical Reasons Except for: Meds  NPO for Medical/Clinical Reasons Except for: Meds    DVT Prophylaxis: Pneumatic Compression Devices and on DOAC  Burch Catheter: Not present  Lines: None     Cardiac Monitoring: ACTIVE order. Indication: Acute decompensated heart failure (48 hours)  Code Status: No CPR- Pre-arrest intubation OK  Discussed with patient and Son Yosi and code status updated to DNR/pre arrest intubation OK    Clinically Significant Risk Factors          # Hypocalcemia: Lowest iCa = 4.2 mg/dL in last 2 days, will monitor and replace as appropriate   # Hypomagnesemia: Lowest Mg = 1.6 mg/dL in last 2 days, will replace as needed              # Cachexia: Estimated body mass index is 16.41 kg/m  as calculated from the following:    Height as of this encounter: 1.575 m (5' 2\").    Weight as of this encounter: 40.7 kg (89 lb 11.6 oz).           Disposition Plan     Expected Discharge Date: 07/31/2023      Destination: inpatient rehabilitation facility  Discharge Comments: 7-31 [facility]          Yao Steward MD  Hospitalist Service  [facility]  Securely message with [employer] (more info)  Text page via [employer] Paging/Directory   ______________________________________________________________________    Interval History     Reviewed with nursing staff and evaluated patient this morning.  Denies dyspnea other than sometimes when she eats breakfast.  No chest pain.  States the diarrhea has stopped.        Physical Exam   Vital Signs: Temp: 98.2  F (36.8  C) Temp src: Oral BP: 129/53 Pulse: 76   Resp: 16 SpO2: 95 % O2 Device: None (Room air)    Weight: 89 lbs 11.64 oz    General: AAOx3, appears comfortable.  HEENT: PERRLA EOMI. Mucosa moist.   Lungs: Bilateral equal air entry. Basal " crepitations, normal work of breathing.   CVS: S1S2 regular, no tachycardia or murmur.   Abdomen: Soft, NT, ND. BS heard.  MSK: No edema or deformities.  Neuro: AAOX3. CN 2-12 normal. Strength symmetrical.  Skin: No rash.       Medical Decision Making       25 MINUTES SPENT BY ME on the date of service doing chart review, history, exam, documentation & further activities per the note.      Data

## 2023-07-31 NOTE — PROGRESS NOTES
Care Management Follow Up    Length of Stay (days): 6    Expected Discharge Date: 08/01/2023     Concerns to be Addressed:     Discharge Planning  Patient plan of care discussed at interdisciplinary rounds: Yes    Anticipated Discharge Disposition:  TCU placement     Anticipated Discharge Services:  therapy  Anticipated Discharge DME:  N/A    Patient/family educated on Medicare website which has current facility and service quality ratings:  No  Education Provided on the Discharge Plan:  yes  Patient/Family in Agreement with the Plan:  yes    Referrals Placed by CM/SW:  TCU referrals  Private pay costs discussed: Not applicable    Additional Information:  Received a message back from [facility].  They have a bed available for patient.  Call placed to update them as to patient's status and potential for discharge tomorrow.  Per admissions, they would be able to accept patient tomorrow.    Will continue to follow.      STEPHEN Love, [facility]    [phone]  [facility]

## 2023-07-31 NOTE — PROGRESS NOTES
"Cardiology Progress Note          Assessment and Plan:         82-year-old female with a past medical history significant for moderate pulmonary hypertension, as well as moderate mitral regurgitation and mild mitral stenosis who was admitted to [facility] on 2023 with acute hypoxemic respiratory failure requiring intubation that was thought to be due to diastolic heart failure.  Her echocardiogram this admission demonstrated normal left ventricular size and systolic function with severe pulmonary hypertension.  Moderate mitral regurgitation/stenosis were also identified.    She underwent a Lexiscan stress perfusion study on 2023 which demonstrated a medium sized infarct involving the mid to basal inferolateral/anterolateral and apical lateral segments of the left ventricle.  Right and left heart catheterization is planned today for further evaluation.    IMPRESSION:    Acute exacerbation of diastolic heart failure  Acute hypoxic respiratory failure reintubation secondary to #1  Moderate mitral regurgitation and mitral stenosis with severe pulmonary hypertension on echocardiography this admission.  Mild troponin elevation with an abnormal stress perfusion study suggestive of a previous infarct and ischemia in the circumflex distribution.    PLAN    Right and left heart catheterization today.  I have explained the indications, risks and benefits to the patient in detail who is agreeable with proceeding.          Interval History:     Feels well.  No chest discomfort or dyspnea at rest.             Review of Systems:   As per subjective, otherwise 5 systems reviewed and negative.           Physical Exam:   Blood pressure 129/53, pulse 76, temperature 98.2  F (36.8  C), temperature source Oral, resp. rate 16, height 1.575 m (5' 2\"), weight 40.7 kg (89 lb 11.6 oz), SpO2 95 %.      Vital Sign Ranges  Temperature Temp  Av.2  F (36.8  C)  Min: 98.1  F (36.7  C)  Max: 98.3  F (36.8  C)   Blood pressure " Systolic (24hrs), Av , Min:110 , Max:131        Diastolic (24hrs), Av, Min:53, Max:59      Pulse Pulse  Av.7  Min: 76  Max: 93   Respirations Resp  Av  Min: 16  Max: 16   Pulse oximetry SpO2  Av %  Min: 94 %  Max: 96 %         Intake/Output Summary (Last 24 hours) at 2023 0912  Last data filed at 2023 1800  Gross per 24 hour   Intake 1130 ml   Output --   Net 1130 ml       Constitutional: NAD  Skin: Warm and dry  Neck: No JVD  Lungs: CTA  Cardiovascular: RRR, no m/r/g  Abdomen: Soft, non tender.  Extremities and Back: No LE edema  Neurological: Nonfocal           Medications:     I have reviewed this patient's current medications.              Data:     Labs reviewed.         Bakari Dewey MD, M.D.

## 2023-07-31 NOTE — PRE-PROCEDURE
GENERAL PRE-PROCEDURE:   Procedure:  Coronary angiogram  Date/Time:  7/31/2023 12:25 PM    Verbal consent obtained?: Yes    Written consent obtained?: Yes    Risks and benefits: Risks, benefits and alternatives were discussed    Consent given by:  Patient  Patient states understanding of procedure being performed: Yes    Patient's understanding of procedure matches consent: Yes    Procedure consent matches procedure scheduled: Yes    Expected level of sedation:  Moderate  Appropriately NPO:  Yes  ASA Class:  4  Mallampati  :  Grade 3- soft palate visible, posterior pharyngeal wall not visible  Lungs:  Lungs clear with good breath sounds bilaterally  Heart:  Normal heart sounds and rate  History & Physical reviewed:  History and physical reviewed and no updates needed  Statement of review:  I have reviewed the lab findings, diagnostic data, medications, and the plan for sedation

## 2023-08-01 NOTE — DISCHARGE SUMMARY
[facility]  Hospitalist Discharge Summary      Date of Admission:  7/25/2023  Date of Discharge:  8/1/2023  Discharging Provider: Radha Smith MD  Discharge Service: Hospitalist Service    Discharge Diagnoses   Hospital Course     Nazia Goldsmith is an 82-year-old female with history of rheumatoid arthritis, moderate pulmonary hypertension, moderate mitral regurgitation, mild mitral stenosis who presented on 7/25/2023 with acute hypoxic respiratory failure requiring intubation.  She was found down and unresponsive at her assisted living facility.  She was intubated by EMS.  Chest x-ray showed pulmonary edema.  Respiratory failure was felt to be due to acute diastolic CHF exacerbation.  She was subsequently extubated and transferred to hospitalist service.        Acute hypoxic respiratory failure-resolved  Acute diastolic CHF exacerbation  Coronary artery disease-moderate to severe diffuse LAD and RCA disease  Moderate mitral regurgitation  Mild mitral stenosis  Severe pulmonary hypertension by echo, not noted right heart cath  Essential hypertension  Type II MI  -Required intubation on admission.  Subsequently extubated.  Now doing well on room air  -Has been diuresed.  Appears euvolemic.  Now on Lasix 40 mg daily, continue on discharge.  Weight 105lb -->89lb  -Echo showed normal LVEF of 55-60%, no WMA, normal RV size and function, moderately dilated LA, moderate MR, moderate MS, severe pulmonary hypertension.  -Underwent Lexiscan on 7/28/2023 that showed medium sized infarct involving mid to basal inferior lateral/anterior lateral and apical lateral segments of LV  -Underwent right and left heart cath on 7/31.  This showed normal left and right sided filling pressures.  No pulmonary hypertension.  Diffuse disease in LAD with serial lesions extending from proximal to midportion.   left circumflex with faint collaterals, serial moderate proximal RCA disease.  -Cardiology was consulted.   "Since patient had no symptoms of angina, has complex disease in LAD and RCA, anemia, no evidence of ischemia in LAD/RCA distribution, continued medical management was recommended.  Revascularization options would be discussed further if patient develops symptoms.  -Continue aspirin, Plavix  -Continue losartan, metoprolol, amlodipine  -Continue spironolactone and Lasix  -Outpatient follow-up with cardiology  -BMP check on 8/7/2023      Hypokalemia  -Secondary to diuresis.  Potassium replaced    Stress-induced hyperglycemia  -Resolved    Acute on chronic microcytic anemia  -Hemoglobin stable at 9.  No hematochezia or melena.  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  -Used to be on sodium chloride tablets which were discontinued due to stable sodium and CHF.    GERD  -Continue PPI    Diarrhea  -Stable  -Continue Metamucil and as needed Imodium    Patient discharged to TCU      Clinically Significant Risk Factors     # Cachexia: Estimated body mass index is 16.85 kg/m  as calculated from the following:    Height as of this encounter: 1.575 m (5' 2\").    Weight as of this encounter: 41.8 kg (92 lb 2.4 oz).       Follow-ups Needed After Discharge   Follow-up Appointments     Follow Up and recommended labs and tests      Follow up with [facility] physician.  The following labs/tests are   recommended: BMP in 1 week.        {Additional follow-up instructions/to-do's for PCP    :    Unresulted Labs Ordered in the Past 30 Days of this Admission       No orders found from 6/25/2023 to 7/26/2023.            Discharge Disposition   Discharged to short-term care facility  Condition at discharge: Stable    Hospital Course   See above    Consultations This Hospital Stay   CARDIOLOGY IP CONSULT  PHYSICAL THERAPY ADULT IP " CONSULT  CARE MANAGEMENT / SOCIAL WORK IP CONSULT  SOCIAL WORK IP CONSULT  PHYSICAL THERAPY ADULT IP CONSULT  OCCUPATIONAL THERAPY ADULT IP CONSULT    Code Status   Full Code    Time Spent on this Encounter   I, Radha Smith MD, personally saw the patient today and spent greater than 30 minutes discharging this patient.       Radha Smith MD  [facility]  [address], [address]  [place] [address]  Phone: [phone]  ______________________________________________________________________    Physical Exam   Vital Signs: Temp: 98.9  F (37.2  C) Temp src: Oral BP: 104/42 Pulse: 75   Resp: 17 SpO2: 93 % O2 Device: None (Room air) Oxygen Delivery: 1 LPM  Weight: 92 lbs 2.44 oz      Constitutional - awake and alert, in no acute distress  Cardiovascular - regular rate and rhythm, no murmurs, no edema  Pulmonary - lungs are clear to auscultation bilaterally, no wheezing or rhonchi  GI - abdomen is soft, nontender, nondistended, no hepatosplenomegaly or masses  Integumentary - skin is warm and dry, no rashes or ulcers  Neurological - awake, alert.  Moving all 4 extremities, normal speech, no focal deficits  Bilateral hand deformities due to rheumatoid arthritis         Primary Care Physician   Torito Watts    Discharge Orders      Basic metabolic panel     Follow-Up with Cardiology [facility]      Brief Discharge Instructions    Do NOT stop your aspirin or platelet inhibitor unless directed by your Cardiologist.  These medications help to prevent platelets in your blood from sticking together and forming a clot.  Examples of these medications are:  Ticagrelor (Brilinta), Clopidigrel (Plavix), Prasugrel (Effient)     When to call - Contact the Heart Clinic    You may experience symptoms that require follow-up before your scheduled appointment. Contact the Heart Clinic if you develop: Fever over 100.4o Fahrenheit, that lasts more than one day; Redness, heat, or pus at the puncture  site; Change in color or temperature in your groin or leg.     When to call - Reasons to Call 911    If your groin starts to bleed or begins to swell suddenly after leaving the hospital, lie flat and apply firm pressure just above the puncture site for 15 minutes.  If bleeding continues, call 9-1-1.     Precautions - Lifting    NO lifting of more than 10 pounds for at least 3 days.  If you usually lift 50 pounds or more daily, talk with your Cardiologist.     Precautions - Household Activities    Avoid any hard work or tiring activities.  NO physical activity such as mowing the lawn, raking, vacuuming, changing sheets on your bed, snow shoveling, or using a .     Precautions - Active Sports Activities    Avoid any tiring sports activities.  This includes, yard work, jogging, biking, bowling, swimming, tennis or golf, and sexual activity.     Precautions - Elective Dental Work    NO elective dental work for 6 weeks after receiving a stent.     Comfort and Pain Management - Pain after Surgery    Pain after surgery is normal and expected.  Your leg may be sore or stiff for a few days, and your pain will improve with time. You may take Tylenol or a pain medicine recommended by your Cardiologist.     Comfort and Pain Management - Bruising after Surgery    Bruising around the groin area is normal.  It may take 2-3 weeks for this to go away.  It is normal for the bruised area to turn green and/or yellow as it is healing.  A small lump may also be present and may last 2-3 months.     Activity - Daily Walking    During the day get up and walk around every 2 hours.     Activity - Light Household Activities    Light household activities are ok.     Activity - Elevate Legs    Elevate legs in between all activities.     Activity - Cardiac Rehab    You are encouraged to enroll in an Outpatient Cardiac Rehab program after discharge from the hospital.  Our Cardiac Rehab staff may visit briefly with you while you're in  the hospital.  If they miss you, someone will contact you after you are home.     Return to Driving    Driving is NOT permitted for 24 hours after surgery     Return to work    You may return to work after 72 hours if you are feeling well and your job does not involve heavy lifting.     Dressing Removal    You may take off the dressing on your groin the day after your procedure.     Incision Care    Keep the incision area dry and clean.  You do not need to use a bandage on your incision.     Shower / Bathing    It is ok to shower with regular soap. Pat dry, do not rub. No tub bath for 3 days. No swimming in a pool or hot tub immersion for 1 week     General info for SNF    Length of Stay Estimate: Short Term Care: Estimated # of Days <30  Condition at Discharge: Stable  Level of care:skilled   Rehabilitation Potential: Good  Admission H&P remains valid and up-to-date: Yes  Recent Chemotherapy: N/A  Use Nursing Home Standing Orders: Yes     Mantoux instructions    Give two-step Mantoux (PPD) Per Facility Policy Yes     Follow Up and recommended labs and tests    Follow up with halfway physician.  The following labs/tests are recommended: BMP in 1 week.     Reason for your hospital stay    Shortness of breath, heart failure     Daily weights    Call Provider for weight gain of more than 2 pounds per day or 5 pounds per week.     Activity - Up with assistive device     Activity - Up with nursing assistance     Physical Therapy Adult Consult    Evaluate and treat as clinically indicated.    Reason:  CAD, CHF     Occupational Therapy Adult Consult    Evaluate and treat as clinically indicated.    Reason:  CAD, CHF     Fall precautions     Diet    Follow this diet upon discharge: low sodium diet 2.4g       Significant Results and Procedures   Results for orders placed or performed during the hospital encounter of 07/25/23   XR Chest Port 1 View    Narrative    CHEST ONE VIEW PORTABLE   7/25/2023 8:52 AM     HISTORY:   Post EMS intubation, respiratory distress.    COMPARISON: None available.      Impression    IMPRESSION: AP view of the chest was obtained. Endotracheal tube tip  projects over the low thoracic trachea approximately 3.5 cm from the  lo. Enteric tube crosses the diaphragm and the distal tip projects  over the stomach. Cardiomediastinal silhouette is within normal  limits. Bilateral perihilar predominant pulmonary opacities,  indeterminate, could represent pulmonary edema or infection. No  significant pleural effusion or pneumothorax.    KHANH CHANCE MD         SYSTEM ID:  [id number]   Head CT w/o contrast    Narrative    EXAM: CT HEAD W/O CONTRAST  7/25/2023 10:37 AM     HISTORY: unresponsive       COMPARISON: None    TECHNIQUE: Using multidetector thin collimation helical acquisition  technique, axial, coronal and sagittal CT images from the skull base  to the vertex were obtained without intravenous contrast.   (topogram) image(s) also obtained and reviewed.    Radiation dose for this scan was reduced using automated exposure  control, adjustment of the mA and/or kV according to patient size, or  iterative reconstruction technique.    FINDINGS:    No acute intracranial hemorrhage. No mass effect. Gray-white  differentiation is preserved. Confluent areas of hypoattenuation in  the periventricular and subcortical white matter throughout both  cerebral hemispheres. Generalized cerebral parenchymal volume loss,  commensurate with patient's age. Calcified intracranial  atherosclerosis. Basal cisterns are patent. No hydrocephalus. Normal  positioning and morphology of the cerebellar tonsils.    Atraumatic calvarium. Clear paranasal sinuses, mastoid air cells.  Nonfocal orbits.       Impression    IMPRESSION:     1.  No CT evidence of acute intracranial abnormality.   2.  Findings suggestive of advanced chronic microvascular ischemic  disease.   3.  Calcified intracranial atherosclerosis.    OLIVE CANALES,  MD         SYSTEM ID:  [id number]   CT Chest Pulmonary Embolism w Contrast    Narrative    CT CHEST PULMONARY EMBOLISM WITH CONTRAST  7/25/2023 10:38 AM    HISTORY: Postintubation respiratory distress. Elevated dimer; Rule out  PE.    TECHNIQUE: Scans obtained from the apices through the diaphragm with  IV contrast. 53mL [id number] IV injected. Radiation dose for this scan  was reduced using automated exposure control, adjustment of the mA  and/or kV according to patient size, or iterative reconstruction  technique. 2D and 3D MIP reconstructions were performed by the CT  technologist    COMPARISON:  Chest x-ray on same day earlier.    FINDINGS:  Chest/mediastinum: No evidence of pulmonary embolism. The endotracheal  tube tip is in the midthoracic trachea. Enteric tube crosses the  diaphragm with the distal tip outside the field of view. No  cardiomegaly or significant pericardial effusion. Moderate  atherosclerotic vascular calcification of the coronary arteries.  Multiple enlarged mediastinal and hilar lymph nodes, indeterminate,  could be reactive or metastatic.    Lungs and pleura: Small bilateral pleural effusions and associated  basilar atelectasis/consolidation. Bilateral upper lobe predominant  patchy groundglass pulmonary opacities, likely infectious. Multiple  partially calcified and some centrally necrotic nodular opacities,  indeterminate.    Upper abdomen: Limited evaluation of the upper abdomen due to lack of  coverage and timing of contrast.    Bones and soft tissue: Multiple compression deformities of the  visualized thoracic spine, including T6, T9 and T12 vertebra with  approximately 90% vertebral height loss.      Impression    IMPRESSION:   1. No evidence of pulmonary embolism.  2. Bilateral upper lobe predominant patchy and groundglass pulmonary  opacities, likely infectious.  3. Multiple partially calcified and sometimes centrally necrotic  nodular pulmonary opacities, indeterminate, could be  infectious or  neoplastic.  4. Small bilateral pleural effusions and associated basilar  atelectasis/consolidation.    KHANH CHANCE MD         SYSTEM ID:  [id number]   XR Chest Port 1 View    Narrative    EXAM: XR CHEST PORT 1 VIEW  LOCATION: [facility]  DATE: 2023    INDICATION: s p intubation; interval eval  COMPARISON: 2023 radiograph, 2023 chest CT      Impression    IMPRESSION: Endotracheal tube tip 2.8 cm above the lo. Gastric drainage tube extends to the stomach. Interval resolution of pulmonary edema. Right lung nodules better evaluated on recent CT. Likely persistent small pleural effusions. No pneumothorax.   Echo Complete     Value    LVEF  55-60%    Narrative    [id number]  [id number]  [id number]  [id number]^JATINDER^BELKIS     [facility]  Echocardiography Laboratory  [address] [place], [place] [address]     Name: WALTER MORENO  MRN: [id number]  : 1940  Study Date: 2023 09:16 AM  Age: 82 yrs  Gender: Female  Patient Location: [facility]  Reason For Study: Other, Please Specify in Comments  Ordering Physician: BELKIS TOBIAS  Referring Physician: BELKIS TOBIAS  Performed By: Shin Gramajo     BSA: 1.5 m2  Height: 64 in  Weight: 105 lb  HR: 77  BP: 181/80 mmHg  ______________________________________________________________________________  Procedure  Complete Echo Adult. Optison (NDC #[id number]) given intravenously.  ______________________________________________________________________________  Interpretation Summary     1. Left ventricular systolic function is normal. The visual ejection fraction  is 55-60%.  2. No regional wall motion abnormalities noted.  3. The right ventricle is normal in structure, function and size.  4. The left atrium is moderately dilated.  5. There is moderate (2+) mitral regurgitation.  6. There is moderate mitral stenosis; mean gradient 7 mmHg at HR 80 bpm.  7. Severe pulmonary hypertension; The  right ventricular systolic pressure is  approximated at 56mmHg plus the right atrial pressure. IVC diameter <2.1 cm  collapsing >50% with sniff suggests a normal RA pressure of 3 mmHg.  ______________________________________________________________________________  Left Ventricle  The left ventricle is normal in size. There is normal left ventricular wall  thickness. Left ventricular systolic function is normal. The visual ejection  fraction is 55-60%. Left ventricular diastolic function is normal. No regional  wall motion abnormalities noted.     Right Ventricle  The right ventricle is normal in structure, function and size.     Atria  The left atrium is moderately dilated. Right atrial size is normal. There is  no color Doppler evidence of an atrial shunt.     Mitral Valve  There is mild mitral annular calcification. There is moderate (2+) mitral  regurgitation. There is moderate mitral stenosis.     Tricuspid Valve  The tricuspid valve is normal in structure and function. There is mild to  moderate (1-2+) tricuspid regurgitation. The right ventricular systolic  pressure is approximated at 56mmHg plus the right atrial pressure.     Aortic Valve  The aortic valve is trileaflet with aortic valve sclerosis.     Pulmonic Valve  The pulmonic valve is not well seen, but is grossly normal.     Vessels  Normal size aorta. IVC diameter <2.1 cm collapsing >50% with sniff suggests a  normal RA pressure of 3 mmHg.     Pericardium  There is no pericardial effusion.     Rhythm  Sinus rhythm was noted.  ______________________________________________________________________________  MMode/2D Measurements & Calculations  IVSd: 1.1 cm     LVIDd: 3.3 cm  LVIDs: 3.0 cm  LVPWd: 1.1 cm  FS: 10.3 %  LV mass(C)d: 110.2 grams  LV mass(C)dI: 74.1 grams/m2  Ao root diam: 3.2 cm  asc Aorta Diam: 3.4 cm  LVOT diam: 1.7 cm  LVOT area: 2.3 cm2  LA Volume (BP): 76.9 ml  LA Volume Index (BP): 51.6 ml/m2  RWT: 0.69  TAPSE: 1.8 cm     Doppler  Measurements & Calculations  MV E max erlin: 162.0 cm/sec  MV A max erlin: 162.0 cm/sec  MV E/A: 1.0  MV max P.1 mmHg  MV mean P.0 mmHg  MV V2 VTI: 43.7 cm  MVA(VTI): 1.7 cm2  MV dec slope: 743.0 cm/sec2  MV dec time: 0.22 sec  Ao V2 max: 161.5 cm/sec  Ao max PG: 10.0 mmHg  CECE(V,D): 1.8 cm2  LV V1 max P.9 mmHg  LV V1 max: 131.0 cm/sec  LV V1 VTI: 32.0 cm  SV(LVOT): 72.6 ml  SI(LVOT): 48.8 ml/m2  PA V2 max: 122.0 cm/sec  PA max P.0 mmHg  PA acc time: 0.12 sec  TR max erlin: 374.0 cm/sec  TR max P.0 mmHg  AV Erlin Ratio (DI): 0.81  E/E' av.4  Lateral E/e': 20.1  Medial E/e': 34.6  RV S Erlin: 14.9 cm/sec     ______________________________________________________________________________  Report approved by: Ara Calderon 2023 12:05 PM         Cardiac Catheterization    Narrative      Ost RCA lesion is 50% stenosed.    1.  Diffusely diseased LAD with serial lesions extending from the proximal   to midportion of the vessel  2.   LCx with faint left to left to left and right to left collaterals  3.  Serial moderate proximal RCA lesions present  4.  Normal left-sided filling pressures (PCWP 7 mmHg)  5.  Normal right-sided filling pressures (RA 2 mmHg)  6.  No pulmonary hypertension present (mean PA 17 mmHg)  7.  Normal cardiac index (2.56)   NM Lexiscan stress test (nuc card)     Value    Target     Baseline Systolic     Baseline Diastolic BP 66    Last Stress Systolic     Last Stress Diastolic BP 74    Baseline HR 77    Max HR  127    Max Predicted HR  93    Rate Pressure Product 19,431.0    BP 61    Left Ventricular EF 63    Stress/rest perfusion ratio 0.91    Narrative      The nuclear stress test is abnormal.    There is a medium sized area of nontransmural infarction in the basal   to mid inferolateral, basal anterolateral, and apical lateral segment(s)   of the left ventricle associated with a medium sized area of a mild degree   of gerardo-infarct ischemia.    Left  ventricular function is normal.    The left ventricular ejection fraction at rest is 61%.  The left   ventricular ejection fraction at stress is 63%.    LV cavity size small.    Stress to rest cavity ratio is 0.91.  TID is absent.    There is no prior study for comparison.    Findings discussed with inpatient cardiology team.         Discharge Medications   Current Discharge Medication List        START taking these medications    Details   furosemide (LASIX) 40 MG tablet Take 1 tablet (40 mg) by mouth daily    Associated Diagnoses: Acute on chronic congestive heart failure, unspecified heart failure type (H)      psyllium (METAMUCIL/KONSYL) capsule Take 2 capsules by mouth daily  Qty: 180 capsule, Refills: 3    Comments: Pharmacy to dispense capsule size that is available.  Associated Diagnoses: Diarrhea, unspecified type           CONTINUE these medications which have CHANGED    Details   losartan (COZAAR) 50 MG tablet Take 1 tablet (50 mg) by mouth daily    Associated Diagnoses: Acute on chronic congestive heart failure, unspecified heart failure type (H)      rosuvastatin (CRESTOR) 10 MG tablet Take 1 tablet (10 mg) by mouth every evening    Associated Diagnoses: Coronary artery disease involving native heart without angina pectoris, unspecified vessel or lesion type           CONTINUE these medications which have NOT CHANGED    Details   acetaminophen (TYLENOL) 500 MG tablet Take 1,000 mg by mouth 3 times daily      amLODIPine (NORVASC) 2.5 MG tablet Take 2.5 mg by mouth daily      aspirin 81 MG EC tablet Take 81 mg by mouth daily      clopidogrel (PLAVIX) 75 MG tablet Take 75 mg by mouth every evening      gabapentin (NEURONTIN) 100 MG capsule Take 100 mg by mouth 2 times daily      hydroxychloroquine (PLAQUENIL) 200 MG tablet Take 200 mg by mouth every evening      ibuprofen (ADVIL/MOTRIN) 600 MG tablet Take 600 mg by mouth every 6 hours as needed for moderate pain      leflunomide (ARAVA) 20 MG tablet Take  20 mg by mouth every evening      lidocaine HCl 3 % cream Apply to right shoulder area of pain twice daily as needed      loperamide (IMODIUM) 2 MG capsule Take 4 mg by mouth daily before breakfast      metoprolol succinate ER (TOPROL XL) 50 MG 24 hr tablet Take 50 mg by mouth every evening      multivitamin, therapeutic (THERA-VIT) TABS tablet Take 1 tablet by mouth daily      ondansetron (ZOFRAN ODT) 4 MG ODT tab Take 4 mg by mouth every 8 hours as needed for nausea      pantoprazole (PROTONIX) 40 MG EC tablet Take 40 mg by mouth daily      solifenacin (VESICARE) 5 MG tablet Take 5 mg by mouth every evening      spironolactone (ALDACTONE) 25 MG tablet Take 12.5 mg by mouth daily      vitamin D3 (CHOLECALCIFEROL) 50 mcg (2000 units) tablet Take 1 tablet by mouth daily      zoledronic Acid (RECLAST) 5 MG/100ML SOLN infusion Inject 5 mg into the vein once Every year           STOP taking these medications       sodium chloride 1 GM tablet Comments:   Reason for Stopping:             Allergies   Allergies   Allergen Reactions    Methotrexate Unknown    Sulindac Unknown    Codeine Dizziness    Dyazide [Hydrochlorothiazide W-Triamterene] Nausea and Vomiting    Hydrocodone-Acetaminophen Nausea and Vomiting    Omeprazole Rash    Oxycodone Other (See Comments) and Dizziness     constipation    Ranitidine Rash

## 2023-08-01 NOTE — PROGRESS NOTES
Cardiology Progress Note          Assessment and Plan:         82-year-old female with a past medical history significant for moderate pulmonary hypertension, as well as moderate mitral regurgitation and mild mitral stenosis who was admitted to [facility] on 7/25/2023 with acute hypoxemic respiratory failure requiring intubation that was thought to be due to diastolic heart failure.  Her echocardiogram this admission demonstrated normal left ventricular size and systolic function with severe pulmonary hypertension.  Moderate mitral regurgitation/stenosis were also identified.    She underwent a Lexiscan stress perfusion study on 7/28/2023 which demonstrated a medium sized infarct involving the mid to basal inferolateral/anterolateral and apical lateral segments of the left ventricle.  Subsequent right and left heart catheterization yesterday demonstrated normal filling pressures with no evidence of pulmonary hypertension.  She was found to have a chronic total occlusion of the circumflex with diffuse disease involving the LAD as well as the right coronary artery.      IMPRESSION:    Acute exacerbation of diastolic heart failure  Acute hypoxic respiratory failure reintubation secondary to #1  Moderate mitral regurgitation and mitral stenosis with severe pulmonary hypertension on echocardiography this admission.  Subsequent right heart catheterization demonstrated normal filling pressures after diuresis.  Moderate to severe diffuse LAD and right coronary artery disease.      PLAN    I discussed the coronary angiography findings with the patient and family yesterday in detail.  As we discussed, she has no symptoms suggestive of angina and given the complexity of her LAD/RCA disease, lack of anginal symptoms, current anemia and no evidence of ischemia in this distribution based on the stress perfusion study medical therapy would be appropriate as a first option.  If she experiences recurrent respiratory symptoms  "and/or any evidence of angina we could certainly discuss revascularization options.  The patient and her sons both expressed their understanding and agreement.    She is currently on excellent medical regimen.  I would recommend cardiac rehab and possible discharge later today if she remains asymptomatic.  We will arrange outpatient follow-up.        Interval History:     Feels well.  No chest discomfort or dyspnea at rest.             Review of Systems:   As per subjective, otherwise 5 systems reviewed and negative.           Physical Exam:   Blood pressure 104/44, pulse 98, temperature 98.8  F (37.1  C), temperature source Oral, resp. rate 17, height 1.575 m (5' 2\"), weight 41.8 kg (92 lb 2.4 oz), SpO2 92 %.      Vital Sign Ranges  Temperature Temp  Av.2  F (36.8  C)  Min: 98.1  F (36.7  C)  Max: 98.3  F (36.8  C)   Blood pressure Systolic (24hrs), Av , Min:110 , Max:131        Diastolic (24hrs), Av, Min:53, Max:59      Pulse Pulse  Av.7  Min: 76  Max: 93   Respirations Resp  Av  Min: 16  Max: 16   Pulse oximetry SpO2  Av %  Min: 94 %  Max: 96 %         Intake/Output Summary (Last 24 hours) at 2023 0912  Last data filed at 2023 1800  Gross per 24 hour   Intake 1130 ml   Output --   Net 1130 ml       Constitutional: NAD  Skin: Warm and dry  Neck: No JVD  Lungs: CTA  Cardiovascular: RRR, no m/r/g  Abdomen: Soft, non tender.  Extremities and Back: No LE edema  Neurological: Nonfocal           Medications:     I have reviewed this patient's current medications.              Data:     Labs reviewed.         Bakari Dewey MD, M.D.    "

## 2023-08-01 NOTE — PLAN OF CARE
Physical Therapy Discharge Summary    Reason for therapy discharge:    Discharged to transitional care facility.    Progress towards therapy goal(s). See goals on Care Plan in [facility] electronic health record for goal details.  Goals partially met.  Barriers to achieving goals:   discharge from facility.    Therapy recommendation(s):    Continued therapy is recommended.  Rationale/Recommendations:  Patient would benefit from continued skilled PT to progress her functional independence and endurance prior to discharging to home.    Goal Outcome Evaluation:

## 2023-08-01 NOTE — PROVIDER NOTIFICATION
Pt discharged to TCU, VSS stable, denies pain and agreeable to plan. All belongings sent with pt. Gave TCU discharge packet to son to turn in to nurse at TCU. Transport provided by pts son.

## 2023-08-01 NOTE — PROGRESS NOTES
Care Management Discharge Note    Discharge Date: 08/01/2023       Discharge Disposition: Transitional Care    Discharge Services:  Therapy    Discharge DME: None    Discharge Transportation: Patient's son will transport around 14:00 today    Private pay costs discussed: Not applicable    Does the patient's insurance plan have a 3 day qualifying hospital stay waiver?  No    PAS Confirmation Code: [id number]  Patient/family educated on Medicare website which has current facility and service quality ratings: yes    Education Provided on the Discharge Plan: Yes  Persons Notified of Discharge Plans:  Patient's maria de jesus Deluca  Patient/Family in Agreement with the Plan: yes    Handoff Referral Completed: No    Additional Information:  Received discharge orders for patient.  Bed available at [facility] for today.  Patient's son will transport around 14:00 today.  Patient and son informed of the plan and in agreement to the plan.  Call placed to update [facility] to update them and faxed the orders and the PAS.      PAS-RR    D: Per DHS regulation, SW completed and submitted PAS-RR to [place] [employer] Direct Connect via the Senior LinkAge Line.  PAS-RR confirmation # is :  [id number].    I: SW spoke with patient's son Yosi  and they are aware a PAS-RR has been submitted.  SW reviewed with patient's son Yosi that they may be contacted for a follow up appointment within 10 days of hospital discharge if their SNF stay is < 30 days.  Contact information for [facility] LinkAge Line was also provided.    A: Maria De Jesus Deluca verbalized understanding.    P: Further questions may be directed to [facility] LinkAge Line at #[phone], option #4 for PAS-RR staff.        STEPHEN Love, [facility]    [phone]  [facility]

## 2023-08-01 NOTE — PLAN OF CARE
Goal Outcome Evaluation:  Orientations: A/O x 4, forgetful  Vitals/Pain: VSS on RA, 1L NC overnight. Denies pain. Nausea and 1 emesis @ 0400. PO Zofran administered x 1 with relief of symptoms.  -tolerating low sat fat diet  Tele: SR with PVCs/PACs  Lines/Drains: PIV x 2 RA SL  Skin/Wounds: R angio site CDI/WNL. Big bruise to L hip. Scattered bruising  GI/: Purewick removed due to not adhering to anatomy. Wearing brief in bed. Incontinent of BB. X 2 small BM overnight. Adequate urine output  Labs: Abnormal/Trends, Electrolyte Replacement- K/Mg/Phos high replacement protocol  Ambulation/Assist: A x 1 with walker  Sleep Quality: good  Plan: Continue poc

## 2023-08-02 NOTE — PROGRESS NOTES
[facility] Care Resource [place]    Background: Transitional Care Management program identified per system criteria and reviewed by [facility] team for possible outreach.    Assessment: Upon chart review, CCRC Team member will not proceed with patient outreach related to this episode of Transitional Care Management program due to reason below:    Non-[facility] TCU: CCRC team member noted patient discharged to TCU/ARU/LTACH. Patient is not established with a [facility] currently supported by Primary Care-Care Coordination therefore handoff to Primary Care-Care Coordination is not appropriate at this time.    Plan: Transitional Care Management episode addressed appropriately per reason noted above.      Michelle Casper RN  [facility], [facility]    *Connected Care Resource Team does NOT follow patient ongoing. Referrals are identified based on internal discharge reports and the outreach is to ensure patient has an understanding of their discharge instructions.

## 2023-08-03 NOTE — TELEPHONE ENCOUNTER
Patient was admitted to [facility] on 7/25/23 with acute hypoxemic respiratory failure requiring intubation that was thought to be due to diastolic heart failure.     PMH: moderate pulmonary hypertension, as well as moderate mitral regurgitation and mild mitral stenosis, RA.      7/26/23: Echo showed EF of 55-60%. No regional wall motion abnormalities noted. The right ventricle is normal in structure, function and size. The left atrium is moderately dilated. There is moderate (2+) mitral regurgitation. There is moderate mitral stenosis; mean gradient 7 mmHg at HR 80 bpm. Severe pulmonary hypertension.    7/28/23: NM Lexiscan showing a medium sized area of nontransmural infarct in the basal to mid inferolateral, basal anterolateral and apical lateral segments of the LV associated with a medium sized are of a mild degree of gerardo-infarct ischemia.     7/31/23: LHC via LFA showed diffusely diseased LAD with serial lesions extending from the proximal to midportion of the vessel.  LCx with faint left to left to left and right to left collaterals. Serial moderate proximal RCA lesions present. RHC via RFV showed normal left and right filling pressures. No pHTN present.    IV Lasix diuresed.    Pt was started on Lasix. PTA Crestor was increased and Cozaar was decreased at time of discharge.    Pt is scheduled for labs on 8/11/23 at 1300 and an OV on 9/8/23 at 1220 with AMADOU Isabel Pecking at our [place] Office.    Pt was discharged to [facility] T[facility].    Writer called TCU and appts as above were verified. They will drawn BMP there on 8/11/23 if pt remains in TCU. Lab notes updated. No further questions. ETHEL Chiu RN.

## 2023-08-09 NOTE — TELEPHONE ENCOUNTER
Dr. Michelle:    You recently saw this patient in the hospital for respiratory failure and acute diastolic HF.    The question I have been asked is whether she needs a BMP that is scheduled this Friday as she doesn't see Isabel S until 9/8. Maybe wait and do on the 8th?    Vitals: BP today 110/51-76    Weights: Discharge on 8/1- 89#    8/6  92# on admission to TCU  8/7  96.0  8/8  96.4   Today 95.8    She is on a 1L fluid intake restriction due to hyponatremia.    MEDS:  Lasix 40 MG daily, Spironolactone 12.5 MG daily Losartan 50 MG daily, Toprol 50 MG daily and Norvasc 2.5 MG daily.    She just had BMP today-looks stable but I need your input when labs should be done next-Thanks      odium 136 - 145 mmol/L 131 Low  131 Low  130 Low  131 Low  130 Low  131 Low   130 Low      Potassium 3.4 - 5.3 mmol/L 4.6 4.4 4.4 4.4 3.7 3.5 4.2 4.2    Chloride 98 - 107 mmol/L 95 Low  95 Low  94 Low  95 Low  94 Low  93 Low   92 Low     Carbon Dioxide (CO2) 22 - 29 mmol/L 23 25 25 26 24 25  26    Anion Gap 7 - 15 mmol/L 13 11 11 10 12 13  12    Urea Nitrogen 8.0 - 23.0 mg/dL 28.8 High  20.5 18.0 18.2 20.9 16.4  8.2    Creatinine 0.51 - 0.95 mg/dL 0.99 High  0.77 0.68 0.70 0.76 0.86  0.51    Calcium 8.8 - 10.2 mg/dL 8.9 8.4 Low  8.6 Low  9.0 8.7 Low  8.9  8.7 Low     Glucose 70 - 99 mg/dL 147 High  100 High  91 93 107 High  101 High   97    GFR Estimate >60 mL/min/1.73m2 56 Low  76 86 85 77 67  >90

## 2023-08-09 NOTE — TELEPHONE ENCOUNTER
[facility] Call Center    Phone Message    May a detailed message be left on voicemail: yes     Reason for Call: Other: Sheri is calling to confirm that patient had a BMP done yesterday and today and is wondering if she will still need another this Friday 08/11/23? Please return call back to advise.      Action Taken: Other: Cardiology    Travel Screening: Not Applicable    Thank you!  [facility]

## 2023-08-10 NOTE — TELEPHONE ENCOUNTER
Jeremie Michelle MD Crawford, William W. RN  Caller: Unspecified (Yesterday,  1:59 PM)  No. Please get it done as scheduled.          Previous Messages      I left a message with Sheri at [phone] that Dr. Michelle wants her to keep the BMP appt scheduled for tomorrow 8/11

## 2023-08-11 NOTE — TELEPHONE ENCOUNTER
[facility] Call Center    Phone Message    May a detailed message be left on voicemail: yes     Reason for Call: Other: Corina is asking for a call back to discuss why labs can not be drawn at the TCU today.     Action Taken: Other: cardio    Travel Screening: Not Applicable    Thank you!  [facility]

## 2023-08-11 NOTE — TELEPHONE ENCOUNTER
I called TCU at 2:00 and left a phone message to call [phone] and leave their fax number so that the BMP order can be faxed over.    Between 2:05 and 4:15 I checked team 3 phone messages and did not hear anything back from the TCU.    Plan:    When this writer is back in clinic on 8/15 I will attempt to reach them and hopefully be able to get the order over to them. I do not deem this an emergency as her BMP on 8/9 was pretty much normal

## 2023-08-11 NOTE — TELEPHONE ENCOUNTER
I left a message with 3 rd floor TCU that the blood can be drawn at their facility rather than having patient come to hospital for the BMP today at 1:00.    I asked that they call [phone] to cancel the hospital appt if they do draw blood at TCU.

## 2023-08-11 NOTE — TELEPHONE ENCOUNTER
[facility] Call Center    Phone Message    May a detailed message be left on voicemail: yes     Reason for Call: Other: TCU requesting orders be sent for patient to have BMP done at their location per previous TE. TCU hung up before confirming fax #.      Action Taken: Other: Cardiology    Travel Screening: Not Applicable'    Thank you!  [facility]

## 2023-10-13 PROBLEM — S06.5XAA SUBDURAL HEMATOMA (H): Status: ACTIVE | Noted: 2023-01-01

## 2023-10-13 NOTE — ED TRIAGE NOTES
Patient presents to ER via EMS. Per report, patient comes from [facility]. Nursing staff called EMS for reports of increased confusion which was abnormal for patients baseline. When EMS was moving patient, patient endorsed chest pain. Monitor for them showed NSTEMI. 324 of Asprin and 4 mg of Zofran given in route.      Triage Assessment (Adult)       Row Name 10/12/23 0351          Triage Assessment    Airway WDL WDL        Respiratory WDL    Respiratory WDL WDL        Cardiac WDL    Cardiac WDL WDL        Peripheral/Neurovascular WDL    Peripheral Neurovascular WDL WDL        Cognitive/Neuro/Behavioral WDL    Cognitive/Neuro/Behavioral WDL WDL

## 2023-10-13 NOTE — PROVIDER NOTIFICATION
"Update to hospitalist    \"repeat CT complete, waiting to hear from neuro surgery. FYI sodium 128.\"  "

## 2023-10-13 NOTE — PROGRESS NOTES
"   10/13/23 1129   Appointment Info   Signing Clinician's Name / Credentials (OT) Renetta Chappell, OTR/L   Quick Adds   Quick Adds Certification   Living Environment   People in Home alone   Current Living Arrangements assisted living   Home Accessibility no concerns   Transportation Anticipated family or friend will provide;agency   Living Environment Comments Pt live at the [facility] living. Receives HH OT/PT. At baseline, pt can request for assistance with ADLs.   Self-Care   Usual Activity Tolerance moderate   Current Activity Tolerance fair   Regular Exercise Yes  (some exercie during HH OT/PT. Does not participte in exercise activities, but family encouraging.)   Equipment Currently Used at Home walker, rolling;shower chair;grab bar, tub/shower;grab bar, toilet;raised toilet seat;other (see comments)  (has a life alert, bed rail, reacher, adapted eating utensils for RA)   Fall history within last six months yes   Number of times patient has fallen within last six months 1   Instrumental Activities of Daily Living (IADL)   IADL Comments Receives all IADL services at baseline.   General Information   Onset of Illness/Injury or Date of Surgery 10/12/23   Referring Physician Fausto Morin MD   Patient/Family Therapy Goal Statement (OT) Per chart review, \"Nazia Goldsmith is a 83 year old female with a past medical history of rheumatoid arthritis, pulmonary hypertension, mitral regurgitation, mitral stenosis, coronary artery disease, heart failure with preserved ejection fraction presents to hospital for altered mental status.\"   Existing Precautions/Restrictions fall   Limitations/Impairments safety/cognitive   Cognitive Status Examination   Orientation Status person   Cognitive Status Comments Not oriented to day of week, required max cues to identify hospital and location.   Visual Perception   Visual Impairment/Limitations WFL   Sensory   Sensory Comments RA impacts sensation in B hands   Pain " Assessment   Patient Currently in Pain No   Posture   Posture forward head position;kyphosis   Range of Motion Comprehensive   Comment, General Range of Motion Limited UE ROM   Coordination   Coordination Comments Fine motor coordination affected by RA   Transfers   Transfers sit-stand transfer   Sit-Stand Transfer   Sit-Stand [place] (Transfers) modified independence;contact guard   Activities of Daily Living   BADL Assessment/Intervention bathing;upper body dressing;lower body dressing;clothing fastener management;grooming;toileting   Bathing Assessment/Intervention   [place] Level (Bathing) moderate assist (50% patient effort)   Comment, (Bathing) Per clinical judgement   Assistive Devices (Bathing) hand-held shower spray hose;shower chair;grab bar, tub/shower   Upper Body Dressing Assessment/Training   Comment, (Upper Body Dressing) Per clinical judgement   [place] Level (Upper Body Dressing) set up;moderate assist (50% patient effort)   Lower Body Dressing Assessment/Training   Position (Lower Body Dressing) supported sitting   Comment, (Lower Body Dressing) initial assistance required to initiate LB dressing due to limited fine motor control and limited bending ROM/LOB   [place] Level (Lower Body Dressing) set up;moderate assist (50% patient effort)   Clothes Fastener Management   [place] Level (Clothes Fastener Management) moderate assist (50% patient effort)   Comment, (Clothes Fastener Management) Per clinical judgement (RA)   Grooming Assessment/Training   Position (Grooming) supported sitting   [place] Level (Grooming) set up;modified independence   Toileting   [place] Level (Toileting) modified independence;minimum assist (75% patient effort);supervision   Clinical Impression   Criteria for Skilled Therapeutic Interventions Met (OT) Yes, treatment indicated   OT Diagnosis Pt presents with limited by activity tolerance, cognition, general weakness, RA symptoms, and  balance deficits.   OT Problem List-Impairments impacting ADL problems related to;balance;cognition;mobility;muscle tone;range of motion (ROM);strength;sensation   Assessment of Occupational Performance 1-3 Performance Deficits   Identified Performance Deficits dressing, bathing, toileting   Planned Therapy Interventions (OT) ADL retraining;IADL retraining;balance training;cognition;ROM;strengthening;transfer training   Clinical Decision Making Complexity (OT) problem focused assessment/low complexity   Risk & Benefits of therapy have been explained evaluation/treatment results reviewed;care plan/treatment goals reviewed;risks/benefits reviewed;current/potential barriers reviewed;participants voiced agreement with care plan;participants included;patient;son  (three sons and daughter in law present)   Clinical Impression Comments Pt presents with limited by activity tolerance, cognition, general weakness, RA symptoms, and balance deficits. Pt would benefit from skilled IP OT to address deficits to promote safe and ind return to baseline and limit further falls.   OT Total Evaluation Time   OT Eval, Low Complexity Minutes (74589) 15   Therapy Certification   Medical Diagnosis subdural hematoma   Start of Care Date 10/13/23   Certification date from 10/13/23   Certification date to 10/18/23   OT Goals   Therapy Frequency (OT) 3 times/week   OT Predicted Duration/Target Date for Goal Attainment 10/20/23   OT Goals Hygiene/Grooming;Upper Body Dressing;Lower Body Dressing;Toilet Transfer/Toileting;Cognition   OT: Hygiene/Grooming modified independent;supervision/stand-by assist;while standing  (walker)   OT: Upper Body Dressing Minimal assist   OT: Lower Body Dressing Minimal assist   OT: Toilet Transfer/Toileting Modified independent;cleaning and garment management  (grab bars/walker)   OT: Cognitive Patient/caregiver will verbalize understanding of cognitive assessment results/recommendations as needed for safe  discharge planning   Interventions   Interventions Quick Adds Self-Care/Home Management   Self-Care/Home Management   Self-Care/Home Mgmt/ADL, Compensatory, Meal Prep Minutes (54212) 33   Symptoms Noted During/After Treatment (Meal Preparation/Planning Training) fatigue   Treatment Detail/Skilled Intervention Pt being trasported to CT at arrival in early am. Upon return in later am, pt agreeable to therapy. 3 son's and daughter in law present to assist in providing detailed information during evaluation. Pt required max cues for proper use of walker this date. OT instructed pt to complete STS with walker/gait belt and CGA provided for safety. OT instructed pt to ambulate to bathroom approx 10 ft x 2 with walker/CGA and cues. Descend to toilet provided with cues for hand placement/walker use/CGA/max time. Pt ind in toilet hygiene in sitting. OT provided total assistance to initiate donning of brief, pt able to complete remainder of task in standing with briefs from knee height, with min A for balance, slight LOB noted while bending to reach and pull. Toilet transfer provided with Min A/Mod I (low toilet seat, pt has ADA toilet at assisted living). Pt able to follow 2 step commands. Hand hygiene at sink with cues to hold onto vanity due to no room for walker. Pt required cues to locate paper towel. Pt reported fatigue, OT responding by encouraging seated break. New recliner chair provided due to other chair not properly locking. Pt returned to chair and provided set up for brushing teeth. Pt required initial assistance to take off tooth paste cap due to RA, OT noting pt use B UEs for brushing teeth. Mod A and cues provided to spit and rinse mouth. Positioning provided for comfort and safety. All needs met and goals/POC reviewed with patient. Alarm on and NSTs present to assist in ordering meals.   OT Discharge Planning   OT Plan FB dressing in sitting with set up, g/h at sink, 2-3 step commands.   OT Discharge  Recommendation (DC Rec) home with assist;home with home care occupational therapy   OT Rationale for DC Rec Pt lives at the [facility] assisted living. The family members present believe she is at her baseline level of function. Pt receives assistance for IADLs and has help as needed for ADLs. Pt has recently began HH OT/PT at the [facility]. Provided pt is able to continue receiving services, pt appropriate to return.   OT Brief overview of current status CGA/walker and cues for functional ambulation   OT Equipment Needed at Discharge   (Pt has adequate equipment.)   Total Session Time   Timed Code Treatment Minutes 33   Total Session Time (sum of timed and untimed services) 48    [facility]  OUTPATIENT OCCUPATIONAL THERAPY  EVALUATION  PLAN OF TREATMENT FOR OUTPATIENT REHABILITATION  (COMPLETE FOR INITIAL CLAIMS ONLY)  Patient's Last Name, First Name, M.I.  YOB: 1940  Nazia Goldsmith                          Provider's Name  [facility] Medical Record No.  [id number]                             Onset Date:  10/12/23   Start of Care Date:  10/13/23   Type:     ___PT   _X_OT   ___SLP Medical Diagnosis:  subdural hematoma                    OT Diagnosis:  Pt presents with limited by activity tolerance, cognition, general weakness, RA symptoms, and balance deficits. Visits from SOC:  1     See note for plan of treatment, functional goals and certification details    I CERTIFY THE NEED FOR THESE SERVICES FURNISHED UNDER        THIS PLAN OF TREATMENT AND WHILE UNDER MY CARE     (Physician co-signature of this document indicates review and certification of the therapy plan).

## 2023-10-13 NOTE — PLAN OF CARE
Physical Therapy Discharge Summary    Reason for therapy discharge:    Discharged to home with home therapy.    Progress towards therapy goal(s). See goals on Care Plan in [facility] electronic health record for goal details.  Goals not met.  Barriers to achieving goals:   discharge on same date as initial evaluation.    Therapy recommendation(s):    Continued therapy is recommended.  Rationale/Recommendations:  HHPT to progress strength, balance and endurance.   Recommended Ax1 for mobility upon returning to her [facility], this was discussed with patient and sons who state they will coordinate with her facility.

## 2023-10-13 NOTE — ED NOTES
Bed: ST03  Expected date:   Expected time:   Means of arrival:   Comments:  435 83F ALC, weakness, STEMI

## 2023-10-13 NOTE — DISCHARGE SUMMARY
[facility]  Hospitalist Discharge Summary      Date of Admission:  10/12/2023  Date of Discharge:  10/13/2023  Discharging Provider: Jamil Ortiz DO  Discharge Service: Hospitalist Service    Discharge Diagnoses     Altered mental status vs baseline  Subdural hematoma  Elevated troponin  HFpEF  CAD  MR  HTN  Severe pulmonary hypertension  Microcytic anemia  RA  Peripheral arterial disease s/p vascularization  Chronic hyponatremia due to SIADH  GERD    Clinically Significant Risk Factors          Follow-ups Needed After Discharge   Follow-up Appointments     Follow-up and recommended labs and tests       Follow up with primary care provider, Torito Watts, within 7-14 days   for hospital follow- up.  No follow up labs or test are needed.    Follow up with neurosurgery in 2 weeks as recommended.  Repeat CT head per   neurosurgery          Unresulted Labs Ordered in the Past 30 Days of this Admission       Date and Time Order Name Status Description    10/12/2023  9:35 PM Urine Culture In process         These results will be followed up by PCP     Discharge Disposition   Discharged to home  Condition at discharge: Stable    Hospital Course   Nazia Goldsmith is a 83 year old female with a past medical history of rheumatoid arthritis, pulmonary hypertension, mitral regurgitation, mitral stenosis, coronary artery disease, heart failure with preserved ejection fraction presents to hospital for altered mental status.     Altered mental status?  Leukocytosis  According to her sons who are bedside she is at her baseline.  Her sons suspect that a new employee at the facility who may not be familiar with the patient likely became concerned and called EMS.  Work-up in the emergency room significant for a leukocytosis of 15,000 with no clear source of infection.  Subdural hematoma was identified on CT scan but is small at 3 mm and unlikely to be causing an altered mental status.  The patient  does have a mildly elevated troponin but it was similarly elevated in the past and this is likely chronic.  -Follow-up repeat CBC and procalcitonin in the morning  -We will monitor off of antibiotics     Subdural hematoma  Small 3 mm subdural hematoma unlikely to be causing any symptoms.  Likely from her fall approximately 2 weeks ago where she hit the left side of her head.  Repeat CT scan unchanged  - Holding DAPT until cleared by neurosurgery  - neurosurgery consulted.  Non-surgical.  Will have patient follow up in clinic     Elevated troponin  HFpEF  CAD  MR  HTN  Severe pulmonary hypertension  Weight on discharge less when she was in the hospital was 89 pounds she is currently 99 pounds, but appears euvolemic on exam. She is chest pain free. Troponin is mildly elevated and stable. EKG is similar to previous ekgs.   -Strict I's and O's, daily weights  -Hold DAPT in setting of subdural hematoma until cleared by neurosurgery  -Resume PTA metoprolol, Crestor, Aldactone, losartan and Lasix     Microcytic anemia  Hemoglobin is improved from previous labs and August.     RA  PTA meds include leflunomide and Plaquenil     Peripheral arterial disease s/p vascularization  Noted     Chronic hyponatremia due to SIADH  Hyponatremia is mild on presentation.  Monitoring     GERD  PPI    Consultations This Hospital Stay   PHYSICAL THERAPY ADULT IP CONSULT  OCCUPATIONAL THERAPY ADULT IP CONSULT  NEUROSURGERY IP CONSULT  CARE MANAGEMENT / SOCIAL WORK IP CONSULT    Code Status   No CPR- Do NOT Intubate    Time Spent on this Encounter   IJamil DO, personally saw the patient today and spent greater than 30 minutes discharging this patient.       Jamil Ortiz DO  [facility] NEUROSCIENCE UNIT  [facility] [place] [address]  Phone: [phone]  ______________________________________________________________________    Physical Exam   Vital Signs: Temp: 97.6  F (36.4  C) Temp src: Oral BP:  128/54 Pulse: 76   Resp: 16 SpO2: 96 % O2 Device: None (Room air)    Weight: 99 lbs 10.37 oz  General Appearance: Resting comfortably. NAD   Respiratory: Clear to auscultation.  No respiratory distress  Cardiovascular: RRR.  No obvious murmurs  GI: Soft. Non-distended  Skin: No obvious rashes or cyanosis  Other: Alert.  RA contractures.  No edema         Primary Care Physician   Torito Watts    Discharge Orders      Home Care Referral      Reason for your hospital stay    Small subdural hematoma (blood on brain)     Follow-up and recommended labs and tests     Follow up with primary care provider, Torito Watts, within 7-14 days for hospital follow- up.  No follow up labs or test are needed.    Follow up with neurosurgery in 2 weeks as recommended.  Repeat CT head per neurosurgery     Activity    Your activity upon discharge: activity as tolerated     Discharge Instructions    Hold aspirin and Plavix until okay to start again by neurosurgery     Diet    Follow this diet upon discharge: Orders Placed This Encounter      Regular Diet Adult       Significant Results and Procedures   Most Recent 3 CBC's:  Recent Labs   Lab Test 10/13/23  1004 10/12/23  2127 08/30/23  1546   WBC 8.0 15.3* 9.0   HGB 10.1* 10.9* 9.8*    100 103*    239 226     Most Recent 3 BMP's:  Recent Labs   Lab Test 10/13/23  1250 10/13/23  1004 10/12/23  2127   * 128* 134*   POTASSIUM 3.7 3.6 4.2   CHLORIDE 90* 91* 92*   CO2 24 21* 23   BUN 21.3 20.8 22.2   CR 0.91 0.90 0.96*   ANIONGAP 17* 16* 19*   JAMES 8.9 9.0 9.3   GLC 95 140* 95   ,   Results for orders placed or performed during the hospital encounter of 10/12/23   Head CT w/o contrast    Narrative    EXAM: CT HEAD W/O CONTRAST  LOCATION: [facility]  DATE: 10/12/2023    INDICATION: Mild confusion, on anticoagulation, bleed  COMPARISON: 07/25/2023  TECHNIQUE: Routine CT Head without IV contrast. Multiplanar reformats. Dose reduction techniques  were used.    FINDINGS:  INTRACRANIAL CONTENTS: Tiny subdural hemorrhage along the anterior left frontal lobe measuring 3 mm in thickness. No CT evidence of acute infarct. Severe presumed chronic small vessel ischemic changes. Moderate generalized volume loss. No hydrocephalus.     VISUALIZED ORBITS/SINUSES/MASTOIDS: Prior bilateral cataract surgery. Visualized portions of the orbits are otherwise unremarkable. Mild mucosal thickening scattered about the paranasal sinuses. Left mastoidectomy changes.    BONES/SOFT TISSUES: No acute abnormality.      Impression    IMPRESSION:  1.  Tiny subdural hemorrhage along the anterior left frontal lobe measuring 3 mm in thickness. No adverse intracranial mass effect.  2.  Stable generalized volume loss and sequelae of chronic microvascular ischemic disease.    Findings were discussed with Dr. Chandra at 12:00 AM on 10/13/2023.   XR Chest Port 1 View    Narrative    EXAM: XR CHEST PORT 1 VIEW  LOCATION: [facility]  DATE: 10/12/2023    INDICATION: Recent history of congestive heart failure, reassess lungs for infiltrate  COMPARISON: 07/26/2023      Impression    IMPRESSION: No evidence for CHF or pneumonia. No pleural effusion or pneumothorax. Previously seen nodular opacities are not evident currently. There is an old seventh rib fracture on the left posteriorly.   CT Head w/o Contrast    Narrative    CT SCAN OF THE HEAD WITHOUT CONTRAST   10/13/2023 10:42 AM     HISTORY: subdural hematoma    TECHNIQUE:  Axial images of the head and coronal reformations without  IV contrast material. Radiation dose for this scan was reduced using  automated exposure control, adjustment of the mA and/or kV according  to patient size, or iterative reconstruction technique.    COMPARISON: 10/12/2023.    FINDINGS: Previously noted tiny left frontal subdural hematoma  findings are intervally resolved. No new extra-axial hemorrhage  findings. Moderate chronic small vessel  ischemic changes. Moderate  global cortical volume loss with ex vacuo dilatation of the  ventricular system.    Osseous calvarium is intact.      Impression    IMPRESSION:   No acute intracranial abnormality. Previously noted left  frontal subdural hematoma findings have intervally resolved.      ROSA RIZZO MD         SYSTEM ID:  [id number]       Discharge Medications   Current Discharge Medication List        CONTINUE these medications which have NOT CHANGED    Details   acetaminophen (TYLENOL) 500 MG tablet Take 1,000 mg by mouth 3 times daily      ferrous gluconate (FERGON) 324 (38 Fe) MG tablet Take 324 mg by mouth daily      furosemide (LASIX) 40 MG tablet Take 1 tablet (40 mg) by mouth daily    Associated Diagnoses: Acute on chronic congestive heart failure, unspecified heart failure type (H)      gabapentin (NEURONTIN) 100 MG capsule Take 100 mg by mouth 2 times daily      hydroxychloroquine (PLAQUENIL) 200 MG tablet Take 200 mg by mouth daily      leflunomide (ARAVA) 20 MG tablet Take 20 mg by mouth daily      Lidocaine (LIDOCARE) 4 % Patch Place 1 patch onto the skin every 24 hours To prevent lidocaine toxicity, patient should be patch free for 12 hrs daily.      lidocaine HCl 3 % cream Apply to right shoulder area of pain twice daily as needed      loperamide (IMODIUM) 2 MG capsule Take 4 mg by mouth daily before breakfast      losartan (COZAAR) 50 MG tablet Take 1 tablet (50 mg) by mouth daily    Associated Diagnoses: Acute on chronic congestive heart failure, unspecified heart failure type (H)      metoprolol succinate ER (TOPROL XL) 50 MG 24 hr tablet Take 50 mg by mouth daily      multivitamin, therapeutic (THERA-VIT) TABS tablet Take 1 tablet by mouth daily      ondansetron (ZOFRAN ODT) 4 MG ODT tab Take 4 mg by mouth every 8 hours as needed for nausea      pantoprazole (PROTONIX) 40 MG EC tablet Take 40 mg by mouth daily      psyllium (METAMUCIL/KONSYL) capsule Take 2 capsules by mouth daily  Qty:  180 capsule, Refills: 3    Comments: Pharmacy to dispense capsule size that is available.  Associated Diagnoses: Diarrhea, unspecified type      rosuvastatin (CRESTOR) 10 MG tablet Take 1 tablet (10 mg) by mouth every evening    Associated Diagnoses: Coronary artery disease involving native heart without angina pectoris, unspecified vessel or lesion type      solifenacin (VESICARE) 5 MG tablet Take 5 mg by mouth daily      spironolactone (ALDACTONE) 25 MG tablet Take 12.5 mg by mouth daily      vitamin D3 (CHOLECALCIFEROL) 50 mcg (2000 units) tablet Take 1 tablet by mouth daily      zoledronic Acid (RECLAST) 5 MG/100ML SOLN infusion Inject 5 mg into the vein once Every year           STOP taking these medications       amLODIPine (NORVASC) 2.5 MG tablet Comments:   Reason for Stopping:         aspirin 81 MG EC tablet Comments:   Reason for Stopping:         clopidogrel (PLAVIX) 75 MG tablet Comments:   Reason for Stopping:         ibuprofen (ADVIL/MOTRIN) 600 MG tablet Comments:   Reason for Stopping:             Allergies   Allergies   Allergen Reactions    Methotrexate Unknown    Sulindac Unknown    Codeine Dizziness    Dyazide [Hydrochlorothiazide W-Triamterene] Nausea and Vomiting    Hydrocodone-Acetaminophen Nausea and Vomiting    Omeprazole Rash    Oxycodone Other (See Comments) and Dizziness     constipation    Ranitidine Rash

## 2023-10-13 NOTE — ED PROVIDER NOTES
History     Chief Complaint:  Chest Pain and Altered Mental Status     HPI   Nazia Goldsmith is a 83 year old female who presents to the ED via EMS for evaluation of chest pain and altered mental status. Per EMS, they were initially called for a complaint of chest pain, but upon their arrival, staff told them the patient had been incontinent of urine and had difficulty moving around. The staff reported the patient was confused and not at her baseline. EMS notes that the staff does not know the patient very well. En route, she was given 324 mg aspirin and 4 mg Zofran for nausea. Adds that the EKG showed mild ST elevation but they did not call it a STEMI. The patient reports she ate her meals today and has two or three meals everyday. The patient denies chest pain, headache, chest pressure, chest pain, and any other pain.     Independent Historian: EMS reports as noted above.    Review of External Notes: I reviewed notes from the patient's care facility. The patient is DNR/DNI per her POLST form. The patient lives at the [facility].     I reviewed recent admission note and discharge summary from 7/25/23. The patient was admitted for seven days due to pulmonary hypertension, mitral valve regurgitation and mitral valve stenosis. She had acute hypoxic respiratory failure requiring intubation by EMS. She also had pulmonary edema due to acute diastolic CHF.    She has history of CAD, moderate to severe LAD and RCA disease, moderate mitral regurgitation. Echocardiogram shows severe severe pulmonary hypertension, 50-60% left ventricle function, no wall motion abnormality.     She underwent a lexiscan and heart catheterization. This showed diffuse disease in LAD with several lesions from the proximal to mid portion, chronic total occlusion of the left circumflex with faint collaterals, and moderate proximal RCA disease.     Continued medical management was recommended at discharge, no interventions were performed  during the admission.    Allergies:  Methotrexate  Sulindac  Codeine  Dyazide [Hydrochlorothiazide W-Triamterene]  Hydrocodone-Acetaminophen  Omeprazole  Oxycodone  Ranitidine     Medications:    Aspirin 81 MG  Plavix  Iron  Lasix 40 mg  Gabapentin 100 BID  Plaquenil   Loperamide  Metoprolol  Losartan  Ondansetron  Pantoprazole  Crestor   Spironolactone    Past Medical and Surgical History:    Osteoarthritis  Osteoporosis  Peripheral artery disease  CHF  Pulmonary nodules  Hypoxemic respiratory failure  Duodenal ulceration  Syndrome of inappropriate ADH production  Aphasia  GERD  Gait disorder  Breast cancer  Rheumatoid arthritis  Metabolic acidosis  Hypertension   Hysterectomy  ORIF tibial plateau fracture, right  Bladder repair  Cholecystectomy  Appendectomy  Breast biopsy, right  Mastoid surgery  Breast lumpectomy, right  Cataract removal  Hemiarthroplasty hip, right  Knee arthroplasty, right  Coronary angiogram   Right heart catheterization      Social History:  [facility] at the [facility]  Presents via EMS  PCP: Torito Watts     Physical Exam   Patient Vitals for the past 24 hrs:   BP Temp Temp src Pulse Resp SpO2 Weight   10/12/23 2129 (!) 150/71 -- -- 105 13 94 % --   10/12/23 2127 (!) 150/71 99  F (37.2  C) Temporal 104 16 95 % 45.2 kg (99 lb 10.4 oz)        General: Awake, resting comfortably on the ED stretcher.  Mildly slow to answer questions.   Head:  The scalp, face, and head appear normal  Eyes:  The pupils are equal, round, and reactive to light    There is no nystagmus    Extraocular muscles are intact    Conjunctivae and sclerae are normal  ENT:    The nose is normal    Pinnae are normal    The oropharynx is normal  Neck:  Normal range of motion    There is no rigidity noted  CV:  Mildly tachycardic rate  Normal underlying rhythm     Normal S1/S2  Holosystolic murmur at the cardiac apex consistent with mitral regurgitation  Resp:  Lungs are clear    There is no tachypnea    Non-labored    No rales    No  wheezing   GI:  Abdomen is soft, there is no rigidity    No distension/tympani    No rebound tenderness     Non-surgical without peritoneal features at this time  MS:  Normal muscular tone    Symmetric motor strength    No major joint effusions    The patient has joint changes to the hand consistent with rheumatoid arthritis  Skin:  No rash or acute skin lesions noted  Neuro:  Normal and fluent speech    No motor deficits    GCS 15    She follows commands without difficulty    Motor exam in all extremities is normal  There is no receptive aphasia but there may be a mild expressive aphasia or psychomotor slowing.  Psych: Awake. Alert.  Normal affect.    Emergency Department Course   ECG  ECG taken at 2121, ECG read at 2128  Sinus tachycardia  Marked ST abnormality, possible lateral subendocardial injury    Inferior-lateral ischemia 1, AVL changes are old, ST depression in V2 is new as compared to prior, dated 7/31/23.  Rate 109 bpm. NY interval 132 ms. QRS duration 82 ms. QT/QTc 324/436 ms. P-R-T axes 29 35 136.      Imaging:  Head CT w/o contrast   Final Result   IMPRESSION:   1.  Tiny subdural hemorrhage along the anterior left frontal lobe measuring 3 mm in thickness. No adverse intracranial mass effect.   2.  Stable generalized volume loss and sequelae of chronic microvascular ischemic disease.      Findings were discussed with Dr. Chandra at 12:00 AM on 10/13/2023.      XR Chest Port 1 View   Final Result   IMPRESSION: No evidence for CHF or pneumonia. No pleural effusion or pneumothorax. Previously seen nodular opacities are not evident currently. There is an old seventh rib fracture on the left posteriorly.           Report per radiology    Laboratory:  Labs Ordered and Resulted from Time of ED Arrival to Time of ED Departure   COMPREHENSIVE METABOLIC PANEL - Abnormal       Result Value    Sodium 134 (*)     Potassium 4.2      Carbon Dioxide (CO2) 23      Anion Gap 19 (*)     Urea Nitrogen 22.2      Creatinine  0.96 (*)     GFR Estimate 58 (*)     Calcium 9.3      Chloride 92 (*)     Glucose 95      Alkaline Phosphatase 98      AST 23      ALT 13      Protein Total 7.2      Albumin 3.9      Bilirubin Total 0.3     TROPONIN T, HIGH SENSITIVITY - Abnormal    Troponin T, High Sensitivity 40 (*)    CBC WITH PLATELETS AND DIFFERENTIAL - Abnormal    WBC Count 15.3 (*)     RBC Count 3.34 (*)     Hemoglobin 10.9 (*)     Hematocrit 33.4 (*)           MCH 32.6      MCHC 32.6      RDW 14.0      Platelet Count 239      % Neutrophils 77      % Lymphocytes 9      % Monocytes 11      Mids % (Monos, Eos, Basos)        % Eosinophils 2      % Basophils 0      % Immature Granulocytes 1      NRBCs per 100 WBC 0      Absolute Neutrophils 11.9 (*)     Absolute Lymphocytes 1.3      Absolute Monocytes 1.7 (*)     Mids Abs (Monos, Eos, Basos)        Absolute Eosinophils 0.3      Absolute Basophils 0.0      Absolute Immature Granulocytes 0.1      Absolute NRBCs 0.0     ROUTINE UA WITH MICROSCOPIC REFLEX TO CULTURE - Normal    Color Urine Light Yellow      Appearance Urine Clear      Glucose Urine Negative      Bilirubin Urine Negative      Ketones Urine Negative      Specific Gravity Urine 1.012      Blood Urine Negative      pH Urine 5.0      Protein Albumin Urine Negative      Urobilinogen Urine Normal      Nitrite Urine Negative      Leukocyte Esterase Urine Negative      RBC Urine <1      WBC Urine <1      Hyaline Casts Urine 2     LACTIC ACID WHOLE BLOOD - Normal    Lactic Acid 0.8     URINE CULTURE        Procedures:  Procedures       Emergency Department Course & Assessments:             Interventions/ED Medications:  Medications   metoprolol (LOPRESSOR) injection 5 mg (5 mg Intravenous $Given 10/12/23 7950)          Independent Interpretation of Radiology Studies:  I reviewed chest x ray which is rotated due to poor inspiratory effort. Shows no focal pneumonia.    Assessments/Consultations/Discussion of Management:  ED Course as of  10/13/23 0014   Thu Oct 12, 2023   2119 I obtained history and examined the patient as noted above.    2315 I rechecked and updated the patient. The patient reports she is doing fine but feels tired.   Fri Oct 13, 2023   0000 I spoke with [facility] regarding the CT results.    12:33 AM  I spoke with Dr. Morin regarding the admission.    I paged neurosurgery to make them aware of the very tiny subdural hematoma finding and asked that they placed their recommendations in the chart.    Disposition:  Admit to the hospital  Impression & Plan        Medical Decision Making:    This patient presents to the emergency department with concerns for possible altered mental status.  There was an initial concern for possible chest pain but when medics arrived the patient had been incontinent of urine and was mildly weak.  The staff noted that she did not seem entirely at her baseline and was mildly confused.  The patient does have a significant history of coronary artery disease and underwent an extensive evaluation in the hospital in July of this year.  See notations above.  Patient's clinical examination was largely nonacute.  She underwent a detailed toxic metabolic evaluation that was essentially negative.  CT scan of the brain does show a small subdural hematoma which appears new compared with her CT scan on file from July 25 of this year.  The patient does take aspirin and Plavix at baseline.      Diagnosis:    ICD-10-CM    1. Subdural hematoma (H)  S06.5XAA          Comorbidity: Multivessel coronary artery disease.      Kulwinder Chandra MD  10/12/2023   Kulwinder Chandra MD Rock, Michael P, MD  10/13/23 0035

## 2023-10-13 NOTE — CONSULTS
Care Management Initial Consult    General Information  Assessment completed with: Care Team Member, JAY-chart review, Other, nurse at [facility]  Type of CM/SW Visit: Offer D/C Planning    Primary Care Provider verified and updated as needed:     Readmission within the last 30 days:        Reason for Consult: discharge planning  Advance Care Planning: Advance Care Planning Reviewed: no concerns identified          Communication Assessment  Patient's communication style: spoken language (English or Bilingual)             Cognitive  Cognitive/Neuro/Behavioral: (P) .WDL except  Level of Consciousness: (P) alert  Arousal Level: (P) opens eyes spontaneously  Orientation: (P) oriented x 4  Mood/Behavior: (P) cooperative, calm  Best Language: (P) 0 - No aphasia  Speech: (P) clear    Living Environment:   People in home: facility resident     Current living Arrangements: assisted living  Name of Facility: [facility]   Able to return to prior arrangements: yes       Family/Social Support:  Care provided by: self, homecare agency, other (see comments)  Provides care for: no one  Marital Status:              Description of Support System:           Current Resources:   Patient receiving home care services: Yes     Community Resources: Home Care  Equipment currently used at home:    Supplies currently used at home:      Employment/Financial:  Employment Status: retired        Financial Concerns:             Does the patient's insurance plan have a 3 day qualifying hospital stay waiver?  No    Lifestyle & Psychosocial Needs:  Social Determinants of Health     Food Insecurity: Not on file   Depression: Not on file   Housing Stability: Not on file   Tobacco Use: Not on file   Financial Resource Strain: Not on file   Alcohol Use: Not on file   Transportation Needs: Not on file   Physical Activity: Not on file   Interpersonal Safety: Not on file   Stress: Not on file   Social Connections: Not on file       Functional Status:  Prior to  admission patient needed assistance:   Dependent ADLs:: Ambulation-walker, Bathing, Dressing, Grooming  Dependent IADLs:: Cooking, Cleaning, Laundry, Shopping, Meal Preparation, Medication Management, Transportation       Mental Health Status:          Chemical Dependency Status:                Values/Beliefs:  Spiritual, Cultural Beliefs, Moravian Practices, Values that affect care:                 Additional Information:  Pt lives at [facility] CC talked with Nurse Kendra about Pt's services    Main Number: [phone]  Nurse Number: [phone]  Fax: [phone]  New Meds to [facility]    Pt lives in [facility].  Receives help with Medication management, Am/PM assist with dressing/grooming, escorts.  Pt at baseline is indep in mobility with walker.  Pt is open to [facility] PT/OT (unsure of agency).  Pt can return at discharge if at baseline.  New meds to [facility] (entered in epic).  Pt can return today/Saturday.  Kendra notes they have no nurse on Sunday.     CC spoke with patient and 2 sons at bedside.  They verify above services and state Pt gets [facility] PT/OT from [facility] (next visit on 18th).  Pt has Follow-up scheduled with Cardiology Nov 17th and PCP sees in [place].    Sons hopeful for return to [facility].  They can transport at discharge.     CC called [facility] and verrified open to [facility] RN/PT/OT.  They can resume at discharge. Need resumption order.     CC to follow for discharge planning back to [facility].      Felicia Ortega RN

## 2023-10-13 NOTE — PLAN OF CARE
Pt here with small SDH. A&Ox2; disoriented to situation and time. Speech is slightly slow. Sun'aq. Confusion and generalized weakness. RUE and bilateral hands contracted. VSS on RA- SBP <150. reg diet, thin liquids. Takes pills whole one at a time. Not OOB- waiting for therapies. PRN Tylenol given for arthritis pain. PIV, SL. Cont/incontinent, purewick placed.  Plan for neurosurgery consult, and PT/OT, and repeat head CT. Discharge pending.

## 2023-10-13 NOTE — PLAN OF CARE
Goal Outcome Evaluation:      Plan of Care Reviewed With: patient    Overall Patient Progress: improvingOverall Patient Progress: improving         Neuros intact, pt denies pain, Assist 1 GB & walker, AVS reviewed with pt and son, discharge back to assisted living and packet given.

## 2023-10-13 NOTE — PROGRESS NOTES
RECEIVING UNIT ED HANDOFF REVIEW    ED Nurse Handoff Report was reviewed by: Vianney Drummond RN on October 13, 2023 at 2:29 AM

## 2023-10-13 NOTE — PROGRESS NOTES
-diagnostic tests and consults completed and resulted - No  -vital signs normal or at patient baseline - Yes

## 2023-10-13 NOTE — PROGRESS NOTES
Contacted regarding 84 yo female anti-coagulated on Plavix and Aspirin presenting to ER from NH for confusion.  Head CT with tiny SDH.      IMPRESSION:  1.  Tiny subdural hemorrhage along the anterior left frontal lobe measuring 3 mm in thickness. No adverse intracranial mass effect.  2.  Stable generalized volume loss and sequelae of chronic microvascular ischemic disease.    RECOMMENDATIONS:  Hold ASA and Plavix.  Admit for observation.  Repeat head CT in morning around 6 AM  Every 2 hour neuro checks overnight.  Maintain BP less than 150  NS will see in consultation in morning.    LORENZO Crowell  [facility] Neurosurgery  [address] [address]    Tel [phone]  Pager [phone]

## 2023-10-13 NOTE — CONSULTS
[facility]    Neurosurgery Consultation     Date of Admission:  10/12/2023  Date of Consult (When I saw the patient): 10/13/23    Chief Complaint   Altered mental status and chest pain    History of Present Illness   History is obtained from the patient and the patient's chart.    Nazia Goldsmith is a 83 year old female who presented to the ED 10/12/23 via EMS for evaluation of chest pain and altered mental status. The staff members at her living facility reported she was confused and not at baseline. However, her sons state the patient is at baseline, and that it may have been a new employee at the facility that was not familiar with the patient who called EMS.    Assessment & Plan   Nazia Goldsmith is a 83 year old female who was admitted on 10/12/2023. I was asked to see the patient for subdural hematoma. The patient denies headache, nausea, dizziness, or new symptoms. Neuro exam is stable and intact, with generalized LE weakness.     Rpt head CT 10/13:  IMPRESSION:   No acute intracranial abnormality. Previously noted left  frontal subdural hematoma findings have intervally resolved.    Plan:  -No surgery recommended at this time   -Follow up with NSGY clinic as needed   -Appreciate assistance from specialties     I have discussed the following assessment and plan with Dr. Robins who is in agreement with the initial plan and will follow up with further consultation recommendations.    Courtney Mcleod DNP Student  [facility]     I agree with note and was present for exam.     Lesley Tavarez CNP  [facility]  [address] [place] [id number]  Tel [phone]  Pager [phone]      Code Status    No CPR- Do NOT Intubate    Reason for Consult   Reason for consult: I was asked by Dr. Morin to evaluate this patient for subdural hematoma.    Primary Care Physician   Torito Watts    Past Medical  History   I have reviewed this patient's medical history and updated it with pertinent information if needed.   -rheumatoid arthritis  -pulmonary hypertension  -mitral regurgitation  -mitral stenosis  -coronary artery disease  -HFpEF    Past Surgical History   I have reviewed this patient's surgical history and updated it with pertinent information if needed.  Past Surgical History:   Procedure Laterality Date    CV CORONARY ANGIOGRAM N/A 7/31/2023    Procedure: Coronary Angiogram;  Surgeon: Sharmila Lombardo MD;  Location:  HEART CARDIAC CATH LAB    CV RIGHT HEART CATH MEASUREMENTS RECORDED N/A 7/31/2023    Procedure: Right Heart Catheterization;  Surgeon: Sharmila Lombardo MD;  Location:  HEART CARDIAC CATH LAB       Prior to Admission Medications   Prior to Admission Medications   Prescriptions Last Dose Informant Patient Reported? Taking?   acetaminophen (TYLENOL) 500 MG tablet   Yes No   Sig: Take 1,000 mg by mouth 3 times daily   amLODIPine (NORVASC) 2.5 MG tablet   Yes No   Sig: Take 2.5 mg by mouth daily   aspirin 81 MG EC tablet   Yes No   Sig: Take 81 mg by mouth daily   clopidogrel (PLAVIX) 75 MG tablet   Yes No   Sig: Take 75 mg by mouth every evening   furosemide (LASIX) 40 MG tablet   No No   Sig: Take 1 tablet (40 mg) by mouth daily   gabapentin (NEURONTIN) 100 MG capsule   Yes No   Sig: Take 100 mg by mouth 2 times daily   hydroxychloroquine (PLAQUENIL) 200 MG tablet   Yes No   Sig: Take 200 mg by mouth every evening   ibuprofen (ADVIL/MOTRIN) 600 MG tablet   Yes No   Sig: Take 600 mg by mouth every 6 hours as needed for moderate pain   leflunomide (ARAVA) 20 MG tablet   Yes No   Sig: Take 20 mg by mouth every evening   lidocaine HCl 3 % cream   Yes No   Sig: Apply to right shoulder area of pain twice daily as needed   loperamide (IMODIUM) 2 MG capsule   Yes No   Sig: Take 4 mg by mouth daily before breakfast   losartan (COZAAR) 50 MG tablet   No No   Sig: Take 1 tablet (50 mg) by  mouth daily   metoprolol succinate ER (TOPROL XL) 50 MG 24 hr tablet   Yes No   Sig: Take 50 mg by mouth every evening   multivitamin, therapeutic (THERA-VIT) TABS tablet   Yes No   Sig: Take 1 tablet by mouth daily   ondansetron (ZOFRAN ODT) 4 MG ODT tab   Yes No   Sig: Take 4 mg by mouth every 8 hours as needed for nausea   pantoprazole (PROTONIX) 40 MG EC tablet   Yes No   Sig: Take 40 mg by mouth daily   psyllium (METAMUCIL/KONSYL) capsule   No No   Sig: Take 2 capsules by mouth daily   rosuvastatin (CRESTOR) 10 MG tablet   No No   Sig: Take 1 tablet (10 mg) by mouth every evening   solifenacin (VESICARE) 5 MG tablet   Yes No   Sig: Take 5 mg by mouth every evening   spironolactone (ALDACTONE) 25 MG tablet   Yes No   Sig: Take 12.5 mg by mouth daily   vitamin D3 (CHOLECALCIFEROL) 50 mcg (2000 units) tablet   Yes No   Sig: Take 1 tablet by mouth daily   zoledronic Acid (RECLAST) 5 MG/100ML SOLN infusion   Yes No   Sig: Inject 5 mg into the vein once Every year      Facility-Administered Medications: None     Allergies   Allergies   Allergen Reactions    Methotrexate Unknown    Sulindac Unknown    Codeine Dizziness    Dyazide [Hydrochlorothiazide W-Triamterene] Nausea and Vomiting    Hydrocodone-Acetaminophen Nausea and Vomiting    Omeprazole Rash    Oxycodone Other (See Comments) and Dizziness     constipation    Ranitidine Rash       Social History   I have reviewed this patient's social history and updated it with pertinent information if needed. Nazia Goldsmith      Family History   I have reviewed this patient's family history and updated it with pertinent information if needed.   No family history on file.    Review of Systems   ROS is negative except as noted above.     Physical Exam   Temp: 97.6  F (36.4  C) Temp src: Oral BP: 120/52 Pulse: 70   Resp: 14 SpO2: 95 % O2 Device: None (Room air)    Vital Signs with Ranges  Temp:  [97.6  F (36.4  C)-99  F (37.2  C)] 97.6  F (36.4  C)  Pulse:  [] 70  Resp:   [12-16] 14  BP: ()/(44-71) 120/52  SpO2:  [92 %-97 %] 95 %  99 lbs 10.37 oz     , Blood pressure 120/52, pulse 70, temperature 97.6  F (36.4  C), temperature source Oral, resp. rate 14, weight 45.2 kg (99 lb 10.4 oz), SpO2 95%.  99 lbs 10.37 oz    NEUROLOGICAL EXAMINATION:   Mental status:  Alert and Oriented x 3, speech is fluent.  Cranial nerves:  II-XII intact.   Motor:  KEYES appropriately. BLE generalized weakness appreciated.   Sensation:  Intact  Reflexes:    Negative Clonus.    Coordination:  Smooth finger to nose testing.   Negative pronator drift.     Data   All new lab and imaging data was personally reviewed by me.  Narrative & Impression   CT SCAN OF THE HEAD WITHOUT CONTRAST   10/13/2023 10:42 AM      HISTORY: subdural hematoma     TECHNIQUE:  Axial images of the head and coronal reformations without  IV contrast material. Radiation dose for this scan was reduced using  automated exposure control, adjustment of the mA and/or kV according  to patient size, or iterative reconstruction technique.     COMPARISON: 10/12/2023.     FINDINGS: Previously noted tiny left frontal subdural hematoma  findings are intervally resolved. No new extra-axial hemorrhage  findings. Moderate chronic small vessel ischemic changes. Moderate  global cortical volume loss with ex vacuo dilatation of the  ventricular system.     Osseous calvarium is intact.                                                                      IMPRESSION:   No acute intracranial abnormality. Previously noted left  frontal subdural hematoma findings have intervally resolved.

## 2023-10-13 NOTE — PHARMACY-ADMISSION MEDICATION HISTORY
Pharmacist Admission Medication History    Admission medication history is complete. The information provided in this note is only as accurate as the sources available at the time of the update.    Information Source(s):  Medication list sent with patient from [facility] on [facility] ([phone])    Changes made to PTA medication list:  Added: Lidocaine patch  Deleted: Amlodipine  Changed: None     Allergies reviewed with patient and updates made in EHR: no - already reviewed by MD    Medication History Completed By: Marisol Berger, PharmD, BCPS    PTA Med List   Medication Sig Note Last Dose    acetaminophen (TYLENOL) 500 MG tablet Take 1,000 mg by mouth 3 times daily 10/13/2023: 0800; 1400; 2000     aspirin 81 MG EC tablet Take 81 mg by mouth daily 10/13/2023: 1400     clopidogrel (PLAVIX) 75 MG tablet Take 75 mg by mouth daily 10/13/2023: 0930     ferrous gluconate (FERGON) 324 (38 Fe) MG tablet Take 324 mg by mouth daily 10/13/2023: 2000     furosemide (LASIX) 40 MG tablet Take 1 tablet (40 mg) by mouth daily 10/13/2023: 0930     gabapentin (NEURONTIN) 100 MG capsule Take 100 mg by mouth 2 times daily 10/13/2023: 0930; 2000     hydroxychloroquine (PLAQUENIL) 200 MG tablet Take 200 mg by mouth daily 10/13/2023: 0930     ibuprofen (ADVIL/MOTRIN) 600 MG tablet Take 600 mg by mouth every 6 hours as needed for moderate pain      leflunomide (ARAVA) 20 MG tablet Take 20 mg by mouth daily 10/13/2023: 0930     Lidocaine (LIDOCARE) 4 % Patch Place 1 patch onto the skin every 24 hours To prevent lidocaine toxicity, patient should be patch free for 12 hrs daily. 10/13/2023: 0800     lidocaine HCl 3 % cream Apply to right shoulder area of pain twice daily as needed      loperamide (IMODIUM) 2 MG capsule Take 4 mg by mouth daily before breakfast 10/13/2023: 0800     losartan (COZAAR) 50 MG tablet Take 1 tablet (50 mg) by mouth daily 10/13/2023: 0930     metoprolol succinate ER (TOPROL XL) 50 MG 24 hr tablet Take 50 mg by mouth  daily 10/13/2023: 0930     multivitamin, therapeutic (THERA-VIT) TABS tablet Take 1 tablet by mouth daily 10/13/2023: 1400     ondansetron (ZOFRAN ODT) 4 MG ODT tab Take 4 mg by mouth every 8 hours as needed for nausea      pantoprazole (PROTONIX) 40 MG EC tablet Take 40 mg by mouth daily 10/13/2023: 0800     psyllium (METAMUCIL/KONSYL) capsule Take 2 capsules by mouth daily 10/13/2023: 0800     rosuvastatin (CRESTOR) 10 MG tablet Take 1 tablet (10 mg) by mouth every evening 10/13/2023: 2000     solifenacin (VESICARE) 5 MG tablet Take 5 mg by mouth daily 10/13/2023: 0930     spironolactone (ALDACTONE) 25 MG tablet Take 12.5 mg by mouth daily 10/13/2023: 0930     vitamin D3 (CHOLECALCIFEROL) 50 mcg (2000 units) tablet Take 1 tablet by mouth daily 10/13/2023: 1400     zoledronic Acid (RECLAST) 5 MG/100ML SOLN infusion Inject 5 mg into the vein once Every year

## 2023-10-13 NOTE — ED NOTES
[facility]  ED Nurse Handoff Report    ED Chief complaint: Chest Pain and Altered Mental Status      ED Diagnosis:   Final diagnoses:   Subdural hematoma (H)       Code Status: Admitting MD to discuss    Allergies:   Allergies   Allergen Reactions    Methotrexate Unknown    Sulindac Unknown    Codeine Dizziness    Dyazide [Hydrochlorothiazide W-Triamterene] Nausea and Vomiting    Hydrocodone-Acetaminophen Nausea and Vomiting    Omeprazole Rash    Oxycodone Other (See Comments) and Dizziness     constipation    Ranitidine Rash       Patient Story: Patient presents to ER via EMS. Per report, patient comes from [facility]. Nursing staff called EMS for reports of increased confusion which was abnormal for patients baseline. When EMS was moving patient, patient endorsed chest pain. Monitor for them showed NSTEMI. 324 of Asprin and 4 mg of Zofran given in route.    Focused Assessment:  A & Ox4.Family reports increased confusion      To be done/followed up on inpatient unit:  Admitting MD orders    Does this patient have any cognitive concerns?: Forgetful    Activity level - Baseline/Home:  Unknown  Activity Level - Current:   Unknown    Patient's Preferred language: English   Needed?: No    Isolation: None  Infection: Not Applicable  Patient tested for COVID 19 prior to admission: NO  Bariatric?: No    Vital Signs:   Vitals:    10/12/23 2127 10/12/23 2129 10/13/23 0030 10/13/23 0045   BP: (!) 150/71 (!) 150/71 105/44 106/52   Pulse: 104 105 81 82   Resp: 16 13 15 12   Temp: 99  F (37.2  C)      TempSrc: Temporal      SpO2: 95% 94% 93% 95%   Weight: 45.2 kg (99 lb 10.4 oz)          Cardiac Rhythm:     Was the PSS-3 completed:   Yes  What interventions are required if any?               Family Comments: family at bedside  OBS brochure/video discussed/provided to patient/family: No                  For the majority of the shift this patient's behavior was Green.   Behavioral  interventions performed were frequent rounding.    ED NURSE PHONE NUMBER: *[phone]

## 2023-10-13 NOTE — H&P
[facility]    History and Physical  Hospitalist     Date of Admission:  10/12/2023    Assessment & Plan   Nazia Goldsmith is a 83 year old female with a past medical history of rheumatoid arthritis, pulmonary hypertension, mitral regurgitation, mitral stenosis, coronary artery disease, heart failure with preserved ejection fraction presents to hospital for altered mental status.    Altered mental status?  Leukocytosis  According to her sons who are bedside she is at her baseline.  Her sons suspect that a new employee at the facility who may not be familiar with the patient likely became concerned and called EMS.  Work-up in the emergency room significant for a leukocytosis of 15,000 with no clear source of infection.  Subdural hematoma was identified on CT scan but is small at 3 mm and unlikely to be causing an altered mental status.  The patient does have a mildly elevated troponin but it was similarly elevated in the past and this is likely chronic.  -Follow-up repeat CBC and procalcitonin in the morning  -We will monitor off of antibiotics    Subdural hematoma  Small 3 mm subdural hematoma unlikely to be causing any symptoms.  Likely from her fall approximately 2 weeks ago where she hit the left side of her head.  -Hold antiplatelets  -Avoid any anticoagulation  -Follow-up repeat CT in the morning  -Follow-up neurosurgery consult  -Neurochecks every 2  -SBP goal less than 150    Elevated troponin  HFpEF  CAD  MR  HTN  Severe pulmonary hypertension  Weight on discharge less when she was in the hospital was 89 pounds she is currently 99 pounds, but appears euvolemic on exam. She is chest pain free. Troponin is mildly elevated and stable. EKG is similar to previous ekgs.   -Strict I's and O's, daily weights  -Hold DAPT in setting of subdural hematoma until cleared by neurosurgery  -Resume PTA metoprolol, Crestor, Aldactone, losartan and Lasix    Microcytic anemia  Hemoglobin is improved  from previous labs and August.    RA  PTA meds include leflunomide and Plaquenil    Peripheral arterial disease s/p vascularization  Noted    Chronic hyponatremia due to SIADH  Hyponatremia is mild on presentation.  Monitoring    GERD  PPI    DVT ppx: scd  Expected length of stay less than 2 days  Code Status: full code      Clinically Significant Risk Factors Present on Admission             # Anion Gap Metabolic Acidosis: Highest Anion Gap = 19 mmol/L in last 2 days, will monitor and treat as appropriate    # Drug Induced Platelet Defect: home medication list includes an antiplatelet medication   # Hypertension: Home medication list includes antihypertensive(s)  # Chronic heart failure with preserved ejection fraction: heart failure noted on problem list and last echo with EF >50%                    Primary Care Physician   Torito Watts    Chief Complaint   Chest Pain and Altered Mental Status    History obtained from the patient and the medical chart    History of Present Illness   Nazia Goldsmith is a 83 year old female with a past medical history of rheumatoid arthritis, pulmonary hypertension, mitral regurgitation, mitral stenosis, coronary artery disease, heart failure with preserved ejection fraction presents to hospital for altered mental status.  History is somewhat confusing because the patient herself has no complaints.  According to the chart EMS were called to the patient's facility for suspected altered mental status and chest pain.  Of note the patient has been having positional chest pain intermittently for the last 2 weeks since she had a fall where she hit the left side of her head.  Additionally staff at the facility reported the patient had urinary incontinence and was having difficulty ambulating.  It was noted by EMS that the staff at the patient's facility were not familiar with the patient.  When EMS evaluated the patient and performed an EKG they became concerned for a ST elevation MI  and give the patient aspirin and brought her to the emergency room.  In the emergency room the patient's EKG was evaluated and although it is abnormal it is unchanged from her previous EKGs.  The patient is chest pain-free.  And her troponin remained stable and not at its baseline for 2 checks.  Work-up for altered mental status was initiated in the emergency room and did not reveal any clear source for confusion.  The patient's 2 sons came to the emergency room and they found her mother to be near her baseline.  They suspect that the staff members at the patient's facility may have had a lower threshold to call EMS due to the patient's recent fall and because they were not familiar with the patient.  The patient had a fall approximately 2 weeks ago where she had the left side of her head.  Since then she has been having intermittent positional chest pain which was evaluated by the provider at her facility and determined to be musculoskeletal pain.  Imaging in the emergency room revealed a small subdural hematoma measuring 3 mm.  Patient denies any headaches or neurologic symptoms.  Presumably her fall 2 weeks ago was the cause of her subdural hematoma but she was admitted to observation for a repeat CT scan in the morning to ensure that the subdural is stable.    Past Medical History    I have reviewed this patient's medical history and updated it with pertinent information if needed.   No past medical history on file.    Past Surgical History   I have reviewed this patient's surgical history and updated it with pertinent information if needed.  Past Surgical History:   Procedure Laterality Date    CV CORONARY ANGIOGRAM N/A 7/31/2023    Procedure: Coronary Angiogram;  Surgeon: Sharmila Lombardo MD;  Location: [facility] CARDIAC CATH LAB    CV RIGHT HEART CATH MEASUREMENTS RECORDED N/A 7/31/2023    Procedure: Right Heart Catheterization;  Surgeon: Sharmila Lombardo MD;  Location: [facility] CARDIAC CATH LAB        Allergies   Allergies   Allergen Reactions    Methotrexate Unknown    Sulindac Unknown    Codeine Dizziness    Dyazide [Hydrochlorothiazide W-Triamterene] Nausea and Vomiting    Hydrocodone-Acetaminophen Nausea and Vomiting    Omeprazole Rash    Oxycodone Other (See Comments) and Dizziness     constipation    Ranitidine Rash       Social History   I have reviewed this patient's social history and updated it with pertinent information if needed. Nazia HESS Agus      Family History   I have reviewed this patient's family history and updated it with pertinent information if needed.   No family history on file.    Review of Systems   The patient denies any complaints and is not sure why she is in the hospital.    Physical Exam   Temp: 99  F (37.2  C) Temp src: Temporal BP: (!) 150/71 Pulse: 105   Resp: 13 SpO2: 94 % O2 Device: None (Room air)    Vital Signs with Ranges  Temp:  [99  F (37.2  C)] 99  F (37.2  C)  Pulse:  [104-105] 105  Resp:  [13-16] 13  BP: (150)/(71) 150/71  SpO2:  [94 %-95 %] 94 %  99 lbs 10.37 oz  Physical Exam  Vitals reviewed.   Constitutional:       Appearance: Normal appearance.      Comments: Frail elderly lady seen sitting in bed in no apparent distress.   HENT:      Head: Normocephalic and atraumatic.   Eyes:      Extraocular Movements: Extraocular movements intact.      Conjunctiva/sclera: Conjunctivae normal.      Pupils: Pupils are equal, round, and reactive to light.   Cardiovascular:      Rate and Rhythm: Normal rate and regular rhythm.      Pulses: Normal pulses.      Heart sounds: Normal heart sounds. No murmur heard.  Pulmonary:      Effort: Pulmonary effort is normal.      Breath sounds: Normal breath sounds. No wheezing, rhonchi or rales.   Abdominal:      General: Abdomen is flat. Bowel sounds are normal.      Palpations: Abdomen is soft.   Musculoskeletal:         General: No swelling. Normal range of motion.   Skin:     General: Skin is warm and dry.   Neurological:       General: No focal deficit present.      Mental Status: She is alert. Mental status is at baseline.      Cranial Nerves: No cranial nerve deficit.      Comments: She is alert and oriented.  She is slow to answer some questions but knew the season, year, and place.  She was able to identify her sons by name.           Medical Decision Making       75 MINUTES SPENT BY ME on the date of service doing chart review, history, exam, documentation & further activities per the note.

## 2023-10-13 NOTE — PROGRESS NOTES
10/13/23 5851   Appointment Info   Signing Clinician's Name / Credentials (PT) Saida Diego, PT, DPT   Quick Adds   Quick Adds Certification   Living Environment   People in Home alone   Current Living Arrangements assisted living   Home Accessibility no concerns   Transportation Anticipated family or friend will provide;agency   Living Environment Comments Patient lives at [facility].   Self-Care   Usual Activity Tolerance moderate   Current Activity Tolerance fair   Regular Exercise Yes   Activity/Exercise Type other (see comments)  (HHPT/OT)   Equipment Currently Used at Home walker, rolling;shower chair;grab bar, tub/shower;grab bar, toilet;raised toilet seat;other (see comments)  (life alert)   Fall history within last six months yes   Number of times patient has fallen within last six months 1   Activity/Exercise/Self-Care Comment Patient reports she ambulates with a 4WW in her apartment and down to the dining room for meals. She can call for assist if she needs it at her facility, always gets assist with showering.   General Information   Onset of Illness/Injury or Date of Surgery 10/12/23   Referring Physician Fausto Morin MD   Patient/Family Therapy Goals Statement (PT) to go home   Pertinent History of Current Problem (include personal factors and/or comorbidities that impact the POC) Patient is 82 YO F who presented to the hospital from her [facility] for altered mental status. She was found to have chronic SDH from a fall 2 weeks ago, no acute changes per head CT reports. Patient with history of  rheumatoid arthritis, pulmonary hypertension, mitral regurgitation, mitral stenosis, coronary artery disease, and heart failure with preserved ejection fraction.   Existing Precautions/Restrictions fall   General Observations Patient sitting up in chair, multiple family members present.   Cognition   Affect/Mental Status (Cognition) WFL   Orientation Status (Cognition) oriented to;place   Follows  Commands (Cognition) WFL   Pain Assessment   Patient Currently in Pain Yes, see Vital Sign flowsheet   Integumentary/Edema   Integumentary/Edema no deficits were identifed   Posture    Posture Forward head position;Protracted shoulders;Kyphosis   Range of Motion (ROM)   Range of Motion ROM is WFL   Strength (Manual Muscle Testing)   Strength (Manual Muscle Testing) Deficits observed during functional mobility   Strength Comments Functional weakness noted during transfers   Bed Mobility   Comment, (Bed Mobility) Patient declining getting into bed during evaluation. Bed mobility not observed.   Transfers   Transfers sit-stand transfer   Sit-Stand Transfer   Sit-Stand Benson (Transfers) contact guard   Comment, (Sit-Stand Transfer) From recliner chair   Gait/Stairs (Locomotion)   Benson Level (Gait) contact guard   Assistive Device (Gait) walker, front-wheeled   Comment, (Gait/Stairs) Patient ambulated 10' in room with 4WW, forward flexed posture and mild unsteadiness.   Balance   Balance Comments Good sitting balance on EOB eating lunch; Needs support of 4WW in standing   Sensory Examination   Sensory Perception patient reports no sensory changes   Clinical Impression   Criteria for Skilled Therapeutic Intervention Yes, treatment indicated   PT Diagnosis (PT) Impaired mobility   Influenced by the following impairments Impaired balance, weakness, decresaed activity tolerance   Functional limitations due to impairments Increased difficulty with functional transfers, ambulation   Clinical Presentation (PT Evaluation Complexity) stable   Clinical Presentation Rationale Medical status, clinical judgement   Clinical Decision Making (Complexity) low complexity   Planned Therapy Interventions (PT) balance training;bed mobility training;gait training;patient/family education;strengthening;transfer training;progressive activity/exercise   Risk & Benefits of therapy have been explained evaluation/treatment results  reviewed;care plan/treatment goals reviewed;risks/benefits reviewed;current/potential barriers reviewed;participants voiced agreement with care plan;participants included;patient;son   PT Total Evaluation Time   PT Eval, Low Complexity Minutes (44232) 14   Therapy Certification   Start of care date 10/13/23   Certification date from 10/13/23   Certification date to 10/19/23   Medical Diagnosis Subdural Hematoma   Physical Therapy Goals   PT Frequency 3x/week   PT Predicted Duration/Target Date for Goal Attainment 10/19/23   PT Goals Bed Mobility;Transfers;Gait   PT: Bed Mobility Independent;Supine to/from sit   PT: Transfers Modified independent;Sit to/from stand;Bed to/from chair;Assistive device  (4WW)   PT: Gait Supervision/stand-by assist;100 feet;Assistive device  (4WW)   Interventions   Interventions Quick Adds Gait Training;Therapeutic Activity   Therapeutic Activity   Therapeutic Activities: dynamic activities to improve functional performance Minutes (32094) 5   Symptoms Noted During/After Treatment Fatigue   Treatment Detail/Skilled Intervention Skilled instruction on safe hand placements during transfers, sit <> stand with 4WW and CGA. After ambulation, patient transferred onto EOB, poor eccentric control and parked her walker off to the side. Education on safety with walker management when approaching EOB, demo provided. Discussed recommendations for increased services when she returns to her [facility], her son reports she can have Ax1 at her apartment. Patient sitting on EOB family present at end of session.   Gait Training   Gait Training Minutes (20570) 10   Symptoms Noted During/After Treatment (Gait Training) fatigue   Treatment Detail/Skilled Intervention Patient ambulated ~ 140' total distance with 4WW and CGA. Increased unsteadiness as patient fatigue but no overt losses of balance. Shuffling gait, improved with cues for taking bigger steps but corrections not sustained. Patient fatigued following gait  in hallway. Discussed with patient and sons having escort to meals at home due to unsteadiness observed as she fatigue.   PT Discharge Planning   PT Plan Review 4WW safety, ambulating in hallway, transfers   PT Discharge Recommendation (DC Rec) home with assist;home with home care physical therapy;Transitional Care Facility   PT Rationale for DC Rec Patient is slightly below baseline level of independence with mobility requiring CGA for safety when ambulating. Per discussion with son, patient is able to get increased assist at her skilled nursing for Ax1 when ambulating as needed and already has assist with ADLs. Recommend HHPT to progress strength, endurance and balance.   PT Brief overview of current status CGA for transfers and gait with 4WW   PT Equipment Needed at Discharge   (Has 4WW at home)   Total Session Time   Timed Code Treatment Minutes 15   Total Session Time (sum of timed and untimed services) 29     [facility]  OUTPATIENT PHYSICAL THERAPY EVALUATION  PLAN OF TREATMENT FOR OUTPATIENT REHABILITATION  (COMPLETE FOR INITIAL CLAIMS ONLY)  Patient's Last Name, First Name, M.I.  YOB: 1940  Nazia Goldsmith                        Provider's Name  [facility] Medical Record No.  [id number]                             Onset Date:  10/12/23   Start of Care Date:  10/13/23   Type:     _X_PT   ___OT   ___SLP Medical Diagnosis:  Subdural Hematoma              PT Diagnosis:  Impaired mobility Visits from SOC:  1     See note for plan of treatment, functional goals and certification details    I CERTIFY THE NEED FOR THESE SERVICES FURNISHED UNDER        THIS PLAN OF TREATMENT AND WHILE UNDER MY CARE     (Physician co-signature of this document indicates review and certification of the therapy plan).

## 2023-10-14 NOTE — PROGRESS NOTES
Connected Care Resource Center    Background: Transitional Care Management program identified per system criteria and reviewed by [facility] Resource Center team for possible outreach.    Assessment: Upon chart review, [facility] Team member will not proceed with patient outreach related to this episode of Transitional Care Management program due to reason below:    Patient has discharged to a Memory Care, Long-term Care, Assisted Living or Group Home where patient is receiving on-site support with their daily cares, including support with hospital follow up plan.    Plan: Transitional Care Management episode addressed appropriately per reason noted above.      Amparo Luis MA  Connected Care Resource Center, [facility]    *Connected Care Resource Team does NOT follow patient ongoing. Referrals are identified based on internal discharge reports and the outreach is to ensure patient has an understanding of their discharge instructions.

## 2023-10-16 NOTE — PROGRESS NOTES
Occupational Therapy Discharge Summary    Reason for therapy discharge:    Discharged to home with home therapy.    Progress towards therapy goal(s). See goals on Care Plan in [facility] electronic health record for goal details.  Goals partially met.  Barriers to achieving goals:   discharge from facility.    Therapy recommendation(s):    Continued therapy is recommended.  Rationale/Recommendations:  home to [facility] with services as previous and HH therapy to increase function for ADL/ IADL.

## 2023-10-21 PROBLEM — N12 PYELONEPHRITIS: Status: ACTIVE | Noted: 2023-01-01

## 2023-10-21 PROBLEM — J98.4 PNEUMONITIS: Status: ACTIVE | Noted: 2023-01-01

## 2023-10-21 PROBLEM — G93.41 METABOLIC ENCEPHALOPATHY: Status: ACTIVE | Noted: 2023-01-01

## 2023-10-21 NOTE — ED NOTES
[facility]  ED Nurse Handoff Report    ED Chief complaint: Altered Mental Status      ED Diagnosis:   Final diagnoses:   Pyelonephritis   Metabolic encephalopathy       Code Status: hospitalist to address    Allergies:   Allergies   Allergen Reactions    Hydrocodone     Methotrexate Unknown    Sulindac Unknown    Triamterene Nausea    Codeine Dizziness    Dyazide [Hydrochlorothiazide W-Triamterene] Nausea and Vomiting    Hydrocodone-Acetaminophen Nausea and Vomiting    Omeprazole Rash    Oxycodone Other (See Comments) and Dizziness     constipation    Ranitidine Rash       Patient Story: Pt BIBA from the [facility] [address] in [facility]. Patient's son went to see her today and couldn't find her. She had been sitting in the lobby for a few hours doing nothing and was confused. Patient has hx of subdural hematoma recently and was seen here. Patient also has hx of UTIs. Findings today include UTI. Patient alert and oriented X 4. Patient states she ambulates without a walker or cane typically. Delay in starting antibiotics due to inability to collect blood cultures/delay with phlebotomy.     Chest Xray:  Multifocal pneumonitis favored over asymmetric edema     Chest CT PE pending.    Focused Assessment:  see above    Treatments and/or interventions provided: see MAR  Patient's response to treatments and/or interventions:     To be done/followed up on inpatient unit:  remaining inpatient orders    Does this patient have any cognitive concerns?:  NO    Activity level - Baseline/Home:  Independent  Activity Level - Current:   Stand with Assist    Patient's Preferred language: English   Needed?: No    Isolation: None  Infection: Not Applicable  Patient tested for COVID 19 prior to admission: YES  Bariatric?: No    Vital Signs:   Vitals:    10/21/23 1707 10/21/23 1717   BP: 132/83    Pulse: 95    Resp: 12    Temp:  (!) 100.9  F (38.3  C)   TempSrc:  Rectal   SpO2: 95%        Cardiac  Rhythm:     Was the PSS-3 completed:   Yes  What interventions are required if any?               Family Comments: son at bedside   OBS brochure/video discussed/provided to patient/family: No              Name of person given brochure if not patient:               Relationship to patient:     For the majority of the shift this patient's behavior was Green.   Behavioral interventions performed were .    ED NURSE PHONE NUMBER:

## 2023-10-21 NOTE — ED NOTES
Phlebotomy re-called to come and draw patient's blood cultures prior to antibiotics being started.

## 2023-10-21 NOTE — ED NOTES
Bed: ED07  Expected date: 10/21/23  Expected time: 4:50 PM  Means of arrival: Ambulance  Comments:  451 83f altered, recent bleed ETA 8548

## 2023-10-21 NOTE — ED TRIAGE NOTES
Triage Assessment (Adult)       Row Name 10/21/23 1701          Triage Assessment    Airway WDL WDL        Respiratory WDL    Respiratory WDL WDL        Skin Circulation/Temperature WDL    Skin Circulation/Temperature WDL WDL        Cardiac WDL    Cardiac WDL WDL        Peripheral/Neurovascular WDL    Peripheral Neurovascular WDL WDL        Cognitive/Neuro/Behavioral WDL    Cognitive/Neuro/Behavioral WDL X     Level of Consciousness alert     Arousal Level opens eyes spontaneously     Orientation oriented x 4     Speech clear     Mood/Behavior calm;cooperative        Lauren Coma Scale    Best Eye Response 4-->(E4) spontaneous     Best Motor Response 6-->(M6) obeys commands     Best Verbal Response 5-->(V5) oriented     Lauren Coma Scale Score 15

## 2023-10-21 NOTE — ED PROVIDER NOTES
History     Chief Complaint:  Altered Mental Status       HPI   Nazia Goldsmith is a 83 year old female with history of rheumatoid arthritis, pulmonary hypertension, mitral regurgitation, mitral stenosis, coronary artery disease, heart failure with preserved ejection fraction presents emergency department via EMS from her care home with a complaint of confusion.  Patient's done states that she was sitting in the lobby of her care home, and was confused.  Patient does not have any complaints.  She states that she does not feel confused.  She denies any falls.  She denies any vomiting, diarrhea, fevers.  She denies any pain including chest pain, abdominal pain.      Independent Historian:   None - Patient Only    Review of External Notes:   Reviewed patient's discharge from the hospital on 10/12/2023 for altered mental status, she was found to have a 3 mm subdural that was not thought to be contributing to her confusion.  She was also found to have a leukocytosis with no clear source of infection.      Medications:    acetaminophen (TYLENOL) 500 MG tablet  ferrous gluconate (FERGON) 324 (38 Fe) MG tablet  furosemide (LASIX) 40 MG tablet  gabapentin (NEURONTIN) 100 MG capsule  hydroxychloroquine (PLAQUENIL) 200 MG tablet  leflunomide (ARAVA) 20 MG tablet  Lidocaine (LIDOCARE) 4 % Patch  lidocaine HCl 3 % cream  loperamide (IMODIUM) 2 MG capsule  losartan (COZAAR) 50 MG tablet  metoprolol succinate ER (TOPROL XL) 50 MG 24 hr tablet  multivitamin, therapeutic (THERA-VIT) TABS tablet  ondansetron (ZOFRAN ODT) 4 MG ODT tab  pantoprazole (PROTONIX) 40 MG EC tablet  psyllium (METAMUCIL/KONSYL) capsule  rosuvastatin (CRESTOR) 10 MG tablet  solifenacin (VESICARE) 5 MG tablet  spironolactone (ALDACTONE) 25 MG tablet  vitamin D3 (CHOLECALCIFEROL) 50 mcg (2000 units) tablet  zoledronic Acid (RECLAST) 5 MG/100ML SOLN infusion        Past Medical History:    No past medical history on file.    Past Surgical History:    Past  Surgical History:   Procedure Laterality Date    CV CORONARY ANGIOGRAM N/A 7/31/2023    Procedure: Coronary Angiogram;  Surgeon: Sharmila Lombardo MD;  Location:  [facility]    CV RIGHT HEART CATH MEASUREMENTS RECORDED N/A 7/31/2023    Procedure: Right Heart Catheterization;  Surgeon: Sharmila Lombardo MD;  Location:  [facility]        Physical Exam   Patient Vitals for the past 24 hrs:   BP Temp Temp src Pulse Resp SpO2   10/21/23 1900 (!) 152/80 -- -- 85 -- 93 %   10/21/23 1717 -- (!) 100.9  F (38.3  C) Rectal -- -- --   10/21/23 1707 132/83 -- -- 95 12 95 %        Physical Exam  Gen: No distress.  Nontoxic.  Head: Atraumatic.  No hematomas, lesions, abrasions  Neck: No midline tenderness of the spine.  Mouth: No oral lesions, or abrasions.  Mucous membranes are moist.  Resp: Equal breath sounds, normal respiratory effort  Cardio: Regular rate and rhythm, no murmurs  Abdomen: Soft, nontender, nondistended  MSK; no extremity swelling, bruising, or abrasions.  Neuro: Cranial nerves II through XII intact.  5 out of 5 muscle strength in the upper and lower extremities.  Sensation intact in the upper and lower extremities.  Finger-to-nose negative.  Heel-to-shin negative.  No truncal ataxia.  Patient is alert and oriented to name, place, month, year.  Psych: Appropriate affect.      Emergency Department Course     ECG results from 10/21/23   EKG 12-lead, tracing only     Value    Systolic Blood Pressure     Diastolic Blood Pressure     Ventricular Rate 86    Atrial Rate 86    DC Interval 148    QRS Duration 80        QTc 445    P Axis 33    R AXIS 15    T Axis 121    Interpretation ECG      Sinus rhythm  ST & T wave abnormality, consider lateral ischemia  Abnormal ECG  When compared with ECG of 12-OCT-2023 21:21,  No significant change was found  Confirmed by GENERATED REPORT, COMPUTER ([id number]),  Suzi Kapoor ([id number]) on 10/21/2023 6:01:03 PM           Imaging:  Chest  XR,  PA & LAT   Final Result   IMPRESSION: Heart size is normal. There are 2 new right lower lobe pulmonary nodules, measuring up to 2 cm. Left upper lobe nodular opacity abutting the aortic arch redemonstrated. There is increasing prominence of the right hilum. Lung volumes have    diminished with increasing heterogeneous parenchymal opacities. Multifocal pneumonitis favored over asymmetric edema. Further evaluation with chest CT recommended. No associated effusions. Biapical pleural calcifications are redemonstrated.       CT Head w/o Contrast   Final Result   IMPRESSION:   1.  No acute intracranial abnormality. Chronic changes as above.      CT Chest w Contrast    (Results Pending)          Laboratory:  Labs Ordered and Resulted from Time of ED Arrival to Time of ED Departure   BASIC METABOLIC PANEL - Abnormal       Result Value    Sodium 132 (*)     Potassium 3.8      Chloride 97 (*)     Carbon Dioxide (CO2) 22      Anion Gap 13      Urea Nitrogen 20.9      Creatinine 0.94      GFR Estimate 60 (*)     Calcium 7.6 (*)     Glucose 86     ROUTINE UA WITH MICROSCOPIC REFLEX TO CULTURE - Abnormal    Color Urine Light Yellow      Appearance Urine Slightly Cloudy (*)     Glucose Urine Negative      Bilirubin Urine Negative      Ketones Urine Negative      Specific Gravity Urine 1.011      Blood Urine Negative      pH Urine 5.5      Protein Albumin Urine Negative      Urobilinogen Urine Normal      Nitrite Urine Positive (*)     Leukocyte Esterase Urine Large (*)     Mucus Urine Present (*)     RBC Urine <1      WBC Urine 169 (*)     Hyaline Casts Urine 6 (*)    CBC WITH PLATELETS AND DIFFERENTIAL - Abnormal    WBC Count 12.6 (*)     RBC Count 3.12 (*)     Hemoglobin 10.1 (*)     Hematocrit 31.2 (*)           MCH 32.4      MCHC 32.4      RDW 13.6      Platelet Count 224      % Neutrophils 77      % Lymphocytes 10      % Monocytes 10      Mids % (Monos, Eos, Basos)        % Eosinophils 2      % Basophils 1      %  Immature Granulocytes 0      NRBCs per 100 WBC 0      Absolute Neutrophils 9.9 (*)     Absolute Lymphocytes 1.2      Absolute Monocytes 1.2      Mids Abs (Monos, Eos, Basos)        Absolute Eosinophils 0.2      Absolute Basophils 0.1      Absolute Immature Granulocytes 0.1      Absolute NRBCs 0.0     ISTAT GASES LACTATE VENOUS POCT - Abnormal    Lactic Acid POCT 1.0      Bicarbonate Venous POCT 27      O2 Sat, Venous POCT 31 (*)     pCO2 Venous POCT 47      pH Venous POCT 7.37      pO2 Venous POCT 20 (*)    INFLUENZA A/B, RSV, & SARS-COV2 PCR - Normal    Influenza A PCR Negative      Influenza B PCR Negative      RSV PCR Negative      SARS CoV2 PCR Negative     TROPONIN T, HIGH SENSITIVITY   PROCALCITONIN   BLOOD CULTURE   BLOOD CULTURE   URINE CULTURE        Procedures       Emergency Department Course & Assessments:             Interventions:  Medications   sodium chloride (PF) 0.9% PF flush 3 mL (has no administration in time range)   sodium chloride (PF) 0.9% PF flush 3 mL (has no administration in time range)   piperacillin-tazobactam (ZOSYN) 3.375 g vial to attach to  mL bag (has no administration in time range)   vancomycin (VANCOCIN) 1,000 mg in 200 mL dextrose intermittent infusion (has no administration in time range)   sodium chloride 0.9% BOLUS 1,000 mL (1,000 mLs Intravenous $New Bag 10/21/23 1734)   acetaminophen (TYLENOL) tablet 1,000 mg (1,000 mg Oral $Given 10/21/23 1834)        Assessments:      Independent Interpretation (X-rays, CTs, rhythm strip):  Chest x-ray does not show consolidation, pleural effusion, pneumothorax.    Consultations/Discussion of Management or Tests:  Spoke with Dr. Yan for admission.       Social Determinants of Health affecting care:   None    Disposition:  The patient was admitted to the hospital under the care of Dr. Yan.     Impression & Plan    CMS Diagnoses: None      Medical Decision Makin-year-old female with a history of rheumatoid arthritis,  pulmonary hypertension, CHF, coronary artery disease presents to the emergency department with a complaint of confusion.  Patient was found wandering around her care home, in places that she had never been seen before.  She also sat down in a chair and the staff found her 2 hours later.  The patient did not know where she was.  Patient's son states that this is more confusion than her baseline.  On evaluation patient does not have any complaints.  She knows her name, she knows where she is, she knows the date.  She is not having any abdominal pain.  She does have a history of a subdural hematoma that was found on 10/12/2023.  She states that she has not had any falls.  At this point I think the patient is a fairly poor historian.   Patient is febrile.  Her urinalysis shows nitrite positive urine.  She is not having any flank pain, I do not think that she has a kidney stone.  Previous cultures show E. coli that are susceptible to everything.  Her last culture from the 12th grew Enterococcus faecium, which is susceptible to Vanco, linezolid, nitrofurantoin.  I did order her some Vanco and Zosyn here in the ED. chest x-ray shows bilateral pneumonitis.  Patient is not in any respiratory distress.  She is not hypoxic.  She is not tachycardic.  No chest pain.  I do not think that she has a PE.  CT chest is recommended and is ordered.  Head CT does not show any acute findings.  I think that her confusion is from her urinary tract infection that I think has turned into pyelonephritis with her systemic symptoms of fever and metabolic encephalopathy.  Patient is admitted for metabolic encephalopathy secondary to urinary tract infection and pneumonitis.      Diagnosis:    ICD-10-CM    1. Pyelonephritis  N12       2. Metabolic encephalopathy  G93.41       3. Pneumonitis  J98.4            Discharge Medications:  New Prescriptions    No medications on file          Saritha Phelps MD  10/21/2023   Saritha Phelps MD         Saritha Phelps MD  10/21/23 1932

## 2023-10-21 NOTE — ED TRIAGE NOTES
Pt [facility] [address] [facility] Patient's son went to see her today and couldn't find her. She had been sitting in the lobby for a few hours doing nothing and was confused. Patient has hx of subdural hematoma recently and was seen here. Patient also has hx of UTIs.     Glucose for EMS: 99

## 2023-10-22 PROBLEM — S22.21XA FRACTURE OF MANUBRIUM, INITIAL ENCOUNTER FOR CLOSED FRACTURE: Status: ACTIVE | Noted: 2023-01-01

## 2023-10-22 NOTE — PROGRESS NOTES
Incidentally found non-displaced manubrial fracture. No indication for surgery.    I discussed her case with Dr. Leblanc. We'll sign off.

## 2023-10-22 NOTE — PLAN OF CARE
Goal Outcome Evaluation:  Summary:  Sepsis d/t UTI, pneumonia     DATE & TIME: 10/22/23 1772-9105   Cognitive Concerns/ Orientation: A&Ox4  BEHAVIOR & AGGRESSION TOOL COLOR: Green  CIWA SCORE: n/a   ABNL VS/O2: VSS on RA  MOBILITY: Assist of 1 with GB/walker  PAIN MANAGMENT: Denies   DIET: Regular, good appetite  BOWEL/BLADDER: Incontinent of bowel and bladder, BM x1 this shift. Uses purewick at night.  ABNL LAB/BG: Troponin 86; Hg 9.7; Na 133; WBC 11.9; Positive blood occult sample.  DRAIN/DEVICES: R PIV SL, intermittent abx  TELEMETRY RHYTHM: Sinus Rhythm  SKIN: Scattered bruising, callus under left toes, right foot smaller than left d/t the rheumatoid arthritis surgery. Rheumatoid arthritis both arms. Scab on left shin from old wound.  TESTS/PROCEDURES: possible Echo  D/C DAY/GOALS/PLACE: pending improvement   OTHER IMPORTANT INFO: Hemoglobin check q8 due to fluctuating levels. Thoracic surgery signed off

## 2023-10-22 NOTE — CONSULTS
[facility] Cardiology Consultation    Date of Admission:  10/21/2023  Date of Service: 10/22/23  Patient Name: Nazia Goldsmith  MRN: [id number]    Subjective       REASON FOR CONSULT  NSTEMI      HISTORY OF PRESENT ILLNESS  Ms. Nazia Goldsmith is a 83 year old female admitted 10/21/2023 for confusion and concern for sepsis.  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 Goldsmith was brought from her assisted living facility on account of increased confusion on 10/21.  In the emergency department she was hemodynamically stable and saturating well with a temperature of 100.9.  ECG showing sinus rhythm with T wave inversions laterally.  Initial labs notable for hyponatremia, leukocytosis, troponin 113, procalcitonin 0.13, UA with nitrates, leuk esterase, and white cells.  Urine culture positive for E. coli.  Head CT showing no acute abnormality chest CT showing a new mildly displaced fracture of the manubrium.  Initially concern for sepsis and so she was started on vancomycin and Zosyn.  Subsequently admitted.    She has remained hemodynamically stable since arrival and afebrile.  Earlier this morning an RRT was called for chest discomfort.  Per report, the pain was worse with inspiration and reproducible with palpation to the sternum.  Repeat ECG largely unchanged.  Repeat troponin down to 84.  Cardiology consulted in the context of chest pain.    In speaking with her this afternoon, she has had some on-and-off chest discomfort since the fall at her facility recently.  I suspect this was likely when she fractured her manubrium.  There does not seem to be any exertional component to this, albeit she is quite sedentary.  She does not report any shortness of breath, palpitations, dizziness, or other cardiovascular symptoms.   Her chest discomfort this morning was worse when staff palpated or pressed on her chest, and appeared to have resolved on its own.  She cannot really identify any precipitating factors for this particular chest discomfort, it seems to come and go.      PAST MEDICAL/SURGICAL HISTORY  Past Medical History:   Diagnosis Date    Acute on chronic diastolic congestive heart failure (H) 2023    Breast cancer (H) 2010    right stage I    CAD (coronary artery disease)     diffuse disease of LAD and RCA    Diverticulosis of large intestine     Duodenal ulcer without hemorrhage or perforation and without obstruction     Esophageal stricture     Gastroesophageal reflux disease with esophagitis     Grade II diastolic dysfunction     HTN (hypertension)     Mitral valve regurgitation     moderate    Mitral valve stenosis     mild    Osteoarthritis     Osteoporosis     PAD (peripheral artery disease) (H24) 2022    LLE angioplasty    Pulmonary nodules 2020    RML    RA (rheumatoid arthritis) (H)     T12 compression fracture (H)     Tibial plateau fracture     right       SOCIAL HISTORY  Social History     Socioeconomic History    Marital status:      Spouse name: None    Number of children: None    Years of education: None    Highest education level: None       FAMILY HISTORY  No family history on file.      Objective   [unfilled]  Admission Weight: 47.2 kg (104 lb 0.9 oz)  Current Weight: 47.2 kg (104 lb 0.9 oz)    PHYSICAL EXAMINATION  General:  Well-developed, well-nourished. No acute distress.  Pleasant and cooperative.  HEENT:  Extraocular movements grossly intact.   Cardiovascular:  Regular rate and rhythm.  S1/2, 2/6 blowing systolic murmur left sternal border  Lungs:  Normal work of breathing.  Clear anteriorly  Abdomen:  Soft, nontender  Extremities:  Warm, well perfused.     I/Os    Intake/Output Summary (Last 24 hours) at 10/22/2023 1413  Last data filed at 10/22/2023 1026  Gross per 24 hour   Intake 120 ml    Output 400 ml   Net -280 ml       DIAGNOSTICS  Most Recent 3 CBC's:  Recent Labs   Lab Test 10/22/23  0650 10/21/23  1732 10/13/23  1004   WBC 11.9* 12.6* 8.0   HGB 8.4* 10.1* 10.1*    100 100    224 202     Most Recent 3 BMP's:  Recent Labs   Lab Test 10/22/23  0650 10/21/23  1732 10/13/23  1250   * 132* 131*   POTASSIUM 3.8 3.8 3.7   CHLORIDE 98 97* 90*   CO2 21* 22 24   BUN 15.3 20.9 21.3   CR 0.86 0.94 0.91   ANIONGAP 14 13 17*   JAMES 8.1* 7.6* 8.9   GLC 86 86 95     Most Recent 3 Troponin's:No lab results found.  Most Recent 3 BNP's:  Recent Labs   Lab Test 08/01/23  0632 07/31/23  0622 07/30/23  0600 07/25/23  0847 08/31/22  1220   NTBNPI 1,332 1,398 1,690   < >  --    NTBNP  --   --   --   --  1,656*    < > = values in this interval not displayed.     Most Recent Cholesterol Panel:No lab results found.    Electrocardiogram:  [unfilled]  Most Recent Transthoracic Echocardiogram:  Echo result w/o MOPS: Interpretation Summary 1. Left ventricular systolic function is normal. The visual ejection fractionis 55-60%.2. No regional wall motion abnormalities noted.3. The right ventricle is normal in structure, function and size.4. The left atrium is moderately dilated.5. There is moderate (2+) mitral regurgitation.6. There is moderate mitral stenosis; mean gradient 7 mmHg at HR 80 bpm.7. Severe pulmonary hypertension; The right ventricular systolic pressure isapproximated at 56mmHg plus the right atrial pressure. IVC diameter <2.1 cmcollapsing >50% with sniff suggests a normal RA pressure of 3 mmHg.      Most Recent Cardiac Catheterization:  Cardiac Catheterization    Result Date: 7/31/2023    Ost RCA lesion is 50% stenosed.    1.  Diffusely diseased LAD with serial lesions extending from the proximal   to midportion of the vessel  2.   LCx with faint left to left to left and right to left collaterals  3.  Serial moderate proximal RCA lesions present  4.  Normal left-sided filling pressures  (PCWP 7 mmHg)  5.  Normal right-sided filling pressures (RA 2 mmHg)  6.  No pulmonary hypertension present (mean PA 17 mmHg)  7.  Normal cardiac index (2.56)        Assessment & Plan     Ms. Goldsmith is a pleasant 83 year old female who initially presented with confusion, work-up so far consistent with UTI.  CT incidentally showing a manubrium fracture.  The patient had an episode of chest discomfort this AM and cardiology consulted in this context.    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  She has been pain-free ever since.  She has had no exertional symptoms, albeit is quite sedentary at baseline.  ECGs have been stable and without any new ischemic changes.  Her troponin has trended down from admission.  I suspect the troponin elevation is a type II event from her infection.  We have an echo pending.  If there are significant new wall motion abnormalities, she has known coronary disease and so we could consider another catheterization and revascularization.  Given the concern for bleeding, I do not feel she needs heparinization.  We will follow the echo which will help guide further work-up. Of note, while her TTE in July suggested an elevated RVSP, subsequent right heart catheterization showed normal left-sided filling pressures and no evidence of pulmonary hypertension      Plan:  - Follow up TTE  - Asa for now  - No immediate indication for ACS treatment      Eric Bonner MD  10/22/2023    Medical Decision Making       60 MINUTES SPENT BY ME on the date of service doing chart review, history, exam,  documentation & further activities per the note.

## 2023-10-22 NOTE — SIGNIFICANT EVENT
The patient complained chest pain around 0600, VSS, other than elevated bp, RRT called, EKG done, no significant findings, gave prn tylenol, patient reported feeling fine.

## 2023-10-22 NOTE — PROGRESS NOTES
Paged by Dr. Leblanc regarding 83F with history of SDH on prior admission regarding initiation of dual antiplatelet therapy. Reviewed new imaging from yesterday:    Narrative & Impression   EXAM: CT HEAD W/O CONTRAST  LOCATION: [facility]  DATE: 10/21/2023     INDICATION: Confusion  COMPARISON: 10/13/2023.  TECHNIQUE: Routine CT Head without IV contrast. Multiplanar reformats. Dose reduction techniques were used.     FINDINGS:  INTRACRANIAL CONTENTS: No acute intracranial hemorrhage. No hydrocephalus or extra-axial hemorrhage. Mild to moderate global cortical volume loss with expected dilatation of the ventricular system.     Chronic small vessel ischemic changes are stable from recent CT assessment.     VISUALIZED ORBITS/SINUSES/MASTOIDS: No intraorbital abnormality. No paranasal sinus mucosal disease. No middle ear or mastoid effusion.     BONES/SOFT TISSUES: No acute abnormality.                                                                      IMPRESSION:  1.  No acute intracranial abnormality. Chronic changes as above.     Recommendations:  Prior head CT with resolution of previous left frontal SDH and head CT yesterday with no acute intracranial abnormality. Ok to begin anticoagulation/antiplatelet therapy as deemed necessary by other services.      Abida Eugene, JANIE  [facility] Neurosurgery  [address] [place] [id number]  Tel [phone]  Fax [phone]  Text page via [facility] Paging/Directory

## 2023-10-22 NOTE — PROGRESS NOTES
[facility]  House Officer AMADOU RRT Brief Follow up note:    Signout to follow troponin    -Manubrial fracture as suspected etiology of current chest discomfort  -Troponin from yesterday is 114, repeat at time of CP complaint is 84  -Hg 8.4 this am, drop from ? IVF,     Plan:  -Troponin-last check at 1400 hrs   -Hemoglobin-check at 1400 hrs., transfuse prn hgb<8    No charge for this note    SABA Miranda [facility]  Securely message with the Vocera Web Console (learn more here)  Text page via [facility] Paging/Directory

## 2023-10-22 NOTE — CODE/RAPID RESPONSE
"[facility]  House AMADOU RRT Note  10/22/2023   Time called: 0554  RRT called for: Chest pain  Code status: No CPR- Do NOT Intubate    Assessment & Plan   Patient is an 83 year old female with PMH significant for recent tiny subdural hematoma which has since resolved on CT, RA, GERD, pulmonary nodule, normocytic anemia, urinary incontinence, chronic hyponatremia, mitral regurgitation and PVD who was admitted with sepsis likely urinary source and NSTEMI type II due to sepsis.    I was paged to the bedside to evaluate Ms. Nazia Goldsmith for an acute episode of chest pain.     Upon my arrival the patient was supine in bed and appeared to be in no acute distress. Per RN the patient had began complaining of pain just after being changed as she had been incontinent in her brief.  When asked patient about her chest pain she denied that it felt like \"pain\" and she was unable to describe the sensation.  Patient endorsed the pain worsening with deep inspiration.  Pain was also reproducible with palpation to patient's sternum.  On chart review patient had a new mildly displaced fracture of her manubrium on CT scan from 10/21/2023.  Patient was unaware of this fracture. Patient was vitally stable.  Patient was on room air with oxygen saturation of 91% so she was started on 1 L nasal cannula oxygen.  Patient was warm and dry with 2+ pulses in all extremities.  Patient was able to move all extremities.  Patient had nontender abdomen that was soft.  Lung sounds clear.  Systolic murmur heard. Patient stated that her pain had resolved and that she felt better. Patient declined tylenol and only requested a warm blanket.    Diagnosis:  -- Chest pain, likely musculoskeletal 2/2 fractured manubrium  EKG non-ischemic and unchanged from prior. Check troponin with am labs.     Plan:  -- Troponin with morning labs  -- EKG: unchanged from prior and non-ischemic    At the conclusion of this RRT patient was " hemodynamically stable and will remain on current unit    Her history is significant for:  Past Medical History:   Diagnosis Date    Acute on chronic diastolic congestive heart failure (H) 2023    Breast cancer (H) 2010    right stage I    CAD (coronary artery disease)     diffuse disease of LAD and RCA    Diverticulosis of large intestine     Duodenal ulcer without hemorrhage or perforation and without obstruction     Esophageal stricture     Gastroesophageal reflux disease with esophagitis     Grade II diastolic dysfunction     HTN (hypertension)     Mitral valve regurgitation     moderate    Mitral valve stenosis     mild    Osteoarthritis     Osteoporosis     PAD (peripheral artery disease) (H24) 2022    LLE angioplasty    Pulmonary nodules 2020    RML    RA (rheumatoid arthritis) (H)     T12 compression fracture (H)     Tibial plateau fracture     right     Past Surgical History:   Procedure Laterality Date    APPENDECTOMY      BLADDER SURGERY      BREAST BIOPSY, RT/LT      CHOLECYSTECTOMY      CV CORONARY ANGIOGRAM N/A 07/31/2023    Procedure: Coronary Angiogram;  Surgeon: Sharmila Lombardo MD;  Location: [facility] CARDIAC CATH LAB    CV RIGHT HEART CATH MEASUREMENTS RECORDED N/A 07/31/2023    Procedure: Right Heart Catheterization;  Surgeon: Sharmila Lombardo MD;  Location:  HEART CARDIAC CATH LAB    FOOT SURGERY Right 2009    HYSTERECTOMY      LUMPECTOMY BREAST      MASTOID SURGERY      TOTAL HIP ARTHROPLASTY Right 2013    TOTAL KNEE ARTHROPLASTY Right 2019       Review of Systems   The 10 point Review of Systems is negative other than noted in the HPI or here.     Allergies   Allergies   Allergen Reactions    Hydrocodone     Methotrexate Unknown    Sulindac Unknown    Triamterene Nausea    Codeine Dizziness    Dyazide [Hydrochlorothiazide W-Triamterene] Nausea and Vomiting    Hydrocodone-Acetaminophen Nausea and Vomiting    Omeprazole Rash    Oxycodone Other (See Comments) and Dizziness      constipation    Ranitidine Rash       Physical Exam   Physical Exam  Constitutional:       General: She is not in acute distress.     Appearance: She is not ill-appearing.   HENT:      Nose: Nose normal.      Mouth/Throat:      Mouth: Mucous membranes are moist.   Eyes:      Pupils: Pupils are equal, round, and reactive to light.   Cardiovascular:      Rate and Rhythm: Normal rate and regular rhythm.   Pulmonary:      Effort: Pulmonary effort is normal.      Breath sounds: Normal breath sounds.   Abdominal:      General: Abdomen is flat. There is no distension.      Palpations: Abdomen is soft.   Musculoskeletal:      Right lower leg: No edema.      Left lower leg: No edema.   Skin:     General: Skin is warm and dry.      Capillary Refill: Capillary refill takes less than 2 seconds.   Neurological:      General: No focal deficit present.      Mental Status: She is alert and oriented to person, place, and time.   Psychiatric:         Mood and Affect: Mood normal.         Vital Signs with Ranges:  Temp:  [97.7  F (36.5  C)-100.9  F (38.3  C)] 97.7  F (36.5  C)  Pulse:  [70-95] 75  Resp:  [12-18] 18  BP: (106-152)/(46-83) 118/56  SpO2:  [93 %-95 %] 95 %  No intake/output data recorded.    Data   Results for orders placed or performed during the hospital encounter of 10/21/23 (from the past 24 hour(s))   UA with Microscopic reflex to Culture    Specimen: Urine, Catheter   Result Value Ref Range    Color Urine Light Yellow Colorless, Straw, Light Yellow, Yellow    Appearance Urine Slightly Cloudy (A) Clear    Glucose Urine Negative Negative mg/dL    Bilirubin Urine Negative Negative    Ketones Urine Negative Negative mg/dL    Specific Gravity Urine 1.011 1.003 - 1.035    Blood Urine Negative Negative    pH Urine 5.5 5.0 - 7.0    Protein Albumin Urine Negative Negative mg/dL    Urobilinogen Urine Normal Normal, 2.0 mg/dL    Nitrite Urine Positive (A) Negative    Leukocyte Esterase Urine Large (A) Negative    Mucus Urine  Present (A) None Seen /LPF    RBC Urine <1 <=2 /HPF    WBC Urine 169 (H) <=5 /HPF    Hyaline Casts Urine 6 (H) <=2 /LPF    Narrative    Urine Culture ordered based on laboratory criteria   CBC with platelets differential    Narrative    The following orders were created for panel order CBC with platelets differential.  Procedure                               Abnormality         Status                     ---------                               -----------         ------                     CBC with platelets and d...[[id number]]  Abnormal            Final result                 Please view results for these tests on the individual orders.   Basic metabolic panel   Result Value Ref Range    Sodium 132 (L) 135 - 145 mmol/L    Potassium 3.8 3.4 - 5.3 mmol/L    Chloride 97 (L) 98 - 107 mmol/L    Carbon Dioxide (CO2) 22 22 - 29 mmol/L    Anion Gap 13 7 - 15 mmol/L    Urea Nitrogen 20.9 8.0 - 23.0 mg/dL    Creatinine 0.94 0.51 - 0.95 mg/dL    GFR Estimate 60 (L) >60 mL/min/1.73m2    Calcium 7.6 (L) 8.8 - 10.2 mg/dL    Glucose 86 70 - 99 mg/dL   CBC with platelets and differential   Result Value Ref Range    WBC Count 12.6 (H) 4.0 - 11.0 10e3/uL    RBC Count 3.12 (L) 3.80 - 5.20 10e6/uL    Hemoglobin 10.1 (L) 11.7 - 15.7 g/dL    Hematocrit 31.2 (L) 35.0 - 47.0 %     78 - 100 fL    MCH 32.4 26.5 - 33.0 pg    MCHC 32.4 31.5 - 36.5 g/dL    RDW 13.6 10.0 - 15.0 %    Platelet Count 224 150 - 450 10e3/uL    % Neutrophils 77 %    % Lymphocytes 10 %    % Monocytes 10 %    Mids % (Monos, Eos, Basos)      % Eosinophils 2 %    % Basophils 1 %    % Immature Granulocytes 0 %    NRBCs per 100 WBC 0 <1 /100    Absolute Neutrophils 9.9 (H) 1.6 - 8.3 10e3/uL    Absolute Lymphocytes 1.2 0.8 - 5.3 10e3/uL    Absolute Monocytes 1.2 0.0 - 1.3 10e3/uL    Mids Abs (Monos, Eos, Basos)      Absolute Eosinophils 0.2 0.0 - 0.7 10e3/uL    Absolute Basophils 0.1 0.0 - 0.2 10e3/uL    Absolute Immature Granulocytes 0.1 <=0.4 10e3/uL    Absolute  NRBCs 0.0 10e3/uL   Troponin T, High Sensitivity (now)   Result Value Ref Range    Troponin T, High Sensitivity 113 (HH) <=14 ng/L   Procalcitonin   Result Value Ref Range    Procalcitonin 0.13 (H) <0.05 ng/mL   Iron and iron binding capacity   Result Value Ref Range    Iron 16 (L) 37 - 145 ug/dL    Iron Binding Capacity 248 240 - 430 ug/dL    Iron Sat Index 6 (L) 15 - 46 %   Ferritin   Result Value Ref Range    Ferritin 258 11 - 328 ng/mL   Vitamin B12   Result Value Ref Range    Vitamin B12 281 232 - 1,245 pg/mL   [place] Draw    Narrative    The following orders were created for panel order [place] Draw.  Procedure                               Abnormality         Status                     ---------                               -----------         ------                     Extra Blue Top Tube[[id number]]                              Final result                 Please view results for these tests on the individual orders.   Extra Blue Top Tube   Result Value Ref Range    Hold Specimen JI    iStat Gases (lactate) venous, POCT   Result Value Ref Range    Lactic Acid POCT 1.0 <=2.0 mmol/L    Bicarbonate Venous POCT 27 21 - 28 mmol/L    O2 Sat, Venous POCT 31 (L) 94 - 100 %    pCO2 Venous POCT 47 40 - 50 mm Hg    pH Venous POCT 7.37 7.32 - 7.43    pO2 Venous POCT 20 (L) 25 - 47 mm Hg   Symptomatic Influenza A/B, RSV, & SARS-CoV2 PCR (COVID-19) Nasopharyngeal    Specimen: Nasopharyngeal; Swab   Result Value Ref Range    Influenza A PCR Negative Negative    Influenza B PCR Negative Negative    RSV PCR Negative Negative    SARS CoV2 PCR Negative Negative    Narrative    Testing was performed using the Xpert Xpress CoV2/Flu/RSV Assay on the Cepheid GeneXpert Instrument. This test should be ordered for the detection of SARS-CoV-2, influenza, and RSV viruses in individuals who meet clinical and/or epidemiological criteria. Test performance is unknown in asymptomatic patients. This test is for in vitro diagnostic use  under the FDA EUA for laboratories certified under CLIA to perform high or moderate complexity testing. This test has not been FDA cleared or approved. A negative result does not rule out the presence of PCR inhibitors in the specimen or target RNA in concentration below the limit of detection for the assay. If only one viral target is positive but coinfection with multiple targets is suspected, the sample should be re-tested with another FDA cleared, approved, or authorized test, if coinfection would change clinical management. This test was validated by the [facility]. These laboratories are certified under the Clinical Laboratory Improvement Amendments of 1988 (CLIA-88) as qualified to perform high complexity laboratory testing.   EKG 12-lead, tracing only   Result Value Ref Range    Systolic Blood Pressure  mmHg    Diastolic Blood Pressure  mmHg    Ventricular Rate 86 BPM    Atrial Rate 86 BPM    CA Interval 148 ms    QRS Duration 80 ms     ms    QTc 445 ms    P Axis 33 degrees    R AXIS 15 degrees    T Axis 121 degrees    Interpretation ECG       Sinus rhythm  ST & T wave abnormality, consider lateral ischemia  Abnormal ECG  When compared with ECG of 12-OCT-2023 21:21,  No significant change was found  Confirmed by GENERATED REPORT, COMPUTER ([id number]),  Suzi Kapoor ([id number]) on 10/21/2023 6:01:03 PM     CT Head w/o Contrast    Narrative    EXAM: CT HEAD W/O CONTRAST  LOCATION: [facility]  DATE: 10/21/2023    INDICATION: Confusion  COMPARISON: 10/13/2023.  TECHNIQUE: Routine CT Head without IV contrast. Multiplanar reformats. Dose reduction techniques were used.    FINDINGS:  INTRACRANIAL CONTENTS: No acute intracranial hemorrhage. No hydrocephalus or extra-axial hemorrhage. Mild to moderate global cortical volume loss with expected dilatation of the ventricular system.    Chronic small vessel ischemic changes are stable from recent CT  assessment.    VISUALIZED ORBITS/SINUSES/MASTOIDS: No intraorbital abnormality. No paranasal sinus mucosal disease. No middle ear or mastoid effusion.    BONES/SOFT TISSUES: No acute abnormality.      Impression    IMPRESSION:  1.  No acute intracranial abnormality. Chronic changes as above.   Chest XR,  PA & LAT    Narrative    EXAM: XR CHEST 2 VIEWS  LOCATION: [facility]  DATE: 10/21/2023    INDICATION: fever, confusion  COMPARISON: 10/13/2023      Impression    IMPRESSION: Heart size is normal. There are 2 new right lower lobe pulmonary nodules, measuring up to 2 cm. Left upper lobe nodular opacity abutting the aortic arch redemonstrated. There is increasing prominence of the right hilum. Lung volumes have   diminished with increasing heterogeneous parenchymal opacities. Multifocal pneumonitis favored over asymmetric edema. Further evaluation with chest CT recommended. No associated effusions. Biapical pleural calcifications are redemonstrated.    CT Chest w Contrast    Narrative    EXAM: CT CHEST W CONTRAST  LOCATION: [facility]  DATE: 10/21/2023    INDICATION: bilateral pneumonitis  COMPARISON: CT pulmonary angiogram 07/25/2023  TECHNIQUE: CT chest with IV contrast. Multiplanar reformats were obtained. Dose reduction techniques were used.    CONTRAST: 50  ml Isovue 370    FINDINGS:   LUNGS AND PLEURA: There are multiple heterogeneous attenuation solid lung nodules which are in the peripheral third and/or subpleural lungs bilaterally. A larger representative nodule in the right middle lobe along the anterior costal pleura is 19 x 30   mm (series 4, image 193). Other nodules are present in the lateral segment right middle lobe, right lower lobe superior segment along the mediastinal pleura, in the lateral basal right lower lobe and along the mediastinal pleura of the left upper lobe   and left lower lobe superior segment. Nodules along the mediastinal pleura in  the upper lobe have central cavitation. All of these nodules are stable in size. Bilateral mixed attenuation airspace opacities which predominate within the mid and upper lungs   in July 2023 have cleared. No new airspace opacities. Thin bands of atelectasis are present in the lower lobes along the posterior costal pleura. Trachea and central airways are patent. No pleural effusion.    MEDIASTINUM: Moderate mitral annular calcifications and degenerative thickening of the anterior mitral valve leaflet. Cardiac chambers are normal in size. No pericardial effusion. No enlarged hilar or mediastinal lymph nodes. Esophagus is decompressed.   Imaged thyroid gland is normal. There is mixed attenuation atheroma throughout the aortic arch, proximal great vessels and in the descending thoracic aorta but no thoracic aortic aneurysm.    CORONARY ARTERY CALCIFICATION: Severe.    UPPER ABDOMEN: Mild distention of the imaged intrahepatic bile ducts, similar to prior. There are no new findings in the imaged upper abdomen.    MUSCULOSKELETAL: Unchanged compression deformities of superior endplate of T3, and the T6 T9, and T12 vertebrae. There is a new fracture deformity of the manubrium. Focal heterogeneity and enlargement of the muscles anterior to the proximal right   humerus.      Impression    IMPRESSION:     1.  Solid heterogeneous attenuation bilateral peripheral lung nodules are stable compared to 07/25/2023. In the setting of known diagnosis of rheumatoid arthritis these are consistent with necrobiotic rheumatoid nodules.  2.  Symmetric mid and upper lung mixed attenuation airspace opacities present in July 2023 have resolved. No findings to suggest acute pneumonitis.  3.  Severe atheromatous coronary calcifications and degenerative thickening of the anterior leaflet of the mitral valve.  4.  New mildly displaced fracture of the manubrium. Unchanged thoracic spine compression deformities.   EKG 12-lead, tracing only   Result  Value Ref Range    Systolic Blood Pressure  mmHg    Diastolic Blood Pressure  mmHg    Ventricular Rate 85 BPM    Atrial Rate 85 BPM    VT Interval 152 ms    QRS Duration 86 ms     ms    QTc 461 ms    P Axis 18 degrees    R AXIS 22 degrees    T Axis 115 degrees    Interpretation ECG       Sinus rhythm  ST & T wave abnormality, consider lateral ischemia  Abnormal ECG  When compared with ECG of 21-OCT-2023 17:38,  No significant change was found       COVID-19 PCR Results          3/27/2022    04:08 3/31/2022    10:27 10/21/2023    17:38   COVID-19 PCR Results   COVID-19 Virus by PCR (External Result) Negative     Negative        SARS CoV2 PCR   Negative       Details          This result is from an external source.             COVID-19 Antibody Results, Testing for Immunity           No data to display              EKG:  Interpreted by GIOVANNY Vo CNP  Time reviewed: 0607  Symptoms at time of EKG: None   Rhythm: normal sinus   Rate: Normal  Axis: Normal  Ectopy: none  Conduction: normal  ST Segments/ T Waves: Non-specific ST-T wave changes  Q Waves: none  Comparison to prior: Unchanged  Clinical Impression: normal EKG    Time Spent on this Encounter   I spent 20 minutes on the unit/floor managing the care of Nazia Goldsmith. 50% of my time was spent at the bedside counseling the patient and/or coordinating care regarding services listed in this note.    GIOVANNY Vo CNP  Hospitalist - House [facility]  Text me on the [employer] [facility]icto for a textback  Text page with web based paging for a callback

## 2023-10-22 NOTE — PHARMACY-VANCOMYCIN DOSING SERVICE
"Pharmacy Vancomycin Initial Note  Date of Service 2023  Patient's  1940  83 year old, female    Indication: Sepsis and Urinary Tract Infection    Current estimated CrCl = Estimated Creatinine Clearance: 31.8 mL/min (based on SCr of 0.94 mg/dL).    Creatinine for last 3 days  10/21/2023:  5:32 PM Creatinine 0.94 mg/dL    Recent Vancomycin Level(s) for last 3 days  No results found for requested labs within last 3 days.      Vancomycin IV Administrations (past 72 hours)                     vancomycin (VANCOCIN) 1,000 mg in 200 mL dextrose intermittent infusion (mg) 1,000 mg New Bag 10/21/23 1941                    Nephrotoxins and other renal medications (From now, onward)      Start     Dose/Rate Route Frequency Ordered Stop    10/22/23 2000  vancomycin (VANCOCIN) 750 mg in sodium chloride 0.9 % 250 mL intermittent infusion         750 mg  over 90 Minutes Intravenous EVERY 24 HOURS 10/22/23 0048      10/22/23 0800  [Held by provider]  furosemide (LASIX) tablet 40 mg        (On hold since today at 0000 until manually unheld; held by Ziggy aYn MDHold Reason: Other)   Note to Pharmacy: PTA Sig:Take 1 tablet (40 mg) by mouth daily      40 mg Oral DAILY 10/22/23 0000      10/22/23 0400  piperacillin-tazobactam (ZOSYN) 3.375 g vial to attach to  mL bag        Note to Pharmacy: For [facility], [facility] and [facility]: For Zosyn-naive patients, use the \"Zosyn initial dose + extended infusion\" order panel.    3.375 g  over 30 Minutes Intravenous EVERY 6 HOURS 10/22/23 0000              Contrast Orders - past 72 hours (72h ago, onward)      Start     Dose/Rate Route Frequency Stop    10/21/23 2020  iopamidol (ISOVUE-370) solution 50 mL         50 mL Intravenous ONCE 10/21/23 2042            [employer]ight[employer] Prediction of Planned Initial Vancomycin Regimen  Loading dose: N/A  Regimen: 750 mg IV every 24 hours.  Start time: 07:41 on 10/22/2023  Exposure target: AUC24 (range)400-600 mg/L.hr   AUC24,ss: 456 " mg/L.hr  Probability of AUC24 > 400: 66 %  Ctrough,ss: 14.1 mg/L  Probability of Ctrough,ss > 20: 17 %  Probability of nephrotoxicity (Lodise KARINA 2009): 9 %        Plan:  Start vancomycin 750 mg IV q24h.   Vancomycin monitoring method: AUC  Vancomycin therapeutic monitoring goal: 400-600 mg*h/L  Pharmacy will check vancomycin levels as appropriate in 1-3 Days.    Serum creatinine levels will be ordered daily for the first week of therapy and at least twice weekly for subsequent weeks.      Tere Mclaughlin, PharmD, BCPS

## 2023-10-22 NOTE — PROGRESS NOTES
MD Notification    Notified Person: MD    Notified Person Name: Dr. Phelps and Hospitalist    Notification Date/Time: October 21, 2023 8:14 PM    Notification Interaction: face to face and vocera    Purpose of Notification: critical trop     Orders Received: Continue aspirin and beta-blocker  Holding diuretics and ARB due to sepsis and CT scan contrast  Transthoracic echocardiogram and troponin tomorrow    Comments:

## 2023-10-22 NOTE — PROGRESS NOTES
[facility]    Medicine Progress Note - Hospitalist Service    Date of Admission:  10/21/2023    Assessment & Plan     Nazia Goldsmith is a 83 year old female with multiple medical problems.  Just over a week ago she was admitted to [facility] from her assisted living facility with acute confusion and was found to have a tiny subdural hematoma.  This resolved on CT scan by the next day and since then she has been at her baseline mental status.  Today she was found at her assisted living facility confused sitting in a chair and not answering appropriately.  She was taken to the emergency room room where she was found to have a fever and leukocytosis.  She has an abnormal chest x-ray which may show pneumonia and a very abnormal urinalysis indicative of infection.  She also has an elevated troponin which is likely due to a type II non-ST elevation MI in the setting of sepsis.       Sepsis due to urinary tract infection- Improving  Encephalopathy due to above-resolved  -On admission presented with confusion, fever, tachycardia, elevated white count at 12.6 with neutrophilic predominance  -UA was done on admission which showed large leukocyte esterase, positive nitrite, WBC  -Blood cultures done on 10/21 have been negative  -Urine culture done on 10/21 have been negative so far and prior cultures have shown E faecium, E. coli, Klebsiella  -COVID-19, influenza A and B and RSV have been negative  -Chest x-ray was done on admission which showed to new right lower lobe pulmonary nodules measuring up to 2 cm and there was concern about heterogeneous opacities favoring pneumonitis versus asymmetric edema  -CT scan of the chest was done which showed bilateral peripheral nodules are stable and in setting of rheumatoid arthritis are consistent with necrobiotic rheumatoid nodules, prior opacities present in July 2023 have been resolved and no evidence of any pneumonitis,  coronary calcification and  new mildly displaced fracture of the manubrium  -Patient has been afebrile since yesterday night, WBC count has trended down and is 11.9 today  -We will continue with vancomycin and Zosyn for now and await culture data    Newly diagnosed displaced fracture of the manubrium  -Noted on CT scan  -Discussed with thoracic surgery and talk with Dr.Rafael strickland and they reviewed the films and is very small and nothing to be done and they will put a small note    Chronic anemia  -I have reviewed her labs and her hemoglobin fluctuates between 8.4-10.9  -Hemoglobin on admission was 10.1 and is 8.4 today and repeat is 9.7  -Fecal occult is positive  -We will continue to monitor and there is no evidence of any acute bleeding with no melena, hematemesis, hematochezia  -B12 levels are normal at 281, serum iron is low at 16 with iron binding capacity of 248 and ferritin levels are normal at 258 and folate levels are pending  --We will check hemoglobin every 8 hours and transfuse for hemoglobin less than 7 or hypotension    Recently diagnosed subdural hematoma on 10/13/23  -Of note patient was admitted to [facility] from 10/12 and was discharged on 10/13 as she presented at that time with a small subdural hematoma  -CT scan of the head on 10/12/2023 Tiny subdural hemorrhage along the anterior left frontal lobe measuring 3 mm in thickness. No adverse intracranial mass effect   -Repeat CT scan done on 10/13/2023 showed that subdural hematoma had resolved  -CT scan of the head done on 10/21/2023 showed no acute intracranial abnormality  -Patient was evaluated by neurosurgery and no surgical intervention was recommended and they recommended to follow as outpatient    NSTEMI likely due to demand  Chronic diastolic heart failure  Hypertension  Mitral valve regurgitation and stenosis  Peripheral vascular disease  Severe pulmonary hypertension  -On admission her initial troponin was 113 and her initial EKG showed ST changes which  have been present in the past  -Repeat troponin did trend down to 84  -She had episode of chest discomfort and was evaluated by house team and they thought that it was due to manubrial fracture and repeat EKG did not show any ST elevation or new changes  -Last echo done on 7/26/2023 showed EF of 55 to 60%, no wall motion changes, moderate MR, moderate mitral stenosis severe pulmonary hypertension  -Last cardiac cath on 7/31/2023 showed diffused diseased LAD with serial lesion extending from the proximal to mid portion of the vessel,  of the left circumflex, serial moderate proximal RCA lesion  -Patient was evaluated by Dr. Dewey and as per his note dated 8/1/2023 given that as patient had no anginal symptoms and given the complexity of her LAD/RCA disease medical management was first option and patient at that time was treated with aspirin Plavix losartan metoprolol amlodipine, spironolactone and Lasix  -Repeat echo is ordered along with repeat troponin-given complexity of lesions I have ordered cardiology consult  -Given that she has underlying severe pulmonary hypertension and we held her diuretics given sepsis I will stop her IV fluids for now as her blood pressure is stable  -We will continue the patient with beta-blocker for now long with statin  - per NS can start dapt   - will d/w cards and start with asa 81 mg daily and if tolerate then start plavix in next 1-2 days     GERD  History of duodenal ulcer and esophageal stricture  -We will continue with PPI    Rheumatoid arthritis   Osteoporosis   Osteoarthritis   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   History of right foot surgery due to rheumatoid arthritis   -We will hold her PTA hydroxychloroquine and leflunomide due to active infection    Chronic hyponatremia likely due to SIADH  -Her baseline sodium runs around 131  -Sodium on admission was 132 and has improved to 133  -We will continue to monitor    Urinary  incontinence  Recurrent UTIs  -We will continue with sole effects of formulary substitute            Diet: Combination Diet Regular Diet Adult    DVT Prophylaxis: Pneumatic Compression Devices  Burch Catheter: Not present  Lines: None     Cardiac Monitoring: None  Code Status: No CPR- Do NOT Intubate      Clinically Significant Risk Factors Present on Admission                  # Hypertension: Home medication list includes antihypertensive(s)  # Chronic heart failure with preserved ejection fraction: heart failure noted on problem list and last echo with EF >50%                Disposition Plan      Expected Discharge Date: 10/23/2023      Destination: inpatient rehabilitation facility              Forrest Leblanc MD  Hospitalist Service  [facility]  Securely message with [employer] (more info)  Text page via [employer] Paging/Directory   ______________________________________________________________________    Interval History     Seen today and she mentioned to me that she has been recurrently having urinary tract infections.  She lives at [facility].  She did tell me that because of her rheumatoid arthritis she thinks that she is not cleaning herself well and that might be the cause of her recurrent UTIs.  She did mention that she had chest discomfort earlier but it had resolved.  She also told me that she has been falling recently.  She did mention she felt short of breath earlier this morning but denied when I saw her.      She also told me the name of her kids, name of the president of [place], month is October and year as 2022        Discussed with son     Physical Exam   Vital Signs: Temp: 97.3  F (36.3  C) Temp src: Oral BP: 127/66 Pulse: 81   Resp: 17 SpO2: 96 % O2 Device: None (Room air) Oxygen Delivery: 1 LPM  Weight: 104 lbs .91 oz        General: Patient appears comfortable and in no acute distress.  HEENT: Head is atraumatic, normocephalic.    Neck: Neck is  supple  Respiratory:ctla  Cardiovascular: Regular rate , S1 and S2 normal with no murmer or rubs or gallops  Abdomen:   soft , non tender , non distended and bowel sound present   Skin: No skin rashes or lesions to inspection or palpation.  Neurologic: No facial droop  Musculoskeletal: Normal Range of motion over upper and lower extremities bilaterally and she has deformities over her hands  Psychiatric: cooperative      Medical Decision Making       Spent in care of patient is 45 minutes and I discussed plan of care in detail with the patient, nurse, reviewed her prior history and discussed plan of care with the patient, family      Data     I have personally reviewed the following data over the past 24 hrs:    11.9 (H)  \   8.4 (L)   / 202     133 (L) 98 15.3 /  86   3.8 21 (L) 0.86 \     Trop: 84 (H) BNP: N/A     Procal: 0.17 (H) CRP: N/A Lactic Acid: 1.0       Ferritin:  258 % Retic:  N/A LDH:  N/A       Imaging results reviewed over the past 24 hrs:   Recent Results (from the past 24 hour(s))   CT Head w/o Contrast    Narrative    EXAM: CT HEAD W/O CONTRAST  LOCATION: [facility]  DATE: 10/21/2023    INDICATION: Confusion  COMPARISON: 10/13/2023.  TECHNIQUE: Routine CT Head without IV contrast. Multiplanar reformats. Dose reduction techniques were used.    FINDINGS:  INTRACRANIAL CONTENTS: No acute intracranial hemorrhage. No hydrocephalus or extra-axial hemorrhage. Mild to moderate global cortical volume loss with expected dilatation of the ventricular system.    Chronic small vessel ischemic changes are stable from recent CT assessment.    VISUALIZED ORBITS/SINUSES/MASTOIDS: No intraorbital abnormality. No paranasal sinus mucosal disease. No middle ear or mastoid effusion.    BONES/SOFT TISSUES: No acute abnormality.      Impression    IMPRESSION:  1.  No acute intracranial abnormality. Chronic changes as above.   Chest XR,  PA & LAT    Narrative    EXAM: XR CHEST 2 VIEWS  LOCATION:   [facility]  DATE: 10/21/2023    INDICATION: fever, confusion  COMPARISON: 10/13/2023      Impression    IMPRESSION: Heart size is normal. There are 2 new right lower lobe pulmonary nodules, measuring up to 2 cm. Left upper lobe nodular opacity abutting the aortic arch redemonstrated. There is increasing prominence of the right hilum. Lung volumes have   diminished with increasing heterogeneous parenchymal opacities. Multifocal pneumonitis favored over asymmetric edema. Further evaluation with chest CT recommended. No associated effusions. Biapical pleural calcifications are redemonstrated.    CT Chest w Contrast    Narrative    EXAM: CT CHEST W CONTRAST  LOCATION: [facility]  DATE: 10/21/2023    INDICATION: bilateral pneumonitis  COMPARISON: CT pulmonary angiogram 07/25/2023  TECHNIQUE: CT chest with IV contrast. Multiplanar reformats were obtained. Dose reduction techniques were used.    CONTRAST: 50  ml Isovue 370    FINDINGS:   LUNGS AND PLEURA: There are multiple heterogeneous attenuation solid lung nodules which are in the peripheral third and/or subpleural lungs bilaterally. A larger representative nodule in the right middle lobe along the anterior costal pleura is 19 x 30   mm (series 4, image 193). Other nodules are present in the lateral segment right middle lobe, right lower lobe superior segment along the mediastinal pleura, in the lateral basal right lower lobe and along the mediastinal pleura of the left upper lobe   and left lower lobe superior segment. Nodules along the mediastinal pleura in the upper lobe have central cavitation. All of these nodules are stable in size. Bilateral mixed attenuation airspace opacities which predominate within the mid and upper lungs   in July 2023 have cleared. No new airspace opacities. Thin bands of atelectasis are present in the lower lobes along the posterior costal pleura. Trachea and central airways are patent. No  pleural effusion.    MEDIASTINUM: Moderate mitral annular calcifications and degenerative thickening of the anterior mitral valve leaflet. Cardiac chambers are normal in size. No pericardial effusion. No enlarged hilar or mediastinal lymph nodes. Esophagus is decompressed.   Imaged thyroid gland is normal. There is mixed attenuation atheroma throughout the aortic arch, proximal great vessels and in the descending thoracic aorta but no thoracic aortic aneurysm.    CORONARY ARTERY CALCIFICATION: Severe.    UPPER ABDOMEN: Mild distention of the imaged intrahepatic bile ducts, similar to prior. There are no new findings in the imaged upper abdomen.    MUSCULOSKELETAL: Unchanged compression deformities of superior endplate of T3, and the T6 T9, and T12 vertebrae. There is a new fracture deformity of the manubrium. Focal heterogeneity and enlargement of the muscles anterior to the proximal right   humerus.      Impression    IMPRESSION:     1.  Solid heterogeneous attenuation bilateral peripheral lung nodules are stable compared to 07/25/2023. In the setting of known diagnosis of rheumatoid arthritis these are consistent with necrobiotic rheumatoid nodules.  2.  Symmetric mid and upper lung mixed attenuation airspace opacities present in July 2023 have resolved. No findings to suggest acute pneumonitis.  3.  Severe atheromatous coronary calcifications and degenerative thickening of the anterior leaflet of the mitral valve.  4.  New mildly displaced fracture of the manubrium. Unchanged thoracic spine compression deformities.

## 2023-10-22 NOTE — PHARMACY-ADMISSION MEDICATION HISTORY
Pharmacist Admission Medication History    Admission medication history is complete. The information provided in this note is only as accurate as the sources available at the time of the update.    Information Source(s): Facility ([facility]/) medication list/MAR via N/A    Pertinent Information: none    Changes made to PTA medication list:  Added: None  Deleted: lidocaine patch  Changed: metoprolol dose    Medication Affordability:       Allergies reviewed with patient and updates made in EHR: no    Medication History Completed By: Nia Martinez [facility] 10/21/2023 7:46 PM    PTA Med List   Medication Sig Last Dose    acetaminophen (TYLENOL) 500 MG tablet Take 1,000 mg by mouth 3 times daily 10/20/2023 at 1400    ferrous gluconate (FERGON) 324 (38 Fe) MG tablet Take 324 mg by mouth daily 10/20/2023 at 2000    furosemide (LASIX) 40 MG tablet Take 1 tablet (40 mg) by mouth daily 10/21/2023 at am    gabapentin (NEURONTIN) 100 MG capsule Take 100 mg by mouth 2 times daily 10/21/2023 at am    hydroxychloroquine (PLAQUENIL) 200 MG tablet Take 200 mg by mouth daily 10/21/2023 at am    leflunomide (ARAVA) 20 MG tablet Take 20 mg by mouth daily 10/21/2023 at am    lidocaine HCl 3 % cream Apply to right shoulder area of pain twice daily as needed 10/21/2023    loperamide (IMODIUM) 2 MG capsule Take 4 mg by mouth daily before breakfast 10/21/2023 at am    losartan (COZAAR) 50 MG tablet Take 1 tablet (50 mg) by mouth daily 10/21/2023 at am    metoprolol succinate ER (TOPROL XL) 50 MG 24 hr tablet Take 75 mg by mouth daily 10/21/2023 at am    multivitamin, therapeutic (THERA-VIT) TABS tablet Take 1 tablet by mouth daily 10/21/2023 at 1400    pantoprazole (PROTONIX) 40 MG EC tablet Take 40 mg by mouth daily 10/21/2023 at am    psyllium (METAMUCIL/KONSYL) capsule Take 2 capsules by mouth daily 10/21/2023 at am    rosuvastatin (CRESTOR) 10 MG tablet Take 1 tablet (10 mg) by mouth every evening 10/20/2023 at pm    solifenacin (VESICARE) 5  MG tablet Take 5 mg by mouth daily 10/21/2023 at am    spironolactone (ALDACTONE) 25 MG tablet Take 12.5 mg by mouth daily 10/21/2023 at am    vitamin D3 (CHOLECALCIFEROL) 50 mcg (2000 units) tablet Take 1 tablet by mouth daily 10/12/2023 at 1400

## 2023-10-22 NOTE — ED PROVIDER NOTES
RECEIVING UNIT ED HANDOFF REVIEW    ED Nurse Handoff Report was reviewed by: Jessica Mercado RN on October 21, 2023 at 10:43 PM

## 2023-10-22 NOTE — PLAN OF CARE
Summary:  Sepsis d/t UTI, pneumonia     DATE & TIME: 10/22/23 @ [id number]    Cognitive Concerns/ Orientation : A&Ox4 forgetful at times    BEHAVIOR & AGGRESSION TOOL COLOR: Green  CIWA SCORE: n/a   ABNL VS/O2: VSS on RA  MOBILITY: Assist of 1 GBW  PAIN MANAGMENT: Denied   DIET: Regular   BOWEL/BLADDER: Incontinent, Purewick in place.  ABNL LAB/BG: Troponin 113, Na 132  DRAIN/DEVICES: R PIV  mL/hr  TELEMETRY RHYTHM: n/a  SKIN: Scattered bruising, callus under left toes, right foot smaller than left d/t the rheumatoid arthritis surgery. Rheumatoid arthritis both arms.  TESTS/PROCEDURES: CT, XR done  D/C DAY/GOALS/PLACE: pending improvement   OTHER IMPORTANT INFO: RRT called for chest pain around 0600

## 2023-10-22 NOTE — H&P
[facility]    History and Physical - Hospitalist Service       Date of Admission:  10/21/2023    Assessment & Plan    Nazia Goldsmith is a 83 year old female with multiple medical problems.  Just over a week ago she was admitted to [facility] from her assisted living facility with acute confusion and was found to have a tiny subdural hematoma.  This resolved on CT scan by the next day and since then she has been at her baseline mental status.  Today she was found at her assisted living facility confused sitting in a chair and not answering appropriately.  She was taken to the emergency room room where she was found to have a fever and leukocytosis.  She has an abnormal chest x-ray which may show pneumonia and a very abnormal urinalysis indicative of infection.  She also has an elevated troponin which is likely due to a type II non-ST elevation MI in the setting of sepsis.    Sepsis   Probable urinary tract infection   Abnormal chest X-ray ? Pneumonia   The patient received vancomycin and Zosyn in the emergency room.  Blood cultures were drawn and urine culture was sent.  CT of the chest was also performed.  Continue IV vancomycin and Zosyn  Follow-up CT scan results and cultures  Repeat labs tomorrow    Acute confusion   I suspect this is due to sepsis but this could be evaluated with MRI and possibly EEG.  Monitor mental status and defer further evaluation for now    Non-ST-elevation myocardial infarction   Chronic diastolic congestive heart failure   Coronary artery disease   Hypertension   Mitral valve regurgitation and stenosis   Peripheral vascular disease   She has no chest pain and does not appear to be in acute heart failure.  Suspected type II non-ST elevation MI due to sepsis.  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   History of right foot surgery due to rheumatoid arthritis   Hold hydroxychloroquine due to infection    Urinary incontinence   Recurrent urinary tract infections   Check PVR's   Antibiotic for urinary tract infection   Continue solifenacin or formulary substitute   Consider consulting the urologist     Chronic hyponatremia 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   Continue to monitor         Diet:  regular   DVT Prophylaxis: Pneumatic Compression Devices  Burch Catheter: Not present  Lines: None     Cardiac Monitoring: None  Code Status:  no code blue     Clinically Significant Risk Factors Present on Admission                  # Hypertension: Home medication list includes antihypertensive(s)  # Chronic heart failure with preserved ejection fraction: heart failure noted on problem list and last echo with EF >50%                Disposition Plan      Expected Discharge Date: 10/23/2023                  Ziggy Yan MD  Hospitalist Service  [facility] [place]  Securely message with [employer] (more info)  Text page via m2fx Paging/Directory     ______________________________________________________________________    Chief Complaint   Confused     History is obtained from the electronic health record, emergency department physician, and patient's family    History of Present Illness   Nazia Goldsmith is a 83 year old female who has a history which include urinary incontinence and recurrent urinary tract infections, coronary artery disease, chronic diastolic heart failure, hypertension, mitral  valve disease, peripheral vascular disease and rheumatoid arthritis.  She lives in an assisted living facility.  She was admitted to [facility] from October 12 to 13 due to acute confusion.  She was found to have a tiny subdural hematoma which had resolved on CT scan the next day.  Since discharge she has been at her baseline mental status until today when the staff at the assisted living facility could not find her and eventually found her sitting in a chair.  To be confused and not responding.  She was awake.  She had not fallen.  She was not giving any specific complaints.  In the emergency room her blood pressure was 132/83 temperature 100.9  F per rectum heart rate 95 respiratory rate 12 are saturation 95%.  Her exam was unremarkable and she was described as alert oriented name place month and year.  EKG showed sinus rhythm with old ST segment and T wave abnormalities in the lateral leads.  Chest x-ray showed a left nodular opacity increasing right hilar prominence bilateral opacities seen.  CT scan recommended.  CT of the head was negative.  Labs notable for sodium 132 procalcitonin 0.13 troponin 113 lactic acid 1.0.  White blood cell count 12.6 thousand hemoglobin 10 g.  UA showed 169 white cells no red cells large amount of leukocyte esterase positive nitrate.  In the emergency room the patient received Zosyn, vancomycin, a liter of normal saline and Tylenol.    Past Medical History    Past Medical History:   Diagnosis Date    Acute on chronic diastolic congestive heart failure (H) 2023    Breast cancer (H) 2010    right stage I    CAD (coronary artery disease)     diffuse disease of LAD and RCA    Diverticulosis of large intestine     Duodenal ulcer without hemorrhage or perforation and without obstruction     Esophageal stricture     Gastroesophageal reflux disease with esophagitis     Grade II diastolic dysfunction     HTN (hypertension)     Mitral valve regurgitation     moderate     Mitral valve stenosis     mild    Osteoarthritis     Osteoporosis     PAD (peripheral artery disease) (H24) 2022    LLE angioplasty    Pulmonary nodules 2020    RML    RA (rheumatoid arthritis) (H)     T12 compression fracture (H)     Tibial plateau fracture     right       Past Surgical History   Past Surgical History:   Procedure Laterality Date    APPENDECTOMY      BLADDER SURGERY      BREAST BIOPSY, RT/LT      CHOLECYSTECTOMY      CV CORONARY ANGIOGRAM N/A 07/31/2023    Procedure: Coronary Angiogram;  Surgeon: Sharmila Lombardo MD;  Location:  HEART CARDIAC CATH LAB    CV RIGHT HEART CATH MEASUREMENTS RECORDED N/A 07/31/2023    Procedure: Right Heart Catheterization;  Surgeon: Sharmila Lombardo MD;  Location:  HEART CARDIAC CATH LAB    FOOT SURGERY Right 2009    HYSTERECTOMY      LUMPECTOMY BREAST      MASTOID SURGERY      TOTAL HIP ARTHROPLASTY Right 2013    TOTAL KNEE ARTHROPLASTY Right 2019       Prior to Admission Medications   Prior to Admission Medications   Prescriptions Last Dose Informant Patient Reported? Taking?   acetaminophen (TYLENOL) 500 MG tablet 10/20/2023 at 1400 Nursing Home Yes Yes   Sig: Take 1,000 mg by mouth 3 times daily   ferrous gluconate (FERGON) 324 (38 Fe) MG tablet 10/20/2023 at 2000 Nursing Home Yes Yes   Sig: Take 324 mg by mouth daily   furosemide (LASIX) 40 MG tablet 10/21/2023 at am Nursing Home No Yes   Sig: Take 1 tablet (40 mg) by mouth daily   gabapentin (NEURONTIN) 100 MG capsule 10/21/2023 at am Nursing Home Yes Yes   Sig: Take 100 mg by mouth 2 times daily   hydroxychloroquine (PLAQUENIL) 200 MG tablet 10/21/2023 at am Nursing Home Yes Yes   Sig: Take 200 mg by mouth daily   leflunomide (ARAVA) 20 MG tablet 10/21/2023 at am Nursing Home Yes Yes   Sig: Take 20 mg by mouth daily   lidocaine HCl 3 % cream 10/21/2023 Nursing Home Yes Yes   Sig: Apply to right shoulder area of pain twice daily as needed   loperamide (IMODIUM) 2 MG capsule 10/21/2023 at am  Nursing Home Yes Yes   Sig: Take 4 mg by mouth daily before breakfast   losartan (COZAAR) 50 MG tablet 10/21/2023 at am Nursing Home No Yes   Sig: Take 1 tablet (50 mg) by mouth daily   metoprolol succinate ER (TOPROL XL) 50 MG 24 hr tablet 10/21/2023 at am Nursing Home Yes Yes   Sig: Take 75 mg by mouth daily   multivitamin, therapeutic (THERA-VIT) TABS tablet 10/21/2023 at 1400 Nursing Home Yes Yes   Sig: Take 1 tablet by mouth daily   ondansetron (ZOFRAN ODT) 4 MG ODT tab prn Nursing Home Yes No   Sig: Take 4 mg by mouth every 8 hours as needed for nausea   pantoprazole (PROTONIX) 40 MG EC tablet 10/21/2023 at am Nursing Home Yes Yes   Sig: Take 40 mg by mouth daily   psyllium (METAMUCIL/KONSYL) capsule 10/21/2023 at am Nursing Home No Yes   Sig: Take 2 capsules by mouth daily   rosuvastatin (CRESTOR) 10 MG tablet 10/20/2023 at pm Nursing Home No Yes   Sig: Take 1 tablet (10 mg) by mouth every evening   solifenacin (VESICARE) 5 MG tablet 10/21/2023 at am Nursing Home Yes Yes   Sig: Take 5 mg by mouth daily   spironolactone (ALDACTONE) 25 MG tablet 10/21/2023 at am Nursing Home Yes Yes   Sig: Take 12.5 mg by mouth daily   vitamin D3 (CHOLECALCIFEROL) 50 mcg (2000 units) tablet 10/12/2023 at 1400 Nursing Home Yes Yes   Sig: Take 1 tablet by mouth daily   zoledronic Acid (RECLAST) 5 MG/100ML SOLN infusion 10/14/2023 Nursing Home Yes No   Sig: Inject 5 mg into the vein once Every year      Facility-Administered Medications: None        Review of Systems    The 10 point Review of Systems is negative other than noted in the HPI or here.     Social History   I have reviewed this patient's social history and updated it with pertinent information if needed.     Allergies   Allergies   Allergen Reactions    Hydrocodone     Methotrexate Unknown    Sulindac Unknown    Triamterene Nausea    Codeine Dizziness    Dyazide [Hydrochlorothiazide W-Triamterene] Nausea and Vomiting    Hydrocodone-Acetaminophen Nausea and Vomiting     Omeprazole Rash    Oxycodone Other (See Comments) and Dizziness     constipation    Ranitidine Rash        Physical Exam   Vital Signs: Temp: (!) 100.9  F (38.3  C) Temp src: Rectal BP: (!) 152/80 Pulse: 85   Resp: 12 SpO2: 93 % O2 Device: None (Room air)    Weight: 0 lbs 0 oz    Constitutional: awake, alert, cooperative, no apparent distress  Eyes: Lids and lashes normal, pupils equal, round, sclera clear, conjunctiva normal  ENT: Normocephalic, without obvious abnormality, atraumatic, oral pharynx with moist mucous membranes, tonsils without erythema or exudates  Respiratory: No increased work of breathing, good air exchange, clear to auscultation bilaterally, no crackles or wheezing  Cardiovascular: regular rate and rhythm, normal S1 and S2, no S3 or S4, and no murmur noted  GI: normal bowel sounds, soft, non-distended, non-tender, no masses palpated  Skin: no rashes and no jaundice  Neurologic: Awake, alert, oriented to name, place and time.  Cranial nerves II-XII are grossly intact.  Motor is 5 out of 5 bilaterally.  Neuropsychiatric: General: normal, calm and normal eye contact    Medical Decision Making       MANAGEMENT DISCUSSED with the following over the past 24 hours: ER physician   NOTE(S)/MEDICAL RECORDS REVIEWED over the past 24 hours: EPIC       Data     I have personally reviewed the following data over the past 24 hrs:    12.6 (H)  \   10.1 (L)   / 224     132 (L) 97 (L) 20.9 /  86   3.8 22 0.94 \     Procal: 0.13 (H) CRP: N/A Lactic Acid: 1.0         Imaging results reviewed over the past 24 hrs:   Recent Results (from the past 24 hour(s))   CT Head w/o Contrast    Narrative    EXAM: CT HEAD W/O CONTRAST  LOCATION: [facility]  DATE: 10/21/2023    INDICATION: Confusion  COMPARISON: 10/13/2023.  TECHNIQUE: Routine CT Head without IV contrast. Multiplanar reformats. Dose reduction techniques were used.    FINDINGS:  INTRACRANIAL CONTENTS: No acute intracranial hemorrhage. No  hydrocephalus or extra-axial hemorrhage. Mild to moderate global cortical volume loss with expected dilatation of the ventricular system.    Chronic small vessel ischemic changes are stable from recent CT assessment.    VISUALIZED ORBITS/SINUSES/MASTOIDS: No intraorbital abnormality. No paranasal sinus mucosal disease. No middle ear or mastoid effusion.    BONES/SOFT TISSUES: No acute abnormality.      Impression    IMPRESSION:  1.  No acute intracranial abnormality. Chronic changes as above.   Chest XR,  PA & LAT    Narrative    EXAM: XR CHEST 2 VIEWS  LOCATION: [facility]  DATE: 10/21/2023    INDICATION: fever, confusion  COMPARISON: 10/13/2023      Impression    IMPRESSION: Heart size is normal. There are 2 new right lower lobe pulmonary nodules, measuring up to 2 cm. Left upper lobe nodular opacity abutting the aortic arch redemonstrated. There is increasing prominence of the right hilum. Lung volumes have   diminished with increasing heterogeneous parenchymal opacities. Multifocal pneumonitis favored over asymmetric edema. Further evaluation with chest CT recommended. No associated effusions. Biapical pleural calcifications are redemonstrated.

## 2023-10-23 NOTE — PROGRESS NOTES
Patient was exubated this morning at approx 1030 to 2L NC, SpO2 97%. BBS wheezing with shallow breaths. No stridor present. *Adhesive remover used to remove ETAD    Armaan Garcia, RRT

## 2023-10-23 NOTE — PROGRESS NOTES
[facility] ICU RESPIRATORY NOTE        Date of Admission: 10/21/2023    Date of Intubation (most recent): 10/22/23    Reason for Mechanical Ventilation: Resp. Failure    Number of Days on Mechanical Ventilation: 1    Met Criteria for Spontaneous Breathing Trial: No    Reason for No Spontaneous Breathing Trial: Per MD    Significant Events Today: Intubation    ABG Results:   Recent Labs   Lab 10/23/23  0234   PH 7.40   PCO2 34*   PO2 170*   HCO3 21   O2PER 60         Current Vent Settings: Vent Mode: CMV/AC  (Continuous Mandatory Ventilation/ Assist Control)  FiO2 (%): 60 %  Resp Rate (Set): 18 breaths/min  Tidal Volume (Set, mL): 350 mL  PEEP (cm H2O): 8 cmH2O  Resp: 17      Skin Assessment: Intact    Plan: Continue on fuull support and wean as patient may tolerate    Meshac C Chante RT on 10/23/2023 at 6:15 AM

## 2023-10-23 NOTE — SIGNIFICANT EVENT
Patient complain SOB around 2300, gave 3L of NC, desated below 80, called RRT, got intubated, eventually transferred to ICU.

## 2023-10-23 NOTE — PROGRESS NOTES
Intubation Note    Date of intubation: 10/22/2023  Time of intubation: 2325  Location of intubation: 6TH Floor  Intubated by: Anesthesia.   Location (cm) at lip: 21@ teeth.    ETT placement confirmed by: CO2 detector.    Darrion Johns, RT  10/22/2023

## 2023-10-23 NOTE — PLAN OF CARE
Patient extubated around 1015 to 2L nasal cannula. VSS. Denies pain. Burch removed, DTV. Tolerating PO. Up to chair with assist of one.

## 2023-10-23 NOTE — PROCEDURES
[facility]    Triple Lumen PICC Placement    Date/Time: 10/23/2023 1:35 AM    Performed by: Tegan Knapp RN  Authorized by: Nghia Wong MD  Indications: vascular access      UNIVERSAL PROTOCOL   Site Marked: Yes  Prior Images Obtained and Reviewed:  Yes  Required items: Required blood products, implants, devices and special equipment available    Patient identity confirmed:  Verbally with patient, arm band, provided demographic data and hospital-assigned identification number  NA - No sedation, light sedation, or local anesthesia  Confirmation Checklist:  Patient's identity using two indicators, relevant allergies, procedure was appropriate and matched the consent or emergent situation and correct equipment/implants were available  Time out: Immediately prior to the procedure a time out was called    Universal Protocol: the Joint Commission Universal Protocol was followed    Preparation: Patient was prepped and draped in usual sterile fashion       ANESTHESIA    Anesthesia:  Local infiltration  Local Anesthetic:  Lidocaine 1% without epinephrine  Anesthetic Total (mL):  1      SEDATION    Patient Sedated: No        Preparation: skin prepped with 2% chlorhexidine  Skin prep agent: skin prep agent completely dried prior to procedure  Sterile barriers: maximum sterile barriers were used: cap, mask, sterile gown, sterile gloves, and large sterile sheet  Hand hygiene: hand hygiene performed prior to central venous catheter insertion  Type of line used: PICC  Catheter type: triple lumen  Lumen type: power PICC and valved  Lumen Identification: Gray, White and Red  Catheter size: 5 Fr  Brand: Bard  Lot number: [id number]  Placement method: MST, ultrasound and tip navigation system  Number of attempts: 1  Difficulty threading catheter: no  Successful placement: yes  Orientation: right    Location: brachial vein (medial)  Arm circumference: adults 10 cm  Extremity circumference:  26  Visible catheter length: 8  Total catheter length: 40  Dressing and securement: adhesive securement device, alcohol impregnated caps, chlorhexidine disc applied, gloves changed prior to final dressing, subcutaneous anchor securement system, sterile dressing applied, hemostatic agent and transparent dressing  Post procedure assessment: placement verified by 3CG technology, blood return through all ports and free fluid flow  PROCEDURE   Patient Tolerance:  Patient tolerated the procedure well with no immediate complications  Disposal: sharps and needle count correct at the end of procedure, needles and guidewire disposed in sharps container

## 2023-10-23 NOTE — PROGRESS NOTES
Pt VSS on ventilator since admission to ICU. Pt was a RRT for respiratory arrest and intubated prior to coming to ICU. Once in ICU, respiratory status stable, though lung sounds course. Some frothy white/cloudy secretions with suction. Fentanyl and versed for sedation. PICC now in place. Pt had BM during RRT. Pt now has montoya in place for I&O. Pt also has NG that is clamped currently.     Pt sons came to ICU post RRT. They have been updated on pt status and cares.

## 2023-10-23 NOTE — ANESTHESIA CARE TRANSFER NOTE
Patient: Nazia Goldsmith    Procedure: * No procedures listed *       Diagnosis: * No pre-op diagnosis entered *  Diagnosis Additional Information: No value filed.    Anesthesia Type:   No value filed.     Note:    Oropharynx: endotracheal tube in place  Level of Consciousness: iatrogenic sedation  Patient oxygen source: placed on vent by ICU team.    Independent Airway: airway patency not satisfactory and stable  Dentition: dentition unchanged  Vital Signs Stable: post-procedure vital signs reviewed and stable  Report to RN Given: handoff report given  Patient transferred to: ICU    ICU Handoff: Call for PAUSE to initiate/utilize ICU HANDOFF, Identified Patient, Identified Responsible Provider, Reviewed the Pertinent Medical History, Discussed Surgical Course, Reviewed Intra-OP Anesthesia Management and Issues during Anesthesia, Set Expectations for Post Procedure Period and Allowed Opportunity for Questions and Acknowledgement of Understanding  Vitals:  Vitals Value Taken Time   BP     Temp     Pulse     Resp     SpO2         Electronically Signed By: GIOVANNY Montaño CRNA  October 22, 2023  11:31 PM

## 2023-10-23 NOTE — H&P
[facility]    History and Physical  [facility] Intensive Care     Date of Admission:  10/21/2023    Assessment & Plan    Nazia Goldsmith is a 83 year old female with multiple medical problems.  Just over a week ago she was admitted to [facility] from her assisted living facility with acute confusion and was found to have a tiny subdural hematoma.  This resolved on CT scan by the next day and since then she has been at her baseline mental status.  Today she was found at her assisted living facility confused sitting in a chair and not answering appropriately.  She was taken to the emergency room room where she was found to have a fever and leukocytosis.  She has an abnormal chest x-ray which may show pneumonia and a very abnormal urinalysis indicative of infection.  She also has an elevated troponin which is likely due to a type II non-ST elevation MI in the setting of sepsis. Today she was found unresponsive with increased work of breathing and flash pulmonary edema. She was intubated and transferred to the ICU.    Neurology:  Previous history of recent subdural: No focal findings on exam upon admission to the ICU  -Monitor and reimage as necessary    Cardiovascular:  Sudden onset of flash pulmonary edema: Patient stabilized with intubation and mechanical ventilation and administration of diuretic.  Diuretics had been stopped previously and patient did have an NSTEMI that was known.  Echocardiogram is scheduled for this morning.  -Has responded well to diuresis and afterload reduction with mechanical ventilation.  Awaiting further cardiology input and echo results.  ECG without evidence of ongoing ischemia, and troponins minimally elevated.    Pulmonary:        Intubated and mechanically ventilated: This does not appear to be a primary respiratory process, with diffuse bilateral infiltrates suddenly appearing on chest x-ray.  Oxygenation now excellent.  -We will obtain morning  chest x-ray and adjust settings as indicated    Renal  No issues: Avoid nephrotoxic agents  -    ID:         Admitted for sepsis likely with urinary source: Currently being treated appropriately with broad-spectrum antibiotics  -    GI  History of esophagitis and esophageal stricture: Despite of report of allergy to proton pump inhibitors will continue her outpatient pantoprazole      Nutrition  N.p.o.: We will consult dietary if it appears that the patient will be intubated more than 24 hours  -    Endocrine:  No issues: Glucose control per ICU protocol  -    Heme/Onc:  No issues  -    DVT Prophylaxis: Pneumatic Compression Devices  GI Prophylaxis: PPI    Restraints: Restraints for medical healing needed: YES    Family update by me today: No    Nghia Wong MD    Time Spent on this Encounter   Billing:  I spent 55 minutes bedside and on the inpatient unit today managing the critical care of Nazia Goldsmith in relation to the issues listed in this note.    Code Status   DNR    Primary Care Physician   Torito Watts    Chief Complaint   See HPI    History is obtained from the electronic health record and rapid response team    History of Present Illness   Nazia Goldsmith is a 83 year old female who presents with  Nazia Goldsmith is a 83 year old female with multiple medical problems.  Just over a week ago she was admitted to [facility] from her assisted living facility with acute confusion and was found to have a tiny subdural hematoma.  This resolved on CT scan by the next day and since then she has been at her baseline mental status.  Today she was found at her assisted living facility confused sitting in a chair and not answering appropriately.  She was taken to the emergency room room where she was found to have a fever and leukocytosis.  She has an abnormal chest x-ray which may show pneumonia and a very abnormal urinalysis indicative of infection.  She also has an elevated troponin which  is likely due to a type II non-ST elevation MI in the setting of sepsis.     Past Medical History    I have reviewed this patient's medical history and updated it with pertinent information if needed.   Past Medical History:   Diagnosis Date    Acute on chronic diastolic congestive heart failure (H) 2023    Breast cancer (H) 2010    right stage I    CAD (coronary artery disease)     diffuse disease of LAD and RCA    Diverticulosis of large intestine     Duodenal ulcer without hemorrhage or perforation and without obstruction     Esophageal stricture     Gastroesophageal reflux disease with esophagitis     Grade II diastolic dysfunction     HTN (hypertension)     Mitral valve regurgitation     moderate    Mitral valve stenosis     mild    Osteoarthritis     Osteoporosis     PAD (peripheral artery disease) (H24) 2022    LLE angioplasty    Pulmonary nodules 2020    RML    RA (rheumatoid arthritis) (H)     T12 compression fracture (H)     Tibial plateau fracture     right       Past Surgical History   I have reviewed this patient's surgical history and updated it with pertinent information if needed.  Past Surgical History:   Procedure Laterality Date    APPENDECTOMY      BLADDER SURGERY      BREAST BIOPSY, RT/LT      CHOLECYSTECTOMY      CV CORONARY ANGIOGRAM N/A 07/31/2023    Procedure: Coronary Angiogram;  Surgeon: Sharmila Lombardo MD;  Location: [facility] CARDIAC CATH LAB    CV RIGHT HEART CATH MEASUREMENTS RECORDED N/A 07/31/2023    Procedure: Right Heart Catheterization;  Surgeon: Sharmila Lombardo MD;  Location:  HEART CARDIAC CATH LAB    FOOT SURGERY Right 2009    HYSTERECTOMY      LUMPECTOMY BREAST      MASTOID SURGERY      TOTAL HIP ARTHROPLASTY Right 2013    TOTAL KNEE ARTHROPLASTY Right 2019       Prior to Admission Medications   Prior to Admission Medications   Prescriptions Last Dose Informant Patient Reported? Taking?   acetaminophen (TYLENOL) 500 MG tablet 10/20/2023 at [address] [place] [address]  Yes Yes   Sig: Take 1,000 mg by mouth 3 times daily   ferrous gluconate (FERGON) 324 (38 Fe) MG tablet 10/20/2023 at [facility] Yes Yes   Sig: Take 324 mg by mouth daily   furosemide (LASIX) 40 MG tablet 10/21/2023 at am Nursing Home No Yes   Sig: Take 1 tablet (40 mg) by mouth daily   gabapentin (NEURONTIN) 100 MG capsule 10/21/2023 at am Nursing Home Yes Yes   Sig: Take 100 mg by mouth 2 times daily   hydroxychloroquine (PLAQUENIL) 200 MG tablet 10/21/2023 at am Nursing Home Yes Yes   Sig: Take 200 mg by mouth daily   leflunomide (ARAVA) 20 MG tablet 10/21/2023 at am Nursing Home Yes Yes   Sig: Take 20 mg by mouth daily   lidocaine HCl 3 % cream 10/21/2023 Nursing Home Yes Yes   Sig: Apply to right shoulder area of pain twice daily as needed   loperamide (IMODIUM) 2 MG capsule 10/21/2023 at am Nursing Home Yes Yes   Sig: Take 4 mg by mouth daily before breakfast   losartan (COZAAR) 50 MG tablet 10/21/2023 at am Nursing Home No Yes   Sig: Take 1 tablet (50 mg) by mouth daily   metoprolol succinate ER (TOPROL XL) 50 MG 24 hr tablet 10/21/2023 at am Nursing Home Yes Yes   Sig: Take 75 mg by mouth daily   multivitamin, therapeutic (THERA-VIT) TABS tablet 10/21/2023 at [facility] Home Yes Yes   Sig: Take 1 tablet by mouth daily   ondansetron (ZOFRAN ODT) 4 MG ODT tab prn Nursing Home Yes No   Sig: Take 4 mg by mouth every 8 hours as needed for nausea   pantoprazole (PROTONIX) 40 MG EC tablet 10/21/2023 at am Nursing Home Yes Yes   Sig: Take 40 mg by mouth daily   psyllium (METAMUCIL/KONSYL) capsule 10/21/2023 at am Nursing Home No Yes   Sig: Take 2 capsules by mouth daily   rosuvastatin (CRESTOR) 10 MG tablet 10/20/2023 at pm Nursing Home No Yes   Sig: Take 1 tablet (10 mg) by mouth every evening   solifenacin (VESICARE) 5 MG tablet 10/21/2023 at am Nursing Home Yes Yes   Sig: Take 5 mg by mouth daily   spironolactone (ALDACTONE) 25 MG tablet 10/21/2023 at am Nursing Home Yes Yes   Sig: Take 12.5 mg by mouth  daily   vitamin D3 (CHOLECALCIFEROL) 50 mcg (2000 units) tablet 10/12/2023 at [facility] Nursing Home Yes Yes   Sig: Take 1 tablet by mouth daily   zoledronic Acid (RECLAST) 5 MG/100ML SOLN infusion 10/14/2023 Nursing Home Yes No   Sig: Inject 5 mg into the vein once Every year      Facility-Administered Medications: None     Allergies   Allergies   Allergen Reactions    Hydrocodone     Methotrexate Unknown    Sulindac Unknown    Triamterene Nausea    Codeine Dizziness    Dyazide [Hydrochlorothiazide W-Triamterene] Nausea and Vomiting    Hydrocodone-Acetaminophen Nausea and Vomiting    Omeprazole Rash    Oxycodone Other (See Comments) and Dizziness     constipation    Ranitidine Rash       Social History   I have reviewed this patient's social history and updated it with pertinent information if needed. Nazia Goldsmith      Family History   I have reviewed this patient's family history and updated it with pertinent information if needed.   No family history on file.    Review of Systems   Review of systems not obtained due to patient factors - critical condition, intubation, and sedation    Physical Exam   Temp: 96.8  F (36  C) Temp src: Axillary Temp  Min: 96.4  F (35.8  C)  Max: 98.5  F (36.9  C) BP: 98/59 Pulse: 89   Resp: 17 SpO2: 96 % O2 Device: Mechanical Ventilator Oxygen Delivery: 1 LPM  Vital Signs with Ranges  Temp:  [96.4  F (35.8  C)-98.5  F (36.9  C)] 96.8  F (36  C)  Pulse:  [] 89  Resp:  [16-36] 17  BP: ()/(52-93) 98/59  FiO2 (%):  [60 %] 60 %  SpO2:  [92 %-100 %] 96 %  101 lbs 6.59 oz    Constitutional: Frail woman with obvious signs of longstanding rheumatoid arthritis  Eyes: PERRL  ENT: Intubated upon arrival in the ICU, ET tube in place and midline  Respiratory: Diffuse rales bilaterally  Cardiovascular: Regular rate and rhythm with no discernible murmur  GI: Abdomen scaphoid, no distention  Genitourinary: No obvious abnormalities, Burch to be placed soon  Musculoskeletal: Multiple joint  abnormalities consistent with longstanding and progressive rheumatoid arthritis  Neurologic: Sedated on ventilator      Data   Results for orders placed or performed during the hospital encounter of 10/21/23 (from the past 24 hour(s))   Basic metabolic panel   Result Value Ref Range    Sodium 133 (L) 135 - 145 mmol/L    Potassium 3.8 3.4 - 5.3 mmol/L    Chloride 98 98 - 107 mmol/L    Carbon Dioxide (CO2) 21 (L) 22 - 29 mmol/L    Anion Gap 14 7 - 15 mmol/L    Urea Nitrogen 15.3 8.0 - 23.0 mg/dL    Creatinine 0.86 0.51 - 0.95 mg/dL    GFR Estimate 66 >60 mL/min/1.73m2    Calcium 8.1 (L) 8.8 - 10.2 mg/dL    Glucose 86 70 - 99 mg/dL   CBC with platelets   Result Value Ref Range    WBC Count 11.9 (H) 4.0 - 11.0 10e3/uL    RBC Count 2.65 (L) 3.80 - 5.20 10e6/uL    Hemoglobin 8.4 (L) 11.7 - 15.7 g/dL    Hematocrit 26.6 (L) 35.0 - 47.0 %     78 - 100 fL    MCH 31.7 26.5 - 33.0 pg    MCHC 31.6 31.5 - 36.5 g/dL    RDW 13.5 10.0 - 15.0 %    Platelet Count 202 150 - 450 10e3/uL   Folate   Result Value Ref Range    Folic Acid 34.1 4.6 - 34.8 ng/mL   Procalcitonin   Result Value Ref Range    Procalcitonin 0.17 (H) <0.05 ng/mL   Troponin T, High Sensitivity (STAT)   Result Value Ref Range    Troponin T, High Sensitivity 84 (H) <=14 ng/L   Lactic Acid STAT   Result Value Ref Range    Lactic Acid 1.5 0.7 - 2.0 mmol/L   Occult blood stool   Result Value Ref Range    Occult Blood Positive (A) Negative   Hemoglobin   Result Value Ref Range    Hemoglobin 9.7 (L) 11.7 - 15.7 g/dL   Troponin T, High Sensitivity   Result Value Ref Range    Troponin T, High Sensitivity 86 (H) <=14 ng/L   Hemoglobin   Result Value Ref Range    Hemoglobin 9.0 (L) 11.7 - 15.7 g/dL   XR Chest Port 1 View    Narrative    EXAM: CHEST SINGLE VIEW PORTABLE  LOCATION: [facility]  DATE/TIME: 10/22/2023 11:25 PM CDT    INDICATION: Acute hypoxic respiratory failure requiring intubation.  COMPARISON: 10/21/2023 at 1800 hours.       Impression    IMPRESSION:   1. Interval placement of an endotracheal tube with distal tip in the trachea approximately 4.5 cm to the lo.  2. Moderately extensive patchy airspace opacities within the central aspects of the upper and mid lungs bilaterally, new since the recent comparison study. This most likely relates to pulmonary edema, but infection could have a similar appearance.   3. No other significant interval change.  4. Nonspecific 2 cm nodular opacity projecting over the lower right lung again noted.   Glucose by meter   Result Value Ref Range    GLUCOSE BY METER POCT 223 (H) 70 - 99 mg/dL   Lactic acid whole blood   Result Value Ref Range    Lactic Acid 4.0 (HH) 0.7 - 2.0 mmol/L   Comprehensive metabolic panel   Result Value Ref Range    Sodium 132 (L) 135 - 145 mmol/L    Potassium 4.1 3.4 - 5.3 mmol/L    Carbon Dioxide (CO2) 16 (L) 22 - 29 mmol/L    Anion Gap 20 (H) 7 - 15 mmol/L    Urea Nitrogen 12.8 8.0 - 23.0 mg/dL    Creatinine 0.79 0.51 - 0.95 mg/dL    GFR Estimate 73 >60 mL/min/1.73m2    Calcium 8.2 (L) 8.8 - 10.2 mg/dL    Chloride 96 (L) 98 - 107 mmol/L    Glucose 211 (H) 70 - 99 mg/dL    Alkaline Phosphatase 102 35 - 104 U/L    AST 33 0 - 45 U/L    ALT 12 0 - 50 U/L    Protein Total 6.4 6.4 - 8.3 g/dL    Albumin 3.2 (L) 3.5 - 5.2 g/dL    Bilirubin Total 0.3 <=1.2 mg/dL   CBC with Platelets & Differential    Narrative    The following orders were created for panel order CBC with Platelets & Differential.  Procedure                               Abnormality         Status                     ---------                               -----------         ------                     CBC with platelets and d...[[id number]]  Abnormal            Final result                 Please view results for these tests on the individual orders.   INR   Result Value Ref Range    INR 1.08 0.85 - 1.15   Partial thromboplastin time   Result Value Ref Range    aPTT 30 22 - 38 Seconds   Troponin T, High Sensitivity  (STAT)   Result Value Ref Range    Troponin T, High Sensitivity 107 (HH) <=14 ng/L   CBC with platelets and differential   Result Value Ref Range    WBC Count 17.8 (H) 4.0 - 11.0 10e3/uL    RBC Count 3.36 (L) 3.80 - 5.20 10e6/uL    Hemoglobin 10.8 (L) 11.7 - 15.7 g/dL    Hematocrit 34.7 (L) 35.0 - 47.0 %     (H) 78 - 100 fL    MCH 32.1 26.5 - 33.0 pg    MCHC 31.1 (L) 31.5 - 36.5 g/dL    RDW 13.8 10.0 - 15.0 %    Platelet Count 230 150 - 450 10e3/uL    % Neutrophils 73 %    % Lymphocytes 15 %    % Monocytes 6 %    Mids % (Monos, Eos, Basos)      % Eosinophils 4 %    % Basophils 1 %    % Immature Granulocytes 1 %    NRBCs per 100 WBC 0 <1 /100    Absolute Neutrophils 13.1 (H) 1.6 - 8.3 10e3/uL    Absolute Lymphocytes 2.7 0.8 - 5.3 10e3/uL    Absolute Monocytes 1.1 0.0 - 1.3 10e3/uL    Mids Abs (Monos, Eos, Basos)      Absolute Eosinophils 0.7 0.0 - 0.7 10e3/uL    Absolute Basophils 0.1 0.0 - 0.2 10e3/uL    Absolute Immature Granulocytes 0.1 <=0.4 10e3/uL    Absolute NRBCs 0.0 10e3/uL   N terminal pro BNP outpatient   Result Value Ref Range    N Terminal Pro BNP Outpatient 4,763 (H) 0 - 1,800 pg/mL   Triple Lumen PICC Placement    Narrative    Tegan Knapp RN     10/23/2023  1:59 AM  [facility]    Triple Lumen PICC Placement    Date/Time: 10/23/2023 1:35 AM    Performed by: Tegan Knapp RN  Authorized by: Nghia Wong MD  Indications: vascular access      UNIVERSAL PROTOCOL   Site Marked: Yes  Prior Images Obtained and Reviewed:  Yes  Required items: Required blood products, implants, devices and special   equipment available    Patient identity confirmed:  Verbally with patient, arm band, provided   demographic data and hospital-assigned identification number  NA - No sedation, light sedation, or local anesthesia  Confirmation Checklist:  Patient's identity using two indicators, relevant   allergies, procedure was appropriate and matched the consent or  emergent   situation and correct equipment/implants were available  Time out: Immediately prior to the procedure a time out was called    Universal Protocol: the Joint Commission Universal Protocol was followed    Preparation: Patient was prepped and draped in usual sterile fashion       ANESTHESIA    Anesthesia:  Local infiltration  Local Anesthetic:  Lidocaine 1% without epinephrine  Anesthetic Total (mL):  1      SEDATION    Patient Sedated: No        Preparation: skin prepped with 2% chlorhexidine  Skin prep agent: skin prep agent completely dried prior to procedure  Sterile barriers: maximum sterile barriers were used: cap, mask, sterile   gown, sterile gloves, and large sterile sheet  Hand hygiene: hand hygiene performed prior to central venous catheter   insertion  Type of line used: PICC  Catheter type: triple lumen  Lumen type: power PICC and valved  Lumen Identification: Gray, White and Red  Catheter size: 5 Fr  Brand: Bard  Lot number: [id number]  Placement method: MST, ultrasound and tip navigation system  Number of attempts: 1  Difficulty threading catheter: no  Successful placement: yes  Orientation: right    Location: brachial vein (medial)  Arm circumference: adults 10 cm  Extremity circumference: 26  Visible catheter length: 8  Total catheter length: 40  Dressing and securement: adhesive securement device, alcohol impregnated   caps, chlorhexidine disc applied, gloves changed prior to final dressing,   subcutaneous anchor securement system, sterile dressing applied,   hemostatic agent and transparent dressing  Post procedure assessment: placement verified by 3CG technology, blood   return through all ports and free fluid flow  PROCEDURE   Patient Tolerance:  Patient tolerated the procedure well with no immediate   complications  Disposal: sharps and needle count correct at the end of procedure, needles   and guidewire disposed in sharps container   CT Head w/o Contrast    Narrative    EXAM: CT HEAD  W/O CONTRAST  LOCATION: [facility]  DATE: 10/23/2023    INDICATION: Acute hypoxic respiratory failure and respiratory arrest  COMPARISON: 10/21/2023.  TECHNIQUE: Routine CT Head without IV contrast. Multiplanar reformats. Dose reduction techniques were used.    FINDINGS:  INTRACRANIAL CONTENTS: No intracranial hemorrhage, extraaxial collection, or mass effect.  No CT evidence of acute infarct. There is scattered diffuse low-attenuation within the periventricular and subcortical white matter consistent with diffuse small   vessel ischemic disease. The ventricular system, basal cisterns and the cortical sulci are consistent with diffuse volume loss.     VISUALIZED ORBITS/SINUSES/MASTOIDS: No intraorbital abnormality. No paranasal sinus mucosal disease. Mastoid air cells and middle ear regions are clear. The patient is status post a partial left mastoidectomy.    BONES/SOFT TISSUES: No acute abnormality.      Impression    IMPRESSION:  1.  No discrete mass lesion, hemorrhage or focal area suggestive of acute infarct.  2.  Stable diffuse age related changes.   XR Abdomen Port 1 View    Narrative    EXAM: XR ABDOMEN PORT 1 VIEW  LOCATION: [facility]  DATE: 10/23/2023    INDICATION: NG placement  COMPARISON: None.      Impression    IMPRESSION: Enteric tube tip in stomach. No visible bowel dilatation. Mitral annular calcification and vascular calcification. Right hip arthroplasty. Degenerative change osseous structures. Nodules right lung base better characterized on CT.   Blood gas arterial with oxyhemoglobin   Result Value Ref Range    pH Arterial 7.40 7.35 - 7.45    pCO2 Arterial 34 (L) 35 - 45 mm Hg    pO2 Arterial 170 (H) 80 - 105 mm Hg    Bicarbonate Arterial 21 21 - 28 mmol/L    Oxyhemoglobin Arterial 97 92 - 100 %    Base Excess/Deficit -3.6 -9.0 - 1.8 mmol/L    FIO2 60     Nolan's Test Yes    Glucose by meter   Result Value Ref Range    GLUCOSE BY METER POCT 151  (H) 70 - 99 mg/dL

## 2023-10-23 NOTE — PROGRESS NOTES
Critical Care  Note      10/23/2023    Name: Nazia Goldsmith MRN#: [id number]   Age: 83 year old YOB: 1940     Hsptl Day# 2  ICU DAY #    MV DAY #             Problem List:   Principal Problem:    Pneumonitis  Active Problems:    Metabolic encephalopathy    Pyelonephritis    Fracture of manubrium, initial encounter for closed fracture         Summary/Hospital Course:   Nazia Goldsmith is a 82 yo assisted living resident F with multiple comorbidities including hypertension, acute on chronic diastolic congestive heart failure, mitral valve regurgitation, mitral valve stenosis, coronary artery disease, GERD with esophagitis, history of duodenal ulcer, peripheral arterial disease, rheumatoid arthritis, T12 compression fracture, and breast cancer.    Patient had recent admission to [facility] 1 week ago due to acute confusion and 3 mm SDH. Follow-up CT scan showed resolution, she was back at her baseline mental status, then, she was discharged.    Again, she was found confused and not answering appropriately.  She was brought to the ER and found to be febrile and to have leukocytosis.  Chest x-ray showed pneumonia and UA with signs of UTI.  She also has an elevated Troponin, likely a type II non-ST elevation MI in the setting of sepsis. She was found unresponsive with increased WOB and flash pulmonary edema. She was intubated and transferred to the ICU.     Interval/overnight events: Admitted earlier this morning for sepsis, ventilatory management and further cares.        Assessment and plan :     Nazia Goldsmith IS a 83 year old female admitted on 10/21/2023 for sepsis due to UTI versus pneumonia, and acute respiratory failure requiring ventilatory support.  I have personally reviewed the daily labs, imaging studies, cultures and discussed the case with referring physician and consulting physicians.     My assessment and plan by system for this patient is as follows:    Neurology/Psychiatry:   1.  Recent history of subdural hematoma on 10/12/2023  - Will monitor any changes in her neuro status  - CT head follow-up showed resolution of SDH on 10/13/2023, but also no intracranial bleeding on 10/21/2023 and 10/23/2023    2. Pain/analgesia/sedation  - No issues at this time. She was extubated yesterday.      Cardiovascular:   1.  Non-STEMI/flash pulmonary edema  - Given diuretics, responded well after diuresis and afterload reduction  - Echo yesterday: Mitral regurgitation, causing edema. Not CAD intervention recommended at this time.   - Per Cardiology: Control BP aggressively goal SBP < 120 to help MR and pulmonary edema. Valve anatomy is not suitable from clipping. There is moderate MS. If medical Rx fails with diuresis and afterload reduction, only option is MVR which she is a poor candidate for given age.   - Carvedilol 12.5 mg BID w meals  -  IV Lasix 40 mg today    Pulmonary/Ventilator Management:   1. Oxygenation/ventilation/mechanics  - Extubated yesterday  - Diffuse bilateral infiltrates  - Follow-up chest x-ray today  - IV Lasix 40 mg today    GI and Nutrition :   1.  History of esophagitis and esophageal strictures  - IV pantoprazole  2. Enteral feedings  - Will start tube feedings if remains intubated.  If extubated, will have bedside nurse swallow study and/or speech evaluation      Renal/Fluids/Electrolytes:   No current issues  -  Creatinine 0.79  -  Monitor electrolytes and replace as needed    Infectious Disease:   1.  Sepsis likely urinary source versus pneumonia  - UTI, will follow cultures  - Zosyn and Vanco started yesterday. Switched to Ceftriaxone yesterday.  - We will follow and narrow coverage as needed.    Endocrine:   1.  No current issues  2.  Blood glucose less than 180  3.  Insulin as needed    Hematology/Oncology:   1.  Chronic anemia, no signs, symptoms of active blood loss  -Keep hemoglobin more than 7, transfuse as needed     IV/Access:   1. Venous access - PICC line  2.  Arterial access - not needed at this time    Plan  -PICC line: Central access required and necessary      ICU Prophylaxis:   1. DVT: LMWH/mechanical  2. Stress Ulcer: PPI/H2 blocker  4. Restraints: Not needed  5. Wound care  - n/a  6. Feedings: speech evaluation  7. Family Update:Sons updated  8. Disposition - ICU cares     Patient seen, examined and discussed with Dr. Giselle Hines MD, Critical Care Attending    Jacki Guy MD, [facility]  Surgical Critical Care Fellow        Code Status: No CPR- Pre-arrest intubation OK               Key Medications:      aspirin  81 mg Oral Daily    chlorhexidine  15 mL Mouth/Throat Q12H    gabapentin  100 mg Oral BID    hydroxychloroquine  200 mg Oral Daily    [Held by provider] leflunomide  20 mg Oral Daily    loperamide  4 mg Oral QAM AC    [Held by provider] losartan  50 mg Oral Daily    metoprolol succinate ER  50 mg Oral Daily    pantoprazole  40 mg Oral Daily    pantoprazole  40 mg Intravenous Daily with breakfast    piperacillin-tazobactam  3.375 g Intravenous Q6H    psyllium  2 capsule Oral Daily    rosuvastatin  10 mg Oral QPM    sodium chloride (PF)  10-40 mL Intracatheter Q7 Days    [Held by provider] spironolactone  12.5 mg Oral Daily    tolterodine ER  2 mg Oral Daily    vancomycin  750 mg Intravenous Q24H      dextrose 5% and 0.9% NaCl 50 mL/hr at 10/23/23 0138    fentaNYL 50 mcg/hr (10/23/23 0030)    midazolam Stopped (10/23/23 0726)              Physical Examination:   Temp:  [96.4  F (35.8  C)-98.5  F (36.9  C)] 96.8  F (36  C)  Pulse:  [] 79  Resp:  [14-36] 18  BP: ()/(52-93) 94/54  FiO2 (%):  [30 %-60 %] 30 %  SpO2:  [92 %-100 %] 100 %      Intake/Output Summary (Last 24 hours) at 10/24/2023 1015  Last data filed at 10/23/2023 2200  Gross per 24 hour   Intake 10.83 ml   Output 325 ml   Net -314.17 ml         Wt Readings from Last 4 Encounters:   10/23/23 46 kg (101 lb 6.6 oz)   10/12/23 45.2 kg (99 lb 10.4 oz)   08/01/23 41.8 kg (92 lb 2.4  oz)     Vent Mode: CPAP/PS  (Continuous positive airway pressure with Pressure Support)  FiO2 (%): 30 %  Resp Rate (Set): 18 breaths/min  Tidal Volume (Set, mL): 350 mL  PEEP (cm H2O): 5 cmH2O  Pressure Support (cm H2O): 5 cmH2O  Resp: 30    Recent Labs   Lab 10/23/23  0618 10/23/23  0234   PH  --  7.40   PCO2  --  34*   PO2  --  170*   HCO3  --  21   O2PER 30 60       GEN: Alert and oriented x 4, short of breath  HEENT: head ncat, sclera anicteric, OP patent, trachea midline   PULM: clear anteriorly    CV/COR: RRR S1S2 no gallop,  No rub, no murmur  ABD: soft nontender, hypoactive bowel sounds, no mass  EXT:  warm and well perfused x4  NEURO: PERRL, no obvious deficits  SKIN: no obvious rash  LINES: clean, dry intact         Data:   All data and imaging reviewed     ROUTINE ICU LABS (Last four results)  CMP  Recent Labs   Lab 10/23/23  0618 10/23/23  0411 10/22/23  2346 10/22/23  2334 10/22/23  0650 10/21/23  1732     --  132*  --  133* 132*   POTASSIUM 3.5  --  4.1  --  3.8 3.8   CHLORIDE 100  --  96*  --  98 97*   CO2 21*  --  16*  --  21* 22   ANIONGAP 14  --  20*  --  14 13   * 151* 211* 223* 86 86   BUN 11.4  --  12.8  --  15.3 20.9   CR 0.75  --  0.79  --  0.86 0.94   GFRESTIMATED 78  --  73  --  66 60*   JAMES 7.7*  --  8.2*  --  8.1* 7.6*   PROTTOTAL 5.5*  --  6.4  --   --   --    ALBUMIN 2.8*  --  3.2*  --   --   --    BILITOTAL 0.3  --  0.3  --   --   --    ALKPHOS 78  --  102  --   --   --    AST 37  --  33  --   --   --    ALT 10  --  12  --   --   --      CBC  Recent Labs   Lab 10/23/23  0618 10/22/23  2346 10/22/23  2035 10/22/23  1411 10/22/23  0650 10/21/23  1732   WBC 13.4* 17.8*  --   --  11.9* 12.6*   RBC 2.65* 3.36*  --   --  2.65* 3.12*   HGB 8.4* 10.8* 9.0* 9.7* 8.4* 10.1*   HCT 26.9* 34.7*  --   --  26.6* 31.2*   * 103*  --   --  100 100   MCH 31.7 32.1  --   --  31.7 32.4   MCHC 31.2* 31.1*  --   --  31.6 32.4   RDW 13.5 13.8  --   --  13.5 13.6    230  --   --  202  "224     INR  Recent Labs   Lab 10/22/23  2346   INR 1.08     Arterial Blood Gas  Recent Labs   Lab 10/23/23  0618 10/23/23  0234   PH  --  7.40   PCO2  --  34*   PO2  --  170*   HCO3  --  21   O2PER 30 60       All cultures:  No results for input(s): \"CULT\" in the last 168 hours.  Recent Results (from the past 24 hour(s))   XR Chest Port 1 View    Narrative    EXAM: CHEST SINGLE VIEW PORTABLE  LOCATION: [facility]  DATE/TIME: 10/22/2023 11:25 PM CDT    INDICATION: Acute hypoxic respiratory failure requiring intubation.  COMPARISON: 10/21/2023 at 1800 hours.      Impression    IMPRESSION:   1. Interval placement of an endotracheal tube with distal tip in the trachea approximately 4.5 cm to the lo.  2. Moderately extensive patchy airspace opacities within the central aspects of the upper and mid lungs bilaterally, new since the recent comparison study. This most likely relates to pulmonary edema, but infection could have a similar appearance.   3. No other significant interval change.  4. Nonspecific 2 cm nodular opacity projecting over the lower right lung again noted.   CT Head w/o Contrast    Narrative    EXAM: CT HEAD W/O CONTRAST  LOCATION: [facility]  DATE: 10/23/2023    INDICATION: Acute hypoxic respiratory failure and respiratory arrest  COMPARISON: 10/21/2023.  TECHNIQUE: Routine CT Head without IV contrast. Multiplanar reformats. Dose reduction techniques were used.    FINDINGS:  INTRACRANIAL CONTENTS: No intracranial hemorrhage, extraaxial collection, or mass effect.  No CT evidence of acute infarct. There is scattered diffuse low-attenuation within the periventricular and subcortical white matter consistent with diffuse small   vessel ischemic disease. The ventricular system, basal cisterns and the cortical sulci are consistent with diffuse volume loss.     VISUALIZED ORBITS/SINUSES/MASTOIDS: No intraorbital abnormality. No paranasal sinus mucosal " disease. Mastoid air cells and middle ear regions are clear. The patient is status post a partial left mastoidectomy.    BONES/SOFT TISSUES: No acute abnormality.      Impression    IMPRESSION:  1.  No discrete mass lesion, hemorrhage or focal area suggestive of acute infarct.  2.  Stable diffuse age related changes.   XR Abdomen Port 1 View    Narrative    EXAM: XR ABDOMEN PORT 1 VIEW  LOCATION: [facility]  DATE: 10/23/2023    INDICATION: NG placement  COMPARISON: None.      Impression    IMPRESSION: Enteric tube tip in stomach. No visible bowel dilatation. Mitral annular calcification and vascular calcification. Right hip arthroplasty. Degenerative change osseous structures. Nodules right lung base better characterized on CT.   XR Chest Port 1 View    Narrative    CHEST ONE VIEW PORTABLE  10/23/2023 6:07 AM       INDICATION: Flash pulmonary edema.    COMPARISON: 10/22/2023       Impression    IMPRESSION: Marked improvement in bilateral perihilar pulmonary  infiltrates when compared to previous. Endotracheal tube remains in  good position above the lo. New NG tube in the stomach. New right  PICC line tip is in the low SVC, in good position.         Billing: This patient is critically ill: Yes. Total critical care time today 50 min, excluding procedures.

## 2023-10-23 NOTE — CODE/RAPID RESPONSE
[facility]  House AMADOU RRT Note  10/22/2023   Time called: 2252  RRT called for: Hypoxia  Code status: Special Code    Assessment & Plan   Patient is an 83 year old female with PMH significant for recent tiny subdural hematoma which has since resolved on CT, RA, GERD, pulmonary nodule, normocytic anemia, urinary incontinence, chronic hyponatremia, mitral regurgitation and PVD who was admitted with sepsis likely urinary source and NSTEMI type II due to sepsis. Patient had an incidental finding of manubrial fracture and was seen by thoracic surgery who recommended no intervention. Patient was started on zosyn on admission and vancomycin was started this evening.    I was paged to the bedside to evaluate Ms. Nazia Goldsmith for an acute episode of hypoxia.    RRT RN called me on my way to the RRT and informed me that SpO2 was 75% so I asked for RN to begin rescue breathing. I also asked that family be called. At that time the patient was DNR/DNI. I arrived just after this and assessed patient. Rescue breathing was effective in improving SpO2 to 96% but patient appeared to be making very little effort on her own. Patinet was obtunded and unresponsive to painful stimuli. PERRLA. Patient was warm and dry with 2+ pulses in extremities. Mottling had begun to BLE but was distal. While assessing the patient the son called and I spoke to him. He clarified that patient should be intubated per her wishes but not resuscitated. I clarified that the patient would likely need intubation if that was the case and he said that she would want to be intubated until the cause of her respiratory failure was known. I changed her order to DNR/Pre-arrest intubation OK. Patient's son stated that he would make his way to the hospital.     Anesthesia was paged overhead and patient was summarily intubated without complication. See CRNA note. Patient was hemodynamically stable so CXR was done on the floor immediately  following intubation. This appeared to show diffuse bilateral lobe pulmonary edema versus infiltrate so I ordered 40mg IV lasix and patient was transported to ICU in bed. I was called to another RRT on the 8th floor and then presented to ICU to give sign-out shortly after the patient arrived to room 354.    Diagnosis:  -- Acute hypoxic respiratory failure  -- Pulmonary edema versus infiltrate  Clinically the airspace opacities likely represent a flash pulmonary edema picture as the patient had such a rapid decompensation. Patient's lasix was held on admission due to sepsis and concern for dehydration/hypotension.     Plan:  -- Intubation  -- STAT CXR  -- Furosemide 40mg IV once  -- Labs:   *troponin   *CBC   *CMP   *ABG   *PTT   *INR   *Lactic acid   *BNP    At the conclusion of this RRT patient was hemodynamically stable and will transfer to ICU    Her history is significant for:  Past Medical History:   Diagnosis Date    Acute on chronic diastolic congestive heart failure (H) 2023    Breast cancer (H) 2010    right stage I    CAD (coronary artery disease)     diffuse disease of LAD and RCA    Diverticulosis of large intestine     Duodenal ulcer without hemorrhage or perforation and without obstruction     Esophageal stricture     Gastroesophageal reflux disease with esophagitis     Grade II diastolic dysfunction     HTN (hypertension)     Mitral valve regurgitation     moderate    Mitral valve stenosis     mild    Osteoarthritis     Osteoporosis     PAD (peripheral artery disease) (H24) 2022    LLE angioplasty    Pulmonary nodules 2020    RML    RA (rheumatoid arthritis) (H)     T12 compression fracture (H)     Tibial plateau fracture     right     Past Surgical History:   Procedure Laterality Date    APPENDECTOMY      BLADDER SURGERY      BREAST BIOPSY, RT/LT      CHOLECYSTECTOMY      CV CORONARY ANGIOGRAM N/A 07/31/2023    Procedure: Coronary Angiogram;  Surgeon: Sharmila Lombardo MD;  Location: [facility]  CARDIAC CATH LAB    CV RIGHT HEART CATH MEASUREMENTS RECORDED N/A 07/31/2023    Procedure: Right Heart Catheterization;  Surgeon: Sharmila Lombardo MD;  Location:  HEART CARDIAC CATH LAB    FOOT SURGERY Right 2009    HYSTERECTOMY      LUMPECTOMY BREAST      MASTOID SURGERY      TOTAL HIP ARTHROPLASTY Right 2013    TOTAL KNEE ARTHROPLASTY Right 2019       Review of Systems   The 10 point Review of Systems is negative other than noted in the HPI or here.     Allergies   Allergies   Allergen Reactions    Hydrocodone     Methotrexate Unknown    Sulindac Unknown    Triamterene Nausea    Codeine Dizziness    Dyazide [Hydrochlorothiazide W-Triamterene] Nausea and Vomiting    Hydrocodone-Acetaminophen Nausea and Vomiting    Omeprazole Rash    Oxycodone Other (See Comments) and Dizziness     constipation    Ranitidine Rash       Physical Exam   Physical Exam  Constitutional:       General: She is in acute distress.      Comments: Obtunded   HENT:      Head: Normocephalic.      Mouth/Throat:      Mouth: Mucous membranes are moist.   Eyes:      Pupils: Pupils are equal, round, and reactive to light.   Cardiovascular:      Rate and Rhythm: Regular rhythm. Tachycardia present.      Pulses: Normal pulses.   Pulmonary:      Effort: Respiratory distress present.      Breath sounds: Rhonchi present.   Abdominal:      General: Abdomen is flat.      Palpations: Abdomen is soft.   Musculoskeletal:      Right lower leg: No edema.      Left lower leg: No edema.   Skin:     General: Skin is warm and dry.      Capillary Refill: Capillary refill takes less than 2 seconds.         Vital Signs with Ranges:  Temp:  [97.3  F (36.3  C)-98.5  F (36.9  C)] 98.5  F (36.9  C)  Pulse:  [75-99] 99  Resp:  [16-18] 18  BP: (108-147)/(52-78) 147/77  SpO2:  [92 %-98 %] 92 %  I/O last 3 completed shifts:  In: 240 [P.O.:240]  Out: 400 [Urine:400]    Data   Results for orders placed or performed during the hospital encounter of 10/21/23 (from the  past 24 hour(s))   EKG 12-lead, tracing only   Result Value Ref Range    Systolic Blood Pressure  mmHg    Diastolic Blood Pressure  mmHg    Ventricular Rate 85 BPM    Atrial Rate 85 BPM    DC Interval 152 ms    QRS Duration 86 ms     ms    QTc 461 ms    P Axis 18 degrees    R AXIS 22 degrees    T Axis 115 degrees    Interpretation ECG       Sinus rhythm  ST & T wave abnormality, consider lateral ischemia  Abnormal ECG  When compared with ECG of 21-OCT-2023 17:38,  No significant change was found     Basic metabolic panel   Result Value Ref Range    Sodium 133 (L) 135 - 145 mmol/L    Potassium 3.8 3.4 - 5.3 mmol/L    Chloride 98 98 - 107 mmol/L    Carbon Dioxide (CO2) 21 (L) 22 - 29 mmol/L    Anion Gap 14 7 - 15 mmol/L    Urea Nitrogen 15.3 8.0 - 23.0 mg/dL    Creatinine 0.86 0.51 - 0.95 mg/dL    GFR Estimate 66 >60 mL/min/1.73m2    Calcium 8.1 (L) 8.8 - 10.2 mg/dL    Glucose 86 70 - 99 mg/dL   CBC with platelets   Result Value Ref Range    WBC Count 11.9 (H) 4.0 - 11.0 10e3/uL    RBC Count 2.65 (L) 3.80 - 5.20 10e6/uL    Hemoglobin 8.4 (L) 11.7 - 15.7 g/dL    Hematocrit 26.6 (L) 35.0 - 47.0 %     78 - 100 fL    MCH 31.7 26.5 - 33.0 pg    MCHC 31.6 31.5 - 36.5 g/dL    RDW 13.5 10.0 - 15.0 %    Platelet Count 202 150 - 450 10e3/uL   Folate   Result Value Ref Range    Folic Acid 34.1 4.6 - 34.8 ng/mL   Procalcitonin   Result Value Ref Range    Procalcitonin 0.17 (H) <0.05 ng/mL   Troponin T, High Sensitivity (STAT)   Result Value Ref Range    Troponin T, High Sensitivity 84 (H) <=14 ng/L   Lactic Acid STAT   Result Value Ref Range    Lactic Acid 1.5 0.7 - 2.0 mmol/L   Occult blood stool   Result Value Ref Range    Occult Blood Positive (A) Negative   Hemoglobin   Result Value Ref Range    Hemoglobin 9.7 (L) 11.7 - 15.7 g/dL   Troponin T, High Sensitivity   Result Value Ref Range    Troponin T, High Sensitivity 86 (H) <=14 ng/L   Hemoglobin   Result Value Ref Range    Hemoglobin 9.0 (L) 11.7 - 15.7 g/dL    XR Chest Port 1 View    Narrative    EXAM: CHEST SINGLE VIEW PORTABLE  LOCATION: [facility]  DATE/TIME: 10/22/2023 11:25 PM CDT    INDICATION: Acute hypoxic respiratory failure requiring intubation.  COMPARISON: 10/21/2023 at 1800 hours.      Impression    IMPRESSION:   1. Interval placement of an endotracheal tube with distal tip in the trachea approximately 4.5 cm to the lo.  2. Moderately extensive patchy airspace opacities within the central aspects of the upper and mid lungs bilaterally, new since the recent comparison study. This most likely relates to pulmonary edema, but infection could have a similar appearance.   3. Nonspecific 2 cm nodular opacity projecting over the lower right lung again noted.   Glucose by meter   Result Value Ref Range    GLUCOSE BY METER POCT 223 (H) 70 - 99 mg/dL   Lactic acid whole blood   Result Value Ref Range    Lactic Acid 4.0 (HH) 0.7 - 2.0 mmol/L   CBC with Platelets & Differential    Narrative    The following orders were created for panel order CBC with Platelets & Differential.  Procedure                               Abnormality         Status                     ---------                               -----------         ------                     CBC with platelets and d...[[id number]]  Abnormal            Final result                 Please view results for these tests on the individual orders.   CBC with platelets and differential   Result Value Ref Range    WBC Count 17.8 (H) 4.0 - 11.0 10e3/uL    RBC Count 3.36 (L) 3.80 - 5.20 10e6/uL    Hemoglobin 10.8 (L) 11.7 - 15.7 g/dL    Hematocrit 34.7 (L) 35.0 - 47.0 %     (H) 78 - 100 fL    MCH 32.1 26.5 - 33.0 pg    MCHC 31.1 (L) 31.5 - 36.5 g/dL    RDW 13.8 10.0 - 15.0 %    Platelet Count 230 150 - 450 10e3/uL    % Neutrophils 73 %    % Lymphocytes 15 %    % Monocytes 6 %    Mids % (Monos, Eos, Basos)      % Eosinophils 4 %    % Basophils 1 %    % Immature Granulocytes 1 %    NRBCs  per 100 WBC 0 <1 /100    Absolute Neutrophils 13.1 (H) 1.6 - 8.3 10e3/uL    Absolute Lymphocytes 2.7 0.8 - 5.3 10e3/uL    Absolute Monocytes 1.1 0.0 - 1.3 10e3/uL    Mids Abs (Monos, Eos, Basos)      Absolute Eosinophils 0.7 0.0 - 0.7 10e3/uL    Absolute Basophils 0.1 0.0 - 0.2 10e3/uL    Absolute Immature Granulocytes 0.1 <=0.4 10e3/uL    Absolute NRBCs 0.0 10e3/uL     COVID-19 PCR Results          3/27/2022    04:08 3/31/2022    10:27 10/21/2023    17:38   COVID-19 PCR Results   COVID-19 Virus by PCR (External Result) Negative     Negative        SARS CoV2 PCR   Negative       Details          This result is from an external source.             COVID-19 Antibody Results, Testing for Immunity           No data to display              EKG:  Interpreted by GIOVANNY Vo, CNP  Time reviewed: 2311  Symptoms at time of EKG: Patient unresponsive   Rhythm: normal sinus   Rate: Tachycardia  Axis: Normal  Ectopy: none  Conduction: normal  ST Segments/ T Waves: No acute ischemic changes  Q Waves: none  Comparison to prior: Sinus tachycardia has replaced sinus rhythm  Clinical Impression: normal EKG    Time Spent on this Encounter   I spent 60 minutes of critical care time on the unit/floor managing the care of Nazia Goldsmith. Upon evaluation, this patient had a high probability of imminent or life-threatening deterioration due to acute hypoxic respiratory failure, which required my direct attention, intervention, and personal management. 100% of my time was spent at the bedside counseling the patient and/or coordinating care regarding services listed in this note.    GIOVANNY Vo, CNP  Hospitalist - House [facility]  Text me on the [employer] benedicto for a textback  Text page with web based paging for a callback

## 2023-10-23 NOTE — PROGRESS NOTES
Antimicrobial Stewardship Team Note    Antimicrobial Stewardship Program - A joint venture between [facility] and [facility] [employer] to optimize antibiotic management.     Patient: Nazia Goldsmith  MRN: [id number]  Allergies: Hydrocodone, Methotrexate, Sulindac, Triamterene, Codeine, Dyazide [hydrochlorothiazide w-triamterene], Hydrocodone-acetaminophen, Omeprazole, Oxycodone, and Ranitidine    Brief Summary: Nazia Goldsmith is a 83 year old female who presented to the ED on 10/21/23 with AMS likely from sepsis due to a UTI and possible pneumonia.    On admission, patient was found to have a fever (100.9  F) and leukocytosis.  Patient's procalcitonin was 0.13 ng/mL, indicative of low risk for systemic infection.  Her chest x-ray was abnormal, showing a left nodular opacity and increasing right hilar prominence bilateral opacities.  Patient's urinalysis was also very abnormal, indicative of infection.  Her troponin was elevated likely from a type II NSTEMI resulting from sepsis.  Patient was started on vancomycin and zosyn on 10/21/23 in the ED.    Of note, patient's urine culture from 10/12/23 was positive for E faecium resistant to ampicillin and levofloxacin.  Susceptible to vancomycin. Blood cultures had no growth as of 10/22/23.  Urine culture positive for e. Coli (ESBL negative), pan-sensitive.    10/21/23 Chest CT showed no findings to suggest acute pneumonitis. 10/23/23 patient was found unresponsive with increased work of breathing and flash pulmonary edema.  She was intubated after complaining of SOB around 2300.  Patient was later extubated at approximately 1030. CXR 10/23/23 showed marked improvement in bilateral perihilar pulmonary infiltrates when compared to previous CXR.           Active Anti-infective Medications   (From admission, onward)                 Start     Stop    10/22/23 2000  vancomycin (VANCOCIN) injection  750 mg,   Intravenous,   EVERY 24 HOURS         Sepsis, Urinary Tract Infection       --    10/22/23 0800  hydroxychloroquine  200 mg,   Oral,   DAILY        RA       --    10/22/23 0400  piperacillin-tazobactam  3.375 g,   Intravenous,   EVERY 6 HOURS        Sepsis       --                  Assessment: E coli UTI  Patient presented with concern for sepsis due to UTI and possibly pneumonia.  Urine culture was positive for E.coli (ESBL negative).  CXR has been improving.  Still awaiting blood culture growth, no growth to date.  She is currently on day 3 of vancomycin and zosyn.      Pulmonary infiltrates likely caused from flash pulmonary edema.  Chest CT showed no findings to suggest acute pneumonitis.  Therefore, no need to cover for CAP.   Based on the urine culture susceptibility results, ceftriaxone provides adequate coverage for the UTI, as well as possible bacteremia and pneumonia. Reasonable to de-escalate antibiotics at this time to target E coli, recommend avoiding Zosyn in setting of heart failure exacerbation given sodium content. Additionally, combination of Zosyn and vancomycin can lead to nephrotoxicity so recommend avoiding prolonged course in patient with decreased renal function.    Recommendations:  Discontinue vancomycin and zosyn.  Initiate ceftriaxone 2g IV q24 hr to complete 7 day course.  Okay to transition to po antibiotics to complete course when able.    Discussed with ID Staff Dr. Amparo Dewey MD & Marisol Benson [facility]  Tatiana Francisco, 3rd year pharmacy student    Vital Signs/Clinical Features:  Vitals         10/21 0700  10/22 0659 10/22 0700  10/23 0659 10/23 0700  10/23 1409   Most Recent      Temp ( F) 97.7 -  100.9    96.4 -  98.5    97.3 -  97.5     97.3 (36.3) 10/23 1200    Pulse 70 -  95    77 -  110    68 -  101     80 10/23 1300    Resp 12 -  18    14 -  36    11 -  18     14 10/23 1300    /46 -  152/80    91/54 -  165/93    85/46 -  159/89     105/52 10/23 1300    SpO2 (%) 93 -  95    92 -  100    97 -  100     99 10/23  1300            Labs  Estimated Creatinine Clearance: 40.5 mL/min (based on SCr of 0.75 mg/dL).  Recent Labs   Lab Test 10/13/23  1004 10/13/23  1250 10/21/23  1732 10/22/23  0650 10/22/23  2346 10/23/23  0618   CR 0.90 0.91 0.94 0.86 0.79 0.75       Recent Labs   Lab Test 10/12/23  2127 10/13/23  1004 10/21/23  1732 10/22/23  0650 10/22/23  1411 10/22/23  2035 10/22/23  2346 10/23/23  0618   WBC 15.3* 8.0 12.6* 11.9*  --   --  17.8* 13.4*   HGB 10.9* 10.1* 10.1* 8.4* 9.7* 9.0* 10.8* 8.4*   HCT 33.4* 31.4* 31.2* 26.6*  --   --  34.7* 26.9*    100 100 100  --   --  103* 102*    202 224 202  --   --  230 195       Recent Labs   Lab Test 03/29/23  1415 07/19/23  0725 07/25/23  0847 08/30/23  1546 10/12/23  2127 10/22/23  2346 10/23/23  0618   BILITOTAL 0.4 0.3 0.3  --  0.3 0.3 0.3   ALKPHOS 74 74 88  --  98 102 78   ALBUMIN 3.9 3.9 3.6 3.9 3.9 3.2* 2.8*   AST 37* 29 34  --  23 33 37   ALT 13 9 14  --  13 12 10       Recent Labs   Lab Test 11/26/21  1520 05/11/22  1614 09/14/22  1036 07/25/23  0841 07/31/23  1306 10/12/23  2127 10/12/23  2226 10/13/23  1004 10/21/23  1732 10/21/23  1734 10/22/23  0650 10/22/23  1036 10/22/23  2346 10/23/23  0647   PCAL  --   --   --   --   --  0.12*  --  0.17* 0.13*  --  0.17*  --   --   --    LACT  --   --   --    < > 0.3  --  0.8  --   --  1.0  --  1.5 4.0* 1.0   CRP  --  0.6  --   --   --   --   --   --   --   --   --   --   --   --    CRPI  --   --  9.90*  --   --   --   --   --   --   --   --   --   --   --    SED 80* 48*  --   --   --   --   --   --   --   --   --   --   --   --     < > = values in this interval not displayed.             Culture Results:  7-Day Micro Results       Procedure Component Value Units Date/Time    Blood Culture Hand, Left [[id number]]  (Normal) Collected: 10/21/23 1936    Order Status: Completed Lab Status: Preliminary result Updated: 10/22/23 3931    Specimen: Blood from Hand, Left      Culture No growth after 1 day    Narrative:       Only an Aerobic Blood Culture Bottle was collected, interpret results with caution.       Blood Culture Arm, Left [[id number]]  (Normal) Collected: 10/21/23 1929    Order Status: Completed Lab Status: Preliminary result Updated: 10/22/23 2331    Specimen: Blood from Arm, Left      Culture No growth after 1 day    Urine Culture [[id number]]  (Abnormal)  (Susceptibility) Collected: 10/21/23 1719    Order Status: Completed Lab Status: Final result Updated: 10/23/23 0224    Specimen: Urine, Catheter      Culture >100,000 CFU/mL Escherichia coli    Susceptibility       Escherichia coli (1)       Antibiotic Interpretation Sensitivity   Method Status    Ampicillin Susceptible 4 ug/mL TIFFANIE Final    Ampicillin/ Sulbactam Susceptible <=2 ug/mL TIFFANIE Final    Piperacillin/Tazobactam Susceptible <=4 ug/mL TIFFANIE Final    Cefazolin Susceptible <=4 ug/mL TIFFANIE Final     Cefazolin TIFFANIE breakpoints are for the treatment of uncomplicated urinary tract infections. For the treatment of systemic infections, please contact the laboratory for additional testing.       Cefoxitin Susceptible <=4 ug/mL TIFFANIE Final    Ceftazidime Susceptible <=1 ug/mL TIFFANIE Final    Ceftriaxone Susceptible <=1 ug/mL TIFFANIE Final    Cefepime Susceptible <=1 ug/mL TIFFANIE Final    Extended Spectrum Beta-Lactamase  [*]  ESBL Negative Negative ug/mL TIFFANIE Final    Meropenem  [*]  Susceptible <=0.25 ug/mL TIFFANIE Final    Amikacin  [*]  Susceptible <=2 ug/mL TIFFANIE Final    Gentamicin Susceptible <=1 ug/mL TIFFANIE Final    Tobramycin Susceptible <=1 ug/mL TIFFANIE Final    Ciprofloxacin Susceptible <=0.25 ug/mL TIFFANIE Final    Levofloxacin Susceptible <=0.12 ug/mL TIFFANIE Final    Nitrofurantoin Susceptible <=16 ug/mL TIFFANIE Final    Trimethoprim/Sulfamethoxazole Susceptible <=1/19 ug/mL TIFFANIE Final               [*]  Suppressed Antibiotic                           Recent Labs   Lab Test 08/22/23  2005 09/07/23  1115 09/21/23  0900 10/12/23  2340 10/21/23  1719   URINEPH 6.0 6.0 6.0 5.0 5.5   NITRITE  Positive* Positive* Positive* Negative Positive*   LEUKEST Large* Large* Large* Negative Large*   WBCU 180* >182* >182* <1 169*                         Imaging: Echocardiogram Limited    Result Date: 10/23/2023  [id number]^LEXIE^ESTER^GRACE  [facility] Echocardiography [facility] [address] [place] [address]  Name: WALTER MORENO MRN: [id number] : 1940 Study Date: 10/23/2023 08:30 AM Age: 83 yrs Gender: Female Patient Location: [facility] Reason For Study: MI - Subendocardial Ordering Physician: ESTER OWEN Referring Physician: Torito Watts Performed By: Suze Farnsworth  BSA: 1.4 m2 Height: 62 in Weight: 101 lb HR: 71 BP: 106/46 mmHg ______________________________________________________________________________ Procedure Limited Portable Echo Adult. Optison (NDC #3549-6677) given intravenously. ______________________________________________________________________________ Interpretation Summary  This was a limited echocardiogram done for pulmonary edema.  LV size is normal. Normal LV systolic function. Borderline mild inferior inferolateral hypokinesis is noted. Normal RV size and systolic function. Heavy mitral annular calcification with 3+ mitral regurgitation. Possibly underestimated. There is moderate mitral stenosis with mean inflow gradient of 7 to 8 mmHg. Severe left atrial enlargement. Mild IVC dilatation consistent with increased filling pressures.  Patient is intubated. No significant changes compared to the prior study from 2023. ______________________________________________________________________________ Left Ventricle The left ventricle is normal in size. The visual ejection fraction is 55-60%. There is mild inferior and inferolateral wall hypokinesis.  Right Ventricle The right ventricle is normal in size and function.  Atria The left atrium is severely dilated. Right atrial size is normal.  Mitral Valve There is moderate to  severe mitral annular calcification. The mitral valve leaflets are severely thickened. There is moderately severe (3+) mitral regurgitation. Increased mitral valve velocity. The peak mitral valve gradient is 13.1 mmHg. The mean mitral valve gradient is 7mmHg. There is moderate mitral stenosis.  Tricuspid Valve Right ventricular systolic pressure could not be approximated due to inadequate tricuspid regurgitation. There is mild (1+) tricuspid regurgitation.  Aortic Valve No hemodynamically significant valvular aortic stenosis.  Pulmonic Valve The pulmonic valve is not well visualized.  Vessels Dilation of the inferior vena cava is present with abnormal respiratory variation in diameter.  Pericardium There is no pericardial effusion.  ______________________________________________________________________________ MMode/2D Measurements & Calculations LVOT diam: 2.0 cm LVOT area: 3.0 cm2  Doppler Measurements & Calculations MV E max almas: 115.0 cm/sec MV A max almas: 147.0 cm/sec MV E/A: 0.78  MV max P.1 mmHg MV mean P.4 mmHg MV V2 VTI: 41.4 cm MVA(VTI): 1.6 cm2 MV dec slope: 491.0 cm/sec2 MV dec time: 0.24 sec LV V1 max P.8 mmHg LV V1 max: 109.5 cm/sec LV V1 VTI: 22.0 cm MR PISA: 3.1 cm2 MR ERO: 0.22 cm2 MR volume: 40.1 ml SV(LVOT): 66.6 ml SI(LVOT): 46.6 ml/m2 E/E' av.6 Lateral E/e': 17.2 Medial E/e': 28.0  ______________________________________________________________________________ Report approved by: Vazquez Joiner 10/23/2023 10:27 AM       XR Chest Port 1 View    Result Date: 10/23/2023  CHEST ONE VIEW PORTABLE  10/23/2023 6:07 AM    INDICATION: Flash pulmonary edema. COMPARISON: 10/22/2023     IMPRESSION: Marked improvement in bilateral perihilar pulmonary infiltrates when compared to previous. Endotracheal tube remains in good position above the lo. New NG tube in the stomach. New right PICC line tip is in the low SVC, in good position. DEV MAN MD   SYSTEM ID:  [id number]    CT Head  w/o Contrast    Result Date: 10/23/2023  EXAM: CT HEAD W/O CONTRAST LOCATION: [facility] DATE: 10/23/2023 INDICATION: Acute hypoxic respiratory failure and respiratory arrest COMPARISON: 10/21/2023. TECHNIQUE: Routine CT Head without IV contrast. Multiplanar reformats. Dose reduction techniques were used. FINDINGS: INTRACRANIAL CONTENTS: No intracranial hemorrhage, extraaxial collection, or mass effect.  No CT evidence of acute infarct. There is scattered diffuse low-attenuation within the periventricular and subcortical white matter consistent with diffuse small vessel ischemic disease. The ventricular system, basal cisterns and the cortical sulci are consistent with diffuse volume loss. VISUALIZED ORBITS/SINUSES/MASTOIDS: No intraorbital abnormality. No paranasal sinus mucosal disease. Mastoid air cells and middle ear regions are clear. The patient is status post a partial left mastoidectomy. BONES/SOFT TISSUES: No acute abnormality.     IMPRESSION: 1.  No discrete mass lesion, hemorrhage or focal area suggestive of acute infarct. 2.  Stable diffuse age related changes.    XR Abdomen Port 1 View    Result Date: 10/23/2023  EXAM: XR ABDOMEN PORT 1 VIEW LOCATION: [facility] DATE: 10/23/2023 INDICATION: NG placement COMPARISON: None.     IMPRESSION: Enteric tube tip in stomach. No visible bowel dilatation. Mitral annular calcification and vascular calcification. Right hip arthroplasty. Degenerative change osseous structures. Nodules right lung base better characterized on CT.    XR Chest Port 1 View    Result Date: 10/23/2023  EXAM: CHEST SINGLE VIEW PORTABLE LOCATION: [facility] DATE/TIME: 10/22/2023 11:25 PM CDT INDICATION: Acute hypoxic respiratory failure requiring intubation. COMPARISON: 10/21/2023 at 1800 hours.     IMPRESSION: 1. Interval placement of an endotracheal tube with distal tip in the trachea approximately 4.5 cm to the  lo. 2. Moderately extensive patchy airspace opacities within the central aspects of the upper and mid lungs bilaterally, new since the recent comparison study. This most likely relates to pulmonary edema, but infection could have a similar appearance. 3. No other significant interval change. 4. Nonspecific 2 cm nodular opacity projecting over the lower right lung again noted.    CT Chest w Contrast    Result Date: 10/21/2023  EXAM: CT CHEST W CONTRAST LOCATION: [facility] DATE: 10/21/2023 INDICATION: bilateral pneumonitis COMPARISON: CT pulmonary angiogram 07/25/2023 TECHNIQUE: CT chest with IV contrast. Multiplanar reformats were obtained. Dose reduction techniques were used. CONTRAST: 50  ml Isovue 370 FINDINGS: LUNGS AND PLEURA: There are multiple heterogeneous attenuation solid lung nodules which are in the peripheral third and/or subpleural lungs bilaterally. A larger representative nodule in the right middle lobe along the anterior costal pleura is 19 x 30 mm (series 4, image 193). Other nodules are present in the lateral segment right middle lobe, right lower lobe superior segment along the mediastinal pleura, in the lateral basal right lower lobe and along the mediastinal pleura of the left upper lobe and left lower lobe superior segment. Nodules along the mediastinal pleura in the upper lobe have central cavitation. All of these nodules are stable in size. Bilateral mixed attenuation airspace opacities which predominate within the mid and upper lungs  in July 2023 have cleared. No new airspace opacities. Thin bands of atelectasis are present in the lower lobes along the posterior costal pleura. Trachea and central airways are patent. No pleural effusion. MEDIASTINUM: Moderate mitral annular calcifications and degenerative thickening of the anterior mitral valve leaflet. Cardiac chambers are normal in size. No pericardial effusion. No enlarged hilar or mediastinal lymph nodes.  Esophagus is decompressed. Imaged thyroid gland is normal. There is mixed attenuation atheroma throughout the aortic arch, proximal great vessels and in the descending thoracic aorta but no thoracic aortic aneurysm. CORONARY ARTERY CALCIFICATION: Severe. UPPER ABDOMEN: Mild distention of the imaged intrahepatic bile ducts, similar to prior. There are no new findings in the imaged upper abdomen. MUSCULOSKELETAL: Unchanged compression deformities of superior endplate of T3, and the T6 T9, and T12 vertebrae. There is a new fracture deformity of the manubrium. Focal heterogeneity and enlargement of the muscles anterior to the proximal right humerus.     IMPRESSION: 1.  Solid heterogeneous attenuation bilateral peripheral lung nodules are stable compared to 07/25/2023. In the setting of known diagnosis of rheumatoid arthritis these are consistent with necrobiotic rheumatoid nodules. 2.  Symmetric mid and upper lung mixed attenuation airspace opacities present in July 2023 have resolved. No findings to suggest acute pneumonitis. 3.  Severe atheromatous coronary calcifications and degenerative thickening of the anterior leaflet of the mitral valve. 4.  New mildly displaced fracture of the manubrium. Unchanged thoracic spine compression deformities.    Chest XR,  PA & LAT    Result Date: 10/21/2023  EXAM: XR CHEST 2 VIEWS LOCATION: [facility] DATE: 10/21/2023 INDICATION: fever, confusion COMPARISON: 10/13/2023     IMPRESSION: Heart size is normal. There are 2 new right lower lobe pulmonary nodules, measuring up to 2 cm. Left upper lobe nodular opacity abutting the aortic arch redemonstrated. There is increasing prominence of the right hilum. Lung volumes have diminished with increasing heterogeneous parenchymal opacities. Multifocal pneumonitis favored over asymmetric edema. Further evaluation with chest CT recommended. No associated effusions. Biapical pleural calcifications are redemonstrated.      CT Head w/o Contrast    Result Date: 10/21/2023  EXAM: CT HEAD W/O CONTRAST LOCATION: [facility] DATE: 10/21/2023 INDICATION: Confusion COMPARISON: 10/13/2023. TECHNIQUE: Routine CT Head without IV contrast. Multiplanar reformats. Dose reduction techniques were used. FINDINGS: INTRACRANIAL CONTENTS: No acute intracranial hemorrhage. No hydrocephalus or extra-axial hemorrhage. Mild to moderate global cortical volume loss with expected dilatation of the ventricular system. Chronic small vessel ischemic changes are stable from recent CT assessment. VISUALIZED ORBITS/SINUSES/MASTOIDS: No intraorbital abnormality. No paranasal sinus mucosal disease. No middle ear or mastoid effusion. BONES/SOFT TISSUES: No acute abnormality.     IMPRESSION: 1.  No acute intracranial abnormality. Chronic changes as above.

## 2023-10-23 NOTE — PROGRESS NOTES
Assessment and Plan:   Nazia Goldsmith is a 83 year old female who was admitted on 10/21/2023. She initially presented with confusion, work-up so far consistent with UTI.  CT incidentally showing a manubrium fracture.  The patient had an episode of chest discomfort this and cardiology consulted in this context. She was recently in the hospital in July and angiogram showed  of the LCX. EF was normal.      # Flash pulmonary edema from MR and HTN  # UTI with associated Encephalopathy  # Mildly displaced manubrium fracture  # Chest Pain, noncardiac and reproducible  # NSTEMI, Likely Type II  # CAD, Lcx , Mod RCA/LAD lesions on cath 7/23  # Anemia, Positive Stool occult blood  # Subdural Hematoma recent October 2023  # Hypertension  # Mixed Mod to severe MR/ moderate MS  #Peripheral arterial disease     Reviewed echo. There is 3+ MR. This is causing her edema. There is LCX wall motion, also subtle and noted previously. This is borderline. She has anemia, no angina, non specific troponin, recent SDH. Not CAD intervention recommended at this time.  Control BP aggressively goal less than 120 to help MR and pulmonary edema. Valve anatomy is not suitable from clipping. There is moderate MS. If medical Rx fails with diuresis and afterload reduction, only option is MVR which she is a poor candidate for given age.     Discussed with patient family and ICU team. No further card recs. Call w questions.        Interval History:     No significant overnight issues. Extubvated this am          Medications:      aspirin  81 mg Oral or Feeding Tube Daily    chlorhexidine  15 mL Mouth/Throat Q12H    gabapentin  100 mg Oral BID    hydroxychloroquine  200 mg Oral Daily    [Held by provider] leflunomide  20 mg Oral Daily    loperamide  4 mg Oral QAM AC    [Held by provider] losartan  50 mg Oral Daily    metoprolol succinate ER  50 mg Oral Daily    pantoprazole  40 mg Oral Daily    pantoprazole  40 mg Intravenous Daily with  breakfast    piperacillin-tazobactam  3.375 g Intravenous Q6H    psyllium  2 capsule Oral Daily    rosuvastatin  10 mg Oral QPM    sodium chloride (PF)  10-40 mL Intracatheter Q7 Days    [Held by provider] spironolactone  12.5 mg Oral Daily    tolterodine ER  2 mg Oral Daily    vancomycin  750 mg Intravenous Q24H            Physical Exam:   Patient Vitals for the past 24 hrs:   BP Temp Temp src Pulse Resp SpO2 Weight   10/23/23 1030 (!) 159/89 -- -- 101 12 97 % --   10/23/23 1000 (!) 146/63 -- -- 79 11 100 % --   10/23/23 0930 (!) 154/70 -- -- 86 11 100 % --   10/23/23 0900 123/57 -- -- 70 16 97 % --   10/23/23 0830 123/57 -- -- 72 14 98 % --   10/23/23 0800 100/47 97.5  F (36.4  C) -- 68 13 97 % --   10/23/23 0730 (!) 85/46 -- -- 75 18 100 % --   10/23/23 0700 94/49 -- -- 75 17 97 % --   10/23/23 0630 92/44 -- -- 77 18 99 % --   10/23/23 0600 94/54 -- -- 79 18 100 % --   10/23/23 0530 91/54 -- -- 80 18 98 % --   10/23/23 0500 97/54 -- -- 81 17 99 % --   10/23/23 0430 108/64 -- -- 89 14 99 % --   10/23/23 0400 98/59 96.8  F (36  C) Axillary 89 17 96 % --   10/23/23 0330 98/57 -- -- 92 18 96 % --   10/23/23 0300 116/60 -- -- 89 18 100 % --   10/23/23 0245 127/75 -- -- 90 17 99 % --   10/23/23 0230 97/58 -- -- 94 18 98 % --   10/23/23 0200 -- -- -- -- -- -- 46 kg (101 lb 6.6 oz)   10/23/23 0145 105/54 -- -- 93 16 98 % --   10/23/23 0130 (!) 165/93 -- -- 103 26 100 % --   10/23/23 0115 (!) 145/63 -- -- 99 (!) 36 99 % --   10/23/23 0100 123/63 -- -- 99 (!) 36 98 % --   10/23/23 0045 132/60 -- -- 102 (!) 35 97 % --   10/23/23 0030 118/59 -- -- 102 24 96 % --   10/23/23 0015 (!) 144/72 -- -- 109 22 95 % --   10/23/23 0000 137/64 -- -- 105 19 94 % --   10/22/23 2345 (!) 155/88 -- -- 110 (!) 31 95 % --   10/22/23 2340 (!) 150/87 (!) 96.4  F (35.8  C) Axillary 107 (!) 32 94 % --   10/22/23 2330 -- -- -- -- -- 96 % --   10/22/23 2044 (!) 147/77 98.5  F (36.9  C) Oral 99 18 92 % --   10/22/23 1640 (!) 147/71 97.9  F (36.6  C)  "Oral 91 18 96 % --   10/22/23 1104 109/52 98.1  F (36.7  C) Oral 78 16 96 % --     Vitals:    10/22/23 0539 10/23/23 0200   Weight: 47.2 kg (104 lb 0.9 oz) 46 kg (101 lb 6.6 oz)         Intake/Output Summary (Last 24 hours) at 10/23/2023 1058  Last data filed at 10/23/2023 1000  Gross per 24 hour   Intake 653.88 ml   Output 775 ml   Net -121.12 ml       10/18 0700 - 10/23 0659  In: 458.33 [P.O.:240; I.V.:218.33]  Out: 1050 [Urine:1050]  Net: -591.67    Exam:  General alert oriented x3 no acute distress  Respirations Clear  Cardiovascular Regular, no edema, warm, 3/6 PSM from MR and some EDM  Abdomen nondistended   Psych appropriate affect         Data:   LABS (Last four results)  CMP  Recent Labs   Lab 10/23/23  0618 10/23/23  0411 10/22/23  2346 10/22/23  2334 10/22/23  0650 10/21/23  1732     --  132*  --  133* 132*   POTASSIUM 3.5  --  4.1  --  3.8 3.8   CHLORIDE 100  --  96*  --  98 97*   CO2 21*  --  16*  --  21* 22   ANIONGAP 14  --  20*  --  14 13   * 151* 211* 223* 86 86   BUN 11.4  --  12.8  --  15.3 20.9   CR 0.75  --  0.79  --  0.86 0.94   GFRESTIMATED 78  --  73  --  66 60*   JAMES 7.7*  --  8.2*  --  8.1* 7.6*   PROTTOTAL 5.5*  --  6.4  --   --   --    ALBUMIN 2.8*  --  3.2*  --   --   --    BILITOTAL 0.3  --  0.3  --   --   --    ALKPHOS 78  --  102  --   --   --    AST 37  --  33  --   --   --    ALT 10  --  12  --   --   --      CBC  Recent Labs   Lab 10/23/23  0618 10/22/23  2346 10/22/23  2035 10/22/23  1411 10/22/23  0650 10/21/23  1732   WBC 13.4* 17.8*  --   --  11.9* 12.6*   RBC 2.65* 3.36*  --   --  2.65* 3.12*   HGB 8.4* 10.8* 9.0* 9.7* 8.4* 10.1*   HCT 26.9* 34.7*  --   --  26.6* 31.2*   * 103*  --   --  100 100   MCH 31.7 32.1  --   --  31.7 32.4   MCHC 31.2* 31.1*  --   --  31.6 32.4   RDW 13.5 13.8  --   --  13.5 13.6    230  --   --  202 224     INR  Recent Labs   Lab 10/22/23  2346   INR 1.08     TROPONINS No results found for: \"TROPI\", \"TROPONIN\", \"TROPR\", " "\"TROPN\"                                                                                                         KEMI Joiner, [facility]  Cardiology, [facility]    This note was completed in part using dictation via the Dragon voice recognition software. Although reviewed after completion, some word and grammatical errors may occur and must be interpreted in the appropriate clinical context.  If there are any questions pertaining to this issue, please contact me for further clarification or information.    "

## 2023-10-24 NOTE — PLAN OF CARE
Patient taken off of BIPAP around 1400 and breathing much improved. Voiding via purewick-had very soaked pad so unknown amount of output from doses of lasix. -110s. Ate some dinner that her sons brought her tonight. Bmx2 today. Sons at bedside throughout the day,

## 2023-10-24 NOTE — PROGRESS NOTES
Critical Care  Note      10/24/2023    Name: Nazia Goldsmith MRN#: [id number]   Age: 83 year old YOB: 1940     [facility] Day# 3  ICU DAY #    MV DAY #             Problem List:   Principal Problem:    Pneumonitis  Active Problems:    Metabolic encephalopathy    Pyelonephritis    Fracture of manubrium, initial encounter for closed fracture         Summary/Hospital Course:   Nazia Goldsmith is a 82 yo assisted living resident F with multiple comorbidities including hypertension, acute on chronic diastolic congestive heart failure, mitral valve regurgitation, mitral valve stenosis, coronary artery disease, GERD with esophagitis, history of duodenal ulcer, peripheral arterial disease, rheumatoid arthritis, T12 compression fracture, and breast cancer.    Patient had recent admission to [facility] 1 week ago due to acute confusion and 3 mm SDH. Follow-up CT scan showed resolution, she was back at her baseline mental status, then, she was discharged.    Again, she was found confused and not answering appropriately.  She was brought to the ER and found to be febrile and to have leukocytosis.  Chest x-ray showed pneumonia and UA with signs of UTI.  She also has an elevated Troponin, likely a type II non-ST elevation MI in the setting of sepsis. She was found unresponsive with increased WOB and flash pulmonary edema. She was intubated and transferred to the ICU.     Interval/overnight events: Admitted earlier yesterday morning for sepsis, ventilatory management and further cares. She was extubated yesterday, did well overnight. She was short of breath again this morning, which improved after IV Lasix 40 mg x 2. She was placed on BiPAP as well.        Assessment and plan :     Nazia Goldsmith IS a 83 year old female admitted on 10/21/2023 for sepsis due to UTI versus pneumonia, and acute respiratory failure requiring ventilatory support.  I have personally reviewed the daily labs, imaging studies, cultures  and discussed the case with referring physician and consulting physicians.     My assessment and plan by system for this patient is as follows:    Neurology/Psychiatry:   1. Recent history of subdural hematoma on 10/12/2023  - Will monitor any changes in her neuro status  - CT head follow-up showed resolution of SDH on 10/13/2023, but also no intracranial bleeding on 10/21/2023 and 10/23/2023    2. Pain/analgesia/sedation  - No issues at this time. She was extubated yesterday.      Cardiovascular:   1.  Non-STEMI/flash pulmonary edema  - Given diuretics, responded well after diuresis and afterload reduction  - Echo yesterday: Mitral regurgitation, causing edema. Not CAD intervention recommended at this time.   - Per Cardiology: Control BP aggressively goal SBP < 120 to help MR and pulmonary edema. Valve anatomy is not suitable from clipping. There is moderate MS. If medical Rx fails with diuresis and afterload reduction, only option is MVR which she is a poor candidate for given age.   - Carvedilol 12.5 mg BID w meals  - IV Lasix 40 mg today  - Losartan 25 mg daily    Pulmonary/Ventilator Management:   1. Oxygenation/ventilation/mechanics  - Extubated yesterday  - Diffuse bilateral infiltrates  - Follow-up chest x-ray today  - IV Lasix 40 mg today x 2    GI and Nutrition :   1.  History of esophagitis and esophageal strictures  - IV pantoprazole    Renal/Fluids/Electrolytes:   No current issues  -  Creatinine 0.79  -  Monitor electrolytes and replace as needed    Infectious Disease:   1.  Sepsis likely urinary source versus pneumonia  - UTI, will follow cultures  - Zosyn and Vanco started yesterday. Switched to Ceftriaxone yesterday.  - We will follow and narrow coverage as needed.    Endocrine:   1.  No current issues  2.  Blood glucose less than 180  3.  Insulin as needed    Hematology/Oncology:   1.  Chronic anemia, no signs, symptoms of active blood loss  -Keep hemoglobin more than 7, transfuse as needed      IV/Access:   1. Venous access - PICC line  2. Arterial access - not needed at this time    Plan  -PICC line: Central access required and necessary      ICU Prophylaxis:   1. DVT: LMWH/mechanical  2. Stress Ulcer: PPI/H2 blocker  4. Restraints: Not needed  5. Wound care  - n/a  6. Feedings: speech evaluation  7. Family Update:Sons updated  8. Disposition - ICU cares     Patient seen, examined and discussed with Dr. Giselle Hines MD, Critical Care Attending    Jacki Guy MD, FACS  Surgical Critical Care Fellow        Code Status: No CPR- Pre-arrest intubation OK               Key Medications:      aspirin  81 mg Oral or Feeding Tube Daily    carvedilol  12.5 mg Oral BID w/meals    cefTRIAXone  2 g Intravenous Q24H    enoxaparin ANTICOAGULANT  30 mg Subcutaneous Q24H    gabapentin  100 mg Oral BID    hydroxychloroquine  200 mg Oral Daily    [Held by provider] leflunomide  20 mg Oral Daily    loperamide  4 mg Oral QAM AC    [Held by provider] losartan  50 mg Oral Daily    magnesium sulfate  2 g Intravenous Once    pantoprazole  40 mg Oral Daily    psyllium  2 capsule Oral Daily    rosuvastatin  10 mg Oral QPM    sodium chloride (PF)  10-40 mL Intracatheter Q7 Days    [Held by provider] spironolactone  12.5 mg Oral Daily    tolterodine ER  2 mg Oral Daily                  Physical Examination:   Temp:  [97.1  F (36.2  C)-98.4  F (36.9  C)] 98.4  F (36.9  C)  Pulse:  [] 98  Resp:  [9-35] 16  BP: ()/(50-90) 135/71  FiO2 (%):  [30 %] 30 %  SpO2:  [89 %-100 %] 100 %      Intake/Output Summary (Last 24 hours) at 10/24/2023 1015  Last data filed at 10/23/2023 2200  Gross per 24 hour   Intake 10.83 ml   Output 325 ml   Net -314.17 ml         Wt Readings from Last 4 Encounters:   10/24/23 44 kg (97 lb)   10/12/23 45.2 kg (99 lb 10.4 oz)   08/01/23 41.8 kg (92 lb 2.4 oz)     Vent Mode: CPAP/PS  (Continuous positive airway pressure with Pressure Support)  FiO2 (%): 30 %  Resp Rate (Set): 18  breaths/min  Tidal Volume (Set, mL): 350 mL  PEEP (cm H2O): 5 cmH2O  Pressure Support (cm H2O): 5 cmH2O  Resp: 16    Recent Labs   Lab 10/23/23  0618 10/23/23  0234   PH  --  7.40   PCO2  --  34*   PO2  --  170*   HCO3  --  21   O2PER 30 60       GEN: Alert and oriented x 4, short of breath  HEENT: head ncat, sclera anicteric, OP patent, trachea midline   PULM: clear anteriorly    CV/COR: RRR S1S2 no gallop,  No rub, no murmur  ABD: soft nontender, hypoactive bowel sounds, no mass  EXT:  warm and well perfused x4  NEURO: PERRL, no obvious deficits  SKIN: no obvious rash  LINES: clean, dry intact         Data:   All data and imaging reviewed     ROUTINE ICU LABS (Last four results)  CMP  Recent Labs   Lab 10/24/23  0407 10/24/23  0401 10/23/23  1611 10/23/23  0618 10/23/23  0411 10/22/23  2346 10/22/23  2334 10/22/23  0650   NA  --  136  --  135  --  132*  --  133*   POTASSIUM  --  3.3*  --  3.5  --  4.1  --  3.8   CHLORIDE  --  101  --  100  --  96*  --  98   CO2  --  22  --  21*  --  16*  --  21*   ANIONGAP  --  13  --  14  --  20*  --  14   GLC 95 95 77 128*   < > 211*   < > 86   BUN  --  10.7  --  11.4  --  12.8  --  15.3   CR  --  0.80  --  0.75  --  0.79  --  0.86   GFRESTIMATED  --  72  --  78  --  73  --  66   JAMES  --  7.7*  --  7.7*  --  8.2*  --  8.1*   MAG  --  1.2*  --   --   --   --   --   --    PHOS  --  3.0  --   --   --   --   --   --    PROTTOTAL  --   --   --  5.5*  --  6.4  --   --    ALBUMIN  --   --   --  2.8*  --  3.2*  --   --    BILITOTAL  --   --   --  0.3  --  0.3  --   --    ALKPHOS  --   --   --  78  --  102  --   --    AST  --   --   --  37  --  33  --   --    ALT  --   --   --  10  --  12  --   --     < > = values in this interval not displayed.     CBC  Recent Labs   Lab 10/24/23  0401 10/23/23  0618 10/22/23  2346 10/22/23  2035 10/22/23  1411 10/22/23  0650   WBC 11.0 13.4* 17.8*  --   --  11.9*   RBC 2.33* 2.65* 3.36*  --   --  2.65*   HGB 7.6* 8.4* 10.8* 9.0*   < > 8.4*   HCT 23.4*  "26.9* 34.7*  --   --  26.6*    102* 103*  --   --  100   MCH 32.6 31.7 32.1  --   --  31.7   MCHC 32.5 31.2* 31.1*  --   --  31.6   RDW 13.8 13.5 13.8  --   --  13.5    195 230  --   --  202    < > = values in this interval not displayed.     INR  Recent Labs   Lab 10/22/23  2346   INR 1.08     Arterial Blood Gas  Recent Labs   Lab 10/23/23  0618 10/23/23  0234   PH  --  7.40   PCO2  --  34*   PO2  --  170*   HCO3  --  21   O2PER 30 60       All cultures:  No results for input(s): \"CULT\" in the last 168 hours.  Recent Results (from the past 24 hour(s))   XR Chest Port 1 View    Narrative    EXAM: CHEST SINGLE VIEW PORTABLE  LOCATION: [facility]  DATE/TIME: 10/22/2023 11:25 PM CDT    INDICATION: Acute hypoxic respiratory failure requiring intubation.  COMPARISON: 10/21/2023 at 1800 hours.      Impression    IMPRESSION:   1. Interval placement of an endotracheal tube with distal tip in the trachea approximately 4.5 cm to the lo.  2. Moderately extensive patchy airspace opacities within the central aspects of the upper and mid lungs bilaterally, new since the recent comparison study. This most likely relates to pulmonary edema, but infection could have a similar appearance.   3. No other significant interval change.  4. Nonspecific 2 cm nodular opacity projecting over the lower right lung again noted.   CT Head w/o Contrast    Narrative    EXAM: CT HEAD W/O CONTRAST  LOCATION: [facility]  DATE: 10/23/2023    INDICATION: Acute hypoxic respiratory failure and respiratory arrest  COMPARISON: 10/21/2023.  TECHNIQUE: Routine CT Head without IV contrast. Multiplanar reformats. Dose reduction techniques were used.    FINDINGS:  INTRACRANIAL CONTENTS: No intracranial hemorrhage, extraaxial collection, or mass effect.  No CT evidence of acute infarct. There is scattered diffuse low-attenuation within the periventricular and subcortical white matter consistent " with diffuse small   vessel ischemic disease. The ventricular system, basal cisterns and the cortical sulci are consistent with diffuse volume loss.     VISUALIZED ORBITS/SINUSES/MASTOIDS: No intraorbital abnormality. No paranasal sinus mucosal disease. Mastoid air cells and middle ear regions are clear. The patient is status post a partial left mastoidectomy.    BONES/SOFT TISSUES: No acute abnormality.      Impression    IMPRESSION:  1.  No discrete mass lesion, hemorrhage or focal area suggestive of acute infarct.  2.  Stable diffuse age related changes.   XR Abdomen Port 1 View    Narrative    EXAM: XR ABDOMEN PORT 1 VIEW  LOCATION: [facility]  DATE: 10/23/2023    INDICATION: NG placement  COMPARISON: None.      Impression    IMPRESSION: Enteric tube tip in stomach. No visible bowel dilatation. Mitral annular calcification and vascular calcification. Right hip arthroplasty. Degenerative change osseous structures. Nodules right lung base better characterized on CT.   XR Chest Port 1 View    Narrative    CHEST ONE VIEW PORTABLE  10/23/2023 6:07 AM       INDICATION: Flash pulmonary edema.    COMPARISON: 10/22/2023       Impression    IMPRESSION: Marked improvement in bilateral perihilar pulmonary  infiltrates when compared to previous. Endotracheal tube remains in  good position above the lo. New NG tube in the stomach. New right  PICC line tip is in the low SVC, in good position.         Billing: This patient is critically ill: Yes. Total critical care time today 50 min, excluding procedures.

## 2023-10-24 NOTE — PROGRESS NOTES
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    acute complicated cystitis/pyelonephritis pansensitive E. Coli >100K  *Initially received Vanco and Zosyn, de-escalated as below  *immunecompromised patient from her RA treatment  -Continue ceftriaxone day 2 will need stop date likely can stop on 10/25/2023 which would complete a 5-day course of appropriate antimicrobial    acute hypoxic respiratory failure secondary to   cardiogenic pulmonary edema from   mitral valvulopathy, regurgitant type, severe 3+ with gradient 7-8  -We will schedule diuretics 80 mg IV twice daily  - Continue as needed NIPPV use  - Goal SBP less than 120  - Daily weights and strict I's and O's  - Continue Coreg  -Appreciate cardiology recommendations    Anemia worsening without overt blood loss though she is on aspirin and LMWH  *Given history of duodenal ulcers this certainly may be a factor though no overt blood loss noted  - Hold enoxaparin  - Type and screen  - Recheck hemoglobin now then bid  - Check coags  - We will obtain blood consent  -Continue PPI    CAD  Non-STEMI type II likely supply demand mismatch  * of the left circumflex as of July 2023 coronary angiogram  - Continue PTA aspirin  -Hold ARB so as to have room for additional diuresis    Multiple electrolyte abnormalities including hypokalemia hypomagnesemia hypocalcemia  - All replaced under the care of the  "ICU team  -recheck BMP, Mg, in am    recent subdural hematoma from 10/12/2023  - Consult PT  - Out of bed 3 times daily    T12 compression fracture, age unknown  Incidental finding of manubrial fracture  - Supportive therapy with as needed analgesics    GERD  -PI    Rheumatoid arthritis  osteoporosis,  Osteoarthritis  -Continue PTA Plaquenil  -Hold PTA Arava    Chronic and/or historical problems  DU  esophageal stricture  stage I breast cancer  pulmonary nodule  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     Hyperlipidemia  - Continue PTA Crestor        Clinically Significant Risk Factors        # Hypokalemia: Lowest K = 3.3 mmol/L in last 2 days, will replace as needed     # Hypomagnesemia: Lowest Mg = 1.2 mg/dL in last 2 days, will replace as needed  # Anion Gap Metabolic Acidosis: Highest Anion Gap = 20 mmol/L in last 2 days, will monitor and treat as appropriate  # Hypoalbuminemia: Lowest albumin = 2.8 g/dL at 10/23/2023  6:18 AM, will monitor as appropriate      # Acute heart failure with preserved ejection fraction: heart failure noted on problem list, last echo with EF >50%, and receiving IV diuretics           # Financial/Environmental Concerns: none     -All issues have been addressed above         Diet: Combination Diet Regular Diet Adult; 2 gm NA Diet    DVT Prophylaxis: Pneumatic Compression Devices  Burch Catheter: Not present  Lines: PRESENT      PICC 10/23/23 Triple Lumen Right Brachial vein medial Ok for immediate use-Site Assessment: WDL      Cardiac Monitoring: ACTIVE order. Indication: Acute decompensated heart failure (48 hours)  Code Status: No CPR- Do NOT Intubate    Long discussion held with patient's son Yosi and patient.  Initially she said \"I am done\".  She identifies family as being very important she would later said I am \"done fighting\" she is been thinking about this quite a bit.  She and her son identify socialization at meals at her custodial is very important to her noting that it is hard to get " "outside to do many other activities and that many of her friends are dying.  When queried about whether or not she is suffering she says \"I do not know\".  She is open to DNR/DNI as well as continuing NIPPV as she is awaiting the arrival of her younger son.       Disposition Plan     Expected Discharge Date: 10/27/2023      Destination: inpatient rehabilitation facility       Entered: GIOVANNY Gotti CNP 10/24/2023, 5:06 PM     The patient's care was discussed with the Attending Physician, Dr. Quiles, Bedside Nurse, Patient, and Patient's Family.      GIOVANNY Gotti CNP  [facility]  Securely message with [employer] info)  Text page via [employer] Paging/Directory     ______________________________________________________________________    Chief Complaint   Assume primary care of patient on transfer out of ICU    History is obtained from the patient and EMR and ICU physician.      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    Patient initially was admitted by the hospitalist service started on antimicrobials for her UTI presenting with confusion..  She developed acute hypoxic respiratory failure requiring intubation and mechanical ventilation for approximately 24 hours time.  Postextubation she continued to intermittently require NIPPV.  Cardiology was consulted because of elevated troponins, echocardiogram was obtained which revealed severe mitral regurgitation for which she was aggressively diuresed.  She is not felt to be a candidate for mitral valve clipping or " surgical mitral valve replacement.  Postextubation hospital service was asked to assume primary care of patient on transfer out of ICU.    Per RN patient's had low urine output despite the Lasix.  She was using NIPPV all morning having some pink frothy sputum.  No obvious bleeding.  Patient endorses ongoing dyspnea but it is tolerable and better than it has been.  She is moving bowels denies chest pain no generalized pain.    Past Medical History    I have reviewed this patient's medical history and updated it with pertinent information if needed.   Past Medical History:   Diagnosis Date    Acute on chronic diastolic congestive heart failure (H) 2023    Breast cancer (H) 2010    right stage I    CAD (coronary artery disease)     diffuse disease of LAD and RCA    Diverticulosis of large intestine     Duodenal ulcer without hemorrhage or perforation and without obstruction     Esophageal stricture     Gastroesophageal reflux disease with esophagitis     Grade II diastolic dysfunction     HTN (hypertension)     Mitral valve regurgitation     moderate    Mitral valve stenosis     mild    Osteoarthritis     Osteoporosis     PAD (peripheral artery disease) (H24) 2022    LLE angioplasty    Pulmonary nodules 2020    RML    RA (rheumatoid arthritis) (H)     T12 compression fracture (H)     Tibial plateau fracture     right       Prior to Admission Medications   Prior to Admission Medications   Prescriptions Last Dose Informant Patient Reported? Taking?   acetaminophen (TYLENOL) 500 MG tablet 10/20/2023 at [facility] Home Yes Yes   Sig: Take 1,000 mg by mouth 3 times daily   ferrous gluconate (FERGON) 324 (38 Fe) MG tablet 10/20/2023 at [facility] Home Yes Yes   Sig: Take 324 mg by mouth daily   furosemide (LASIX) 40 MG tablet 10/21/2023 at am Nursing Home No Yes   Sig: Take 1 tablet (40 mg) by mouth daily   gabapentin (NEURONTIN) 100 MG capsule 10/21/2023 at am Nursing Home Yes Yes   Sig: Take 100 mg by mouth 2 times  daily   hydroxychloroquine (PLAQUENIL) 200 MG tablet 10/21/2023 at am Nursing Home Yes Yes   Sig: Take 200 mg by mouth daily   leflunomide (ARAVA) 20 MG tablet 10/21/2023 at am Nursing Home Yes Yes   Sig: Take 20 mg by mouth daily   lidocaine HCl 3 % cream 10/21/2023 Nursing Home Yes Yes   Sig: Apply to right shoulder area of pain twice daily as needed   loperamide (IMODIUM) 2 MG capsule 10/21/2023 at am Nursing Home Yes Yes   Sig: Take 4 mg by mouth daily before breakfast   losartan (COZAAR) 50 MG tablet 10/21/2023 at am Nursing Home No Yes   Sig: Take 1 tablet (50 mg) by mouth daily   metoprolol succinate ER (TOPROL XL) 50 MG 24 hr tablet 10/21/2023 at am Nursing Home Yes Yes   Sig: Take 75 mg by mouth daily   multivitamin, therapeutic (THERA-VIT) TABS tablet 10/21/2023 at 1400 [facility] Home Yes Yes   Sig: Take 1 tablet by mouth daily   ondansetron (ZOFRAN ODT) 4 MG ODT tab prn Nursing Home Yes No   Sig: Take 4 mg by mouth every 8 hours as needed for nausea   pantoprazole (PROTONIX) 40 MG EC tablet 10/21/2023 at am Nursing Home Yes Yes   Sig: Take 40 mg by mouth daily   psyllium (METAMUCIL/KONSYL) capsule 10/21/2023 at am Nursing Home No Yes   Sig: Take 2 capsules by mouth daily   rosuvastatin (CRESTOR) 10 MG tablet 10/20/2023 at pm Nursing Home No Yes   Sig: Take 1 tablet (10 mg) by mouth every evening   solifenacin (VESICARE) 5 MG tablet 10/21/2023 at am Nursing Home Yes Yes   Sig: Take 5 mg by mouth daily   spironolactone (ALDACTONE) 25 MG tablet 10/21/2023 at am Nursing Home Yes Yes   Sig: Take 12.5 mg by mouth daily   vitamin D3 (CHOLECALCIFEROL) 50 mcg (2000 units) tablet 10/12/2023 at 1400 Nursing Home Yes Yes   Sig: Take 1 tablet by mouth daily   zoledronic Acid (RECLAST) 5 MG/100ML SOLN infusion 10/14/2023 Nursing Home Yes No   Sig: Inject 5 mg into the vein once Every year      Facility-Administered Medications: None     Allergies   Allergies   Allergen Reactions    Hydrocodone     Methotrexate Unknown     Sulindac Unknown    Triamterene Nausea    Codeine Dizziness    Dyazide [Hydrochlorothiazide W-Triamterene] Nausea and Vomiting    Hydrocodone-Acetaminophen Nausea and Vomiting    Omeprazole Rash    Oxycodone Other (See Comments) and Dizziness     constipation    Ranitidine Rash       Physical Exam   Vital Signs: Temp: 98.4  F (36.9  C) Temp src: Oral BP: 118/63 Pulse: 106   Resp: 19 SpO2: 99 % O2 Device: BiPAP/CPAP Oxygen Delivery: 2 LPM  Weight: 97 lbs .04 oz    Constitutional: vs as per EMR  General:  adult pt lying in bed without acute distress  Neuro: +follows commands wiggle toes and show 2 fingers bilat, face symmetric, tongue midline, speech fluent soft-spoken  Eyes pupils equal round 3mm briskly reactive bilat, sclera nonicteric, noninjected, conjunctiva pink,  Head, ENT & mouth: NC/AT,  mouth moist oral mucosa  Neck: supple  CV S1S2, low-grade tachycardia, murmur of mitral regurgitation  resp: Rales bilateral upper lobes, not speaking in full sentences  gi:normoactive bowel sounds, soft, nontender, nondisteded  Ext: no edema or mottling, large lesion on the right wrist likely from her RA also swan-neck deformities of her fingers bilaterally  Skin: no rashes on exposed skin  Lymph: defer  Musculoskeletal no bony joint deformities      Medical Decision Making       Please see A&P for additional details of medical decision making.  60 MINUTES SPENT BY ME on the date of service doing chart review, history, exam, documentation & further activities per the note.         Data   Data reviewed today: I reviewed all medications, new labs and imaging results over the last 24 hours. I personally reviewed no images or EKG's today.      I have personally reviewed the following data over the past 24 hrs:    11.0  \   8.9 (L)   / 162     136 101 10.7 /  96   4.3 22 0.80 \     INR:  1.07 PTT:  N/A   D-dimer:  N/A Fibrinogen:  N/A       Imaging results reviewed over the past 24 hrs:   Recent Results (from the past 24  hour(s))   XR Chest Port 1 View    Narrative    CHEST ONE VIEW  10/24/2023 10:35 AM     HISTORY: Followup bilateral pulmonary infiltrates.    COMPARISON: October 23, 2023      Impression    IMPRESSION: Moderately extensive groundglass infiltrates in a  perihilar distribution left worse than right have increased since  previous.    DARNELL MCGEE MD         SYSTEM ID:  [id number]        General Sunscreen Counseling: I recommended a broad spectrum sunscreen with a SPF of 30 or higher.  I explained that SPF 30 sunscreens block approximately 97 percent of the sun's harmful rays.  Sunscreens should be applied at least 15 minutes prior to expected sun exposure and then every 2 hours after that as long as sun exposure continues. If swimming or exercising sunscreen should be reapplied every 45 minutes to an hour after getting wet or sweating.  One ounce, or the equivalent of a shot glass full of sunscreen, is adequate to protect the skin not covered by a bathing suit. I also recommended a lip balm with a sunscreen as well. Sun protective clothing can be used in lieu of sunscreen but must be worn the entire time you are exposed to the sun's rays. Detail Level: Detailed

## 2023-10-24 NOTE — CONSULTS
Care Management Initial Consult    General Information  Assessment completed with: Children, Yosi  Type of CM/SW Visit: Initial Assessment    Primary Care Provider verified and updated as needed: Yes   Readmission within the last 30 days: no previous admission in last 30 days      Reason for Consult: discharge planning  Advance Care Planning: Advance Care Planning Reviewed: no concerns identified          Communication Assessment  Patient's communication style: spoken language (English or Bilingual)    Hearing Difficulty or Deaf: no   Wear Glasses or Blind: yes    Cognitive  Cognitive/Neuro/Behavioral: .WDL except  Level of Consciousness: lethargic  Arousal Level: arouses to touch/gentle shaking  Orientation: oriented x 4  Mood/Behavior: behavior appropriate to situation  Best Language: 0 - No aphasia  Speech: hoarse    Living Environment:   People in home: facility resident     Current living Arrangements: assisted living  Name of Facility: [facility]   Able to return to prior arrangements: yes       Family/Social Support:  Care provided by: self, other (see comments) (facility staff)  Provides care for: no one  Marital Status:   Children          Description of Support System: Supportive, Involved    Support Assessment: Adequate family and caregiver support    Current Resources:   Patient receiving home care services:       Community Resources:    Equipment currently used at home: walker, rolling, shower chair, grab bar, tub/shower, grab bar, toilet, raised toilet seat, other (see comments)  Supplies currently used at home:      Employment/Financial:  Employment Status: retired        Financial Concerns: none   Referral to Financial Worker: No       Does the patient's insurance plan have a 3 day qualifying hospital stay waiver?  No    Lifestyle & Psychosocial Needs:  Social Determinants of Health     Food Insecurity: Not on file   Depression: Not on file   Housing Stability: Not on file   Tobacco Use: Not on  file   Financial Resource Strain: Not on file   Alcohol Use: Not on file   Transportation Needs: Not on file   Physical Activity: Not on file   Interpersonal Safety: Not on file   Stress: Not on file   Social Connections: Not on file       Functional Status:  Prior to admission patient needed assistance:   Dependent ADLs:: Ambulation-walker, Bathing, Dressing, Grooming  Dependent IADLs:: Cooking, Cleaning, Laundry, Shopping, Meal Preparation, Medication Management, Transportation  Assesssment of Functional Status: Not at baseline with mobility    Mental Health Status:          Chemical Dependency Status:                Values/Beliefs:  Spiritual, Cultural Beliefs, [employer] Practices, Values that affect care: no               Additional Information:  Writer spoke with Pt's son-Yosi and introduced self and role in discharge planning. Pt lives at [facility] and uses a 4WW with a seat at baseline. Pt also gets services for bathing, dressing, cleaning, med management and all meals. Pt's son's will drive Pt to needed appointments. Writer spoke to Yosi about discharge and they are hoping for the Pt to go to a TCU at discharge. Pt has been to [facility] ([facility] 4 times and would like to go back. Writer will wait for PT/OT recs and revisit once discharge is closer.         Yani Hunt, RN, BSN, Care Coordinator

## 2023-10-24 NOTE — PLAN OF CARE
Goal Outcome Evaluation:         Neuro: PERRL, follows commands, alertx4    CV: teleNSR    Resp: LS diminished, 2L NC    : Low Urine output    GI:  BSNormoactive      Plan:   - Transfer out of ICU

## 2023-10-24 NOTE — SIGNIFICANT EVENT
Patient had another episode of flash pulmonary edema requiring 15L oxymask, tachycardic in 150's, labored breathing with pink frothy sputum. 40 lasix was administered and BIPAP initiated. Put out 400cc urine and improved somewhat clinically but still required BIPAP for labored breathing so potassium was replaced and an additional 40 of lasix given.

## 2023-10-25 NOTE — PLAN OF CARE
Paged hospitalist Radha Smith requesting clarification of blood pressure parameters. Order say goal is SBP <180 however ICU fellow's note from yesterday states SBP <120. Will await clarification.     1032: Dr. Smith at bedside and confirmed SBP is <120. Writer requested prn for BP and she stated to go through cardiology; will page cardiology.

## 2023-10-25 NOTE — PLAN OF CARE
Per previous instructions at bedside by hospitalist, if PRN anti-hypertensives are needed, to go through cardiology.     Paged cardiology requesting med, they stated they are no longer following patient.     Paged hospitalist requesting med. PRN hydralazine ordered.

## 2023-10-25 NOTE — PROGRESS NOTES
[facility]    Medicine Progress Note - Hospitalist Service    Date of Admission:  10/21/2023    Assessment & Plan     Nazia Goldsmith is an 83-year-old female with recent subdural hematoma, CAD, severe mitral regurgitation, severe pulmonary hypertension, who presented on 10/21/2023 with with acute confusion.  She was found to have acute cystitis due to E. coli.  She was incidentally also found to have manubrial fracture.  She was started on IV Zosyn and IV vancomycin.  She developed acute hypoxic respiratory failure due to flash pulmonary edema on 10/22.  She was intubated and transferred to ICU.  She was diuresed with IV Lasix.  She was subsequently extubated and transferred to hospitalist service 10/24/2023    Acute cystitis without hematuria due to E. Coli  Sepsis due to above  -Presented with fever of 100.9, heart rate 75, WBC 17.8, abnormal UA with pyuria.  Met criteria for sepsis  -Urine culture grew E. coli, pansensitive.  On IV ceftriaxone, today is day 4.  We will switch to p.o. Bactrim    Acute metabolic encephalopathy, due to acute cystitis with sepsis  -Was confused at assisted living facility and thus brought to ER for evaluation.  Encephalopathy of was secondary to acute cystitis with sepsis and has now resolved.  Mental status at baseline      Acute hypoxic respiratory failure requiring intubation and mechanical ventilation-secondary to flash pulmonary edema  -Was intubated on 10/22 and extubated on 10/24.  -Now doing well on room air  -Confirmed with patient and son, no further intubations    Recurrent episodes of flash pulmonary edema due to severe mitral regurgitation and hypertension  Acute diastolic CHF exacerbation  Severe pulmonary hypertension  Moderate to severe mitral regurgitation  Moderate mitral stenosis  - Limited echo 10/23 showed normal LV size and function, borderline mild inferior inferior lateral hypokinesis, normal RV size and function, moderate mitral  stenosis, 3+ mitral regurgitation, severe left atrial enlargement, mildly dilated IVC with increased filling pressures  -Received some IV fluids on admission due to sepsis and subsequently developed acute respiratory failure secondary to flash pulmonary edema on 10/22.  She was intubated  -Has been diuresed with IV Lasix.  Cardiology following  -Had similar episode earlier this year requiring intubation  -Cardiology has recommended aggressive control of blood pressure with goal less than 120 systolic to help mitral regurgitation and pulmonary edema. Valve anatomy is not suitable for clipping.  She is a poor candidate for mitral valve replacement  -Continue Lasix 80 mg every 12 hours  -Monitor I's/O, daily weight  -Palliative care consult to define goals of care    Coronary artery disease - moderate to severe diffuse LAD and RCA disease on angiogram 7/2023   Type II MI with NSTEMI  Noncardiac chest pain  -Chest pain felt to be noncardiac.  It is reproducible with palpation.  Mildly elevated troponin 113 -->84 -->107 felt to be type II MI due to sepsis, flash pulmonary edema  -Limited echo 10/23 showed wall motion abnormality in left circumflex territory.  This has been previously noted.  Cardiology did not recommend any intervention at this time due to recent subdural hematoma, no symptoms of angina, anemia.  - Continue aspirin    Recent subdural hematoma, 10/12/2023  -Head CT on 11/12/2023 showed 3 mm landing on frontal lobe after leaving the room subdural hematoma without intracranial mass effect/brain compression  -Repeat head CT on 10/21 showed resolution of subdural hematoma    Hypokalemia  -Secondary to diuresis.  Potassium replaced    Chronic microcytic anemia  - Hemoglobin stable at 8-9.  No hematochezia or melena.  - Has history of duodenal ulcers in the past.  - Monitor hemoglobin.  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  Blood pressure controlled with this  -Losartan on hold    T12 compression fracture, age unknown  Manubrial fracture  -Manubrial fracture incidentally noted on CT chest on admission     Hyperlipidemia  - Continue PTA Crestor              Diet: Combination Diet Regular Diet Adult; 2 gm NA Diet    DVT Prophylaxis: Pneumatic Compression Devices  Burch Catheter: Not present  Lines: PRESENT      PICC 10/23/23 Triple Lumen Right Brachial vein medial Ok for immediate use-Site Assessment: WDL      Cardiac Monitoring: ACTIVE order. Indication: Acute decompensated heart failure (48 hours)  Code Status: No CPR- Do NOT Intubate      Clinically Significant Risk Factors        # Hypokalemia: Lowest K = 3.3 mmol/L in last 2 days, will replace as needed     # Hypomagnesemia: Lowest Mg = 1.2 mg/dL in last 2 days, will replace as needed   # Hypoalbuminemia: Lowest albumin = 2.8 g/dL at 10/23/2023  6:18 AM, will monitor as appropriate      # Acute heart failure with preserved ejection fraction: heart failure noted on problem list, last echo with EF >50%, and receiving IV diuretics           # Financial/Environmental Concerns: none         Disposition Plan     Expected Discharge Date: 10/27/2023      Destination: inpatient rehabilitation facility              Radha Smith MD  Hospitalist Service  [facility]  Securely message with [employer] (more info)  Text page via [employer] Paging/Directory   ______________________________________________________________________    Interval History   Patient seen in ICU.  Sitting in bed.  Son present in room.  Patient reports she feels  better than before.  Vital signs are stable.  Not currently on oxygen.  Patient and son are interested in palliative care consult to define goals of care.  They are both clear on no further intubations.  Patient denies shortness of breath at this time.  Reports she gets shortness of breath on and off    Physical Exam   Vital Signs: Temp: 98.7  F (37.1  C) Temp src: Axillary BP: 109/55 Pulse: 100   Resp: 12 SpO2: 95 % O2 Device: None (Room air) Oxygen Delivery: 2 LPM  Weight: 97 lbs .04 oz    Constitutional - awake and alert, resting in bed, in no acute distress  Cardiovascular - regular rate and rhythm, no murmurs, trace edema  Pulmonary - lungs are clear to auscultation bilaterally, no wheezing or rhonchi  GI - abdomen is soft, nontender, nondistended, no hepatosplenomegaly or masses  Integumentary - skin is warm and dry, no rashes or ulcers  Neurological - awake, alert and oriented x3.  Moving all 4 extremities, normal speech, no focal deficits      Medical Decision Making       60 MINUTES SPENT BY ME on the date of service doing chart review, history, exam, documentation & further activities per the note.      Data     I have personally reviewed the following data over the past 24 hrs:    9.1  \   8.2 (L)   / 199     136 97 (L) 13.5 /  126 (H)   3.7 27 0.81 \     INR:  1.07 PTT:  N/A   D-dimer:  N/A Fibrinogen:  N/A       Imaging results reviewed over the past 24 hrs:   No results found for this or any previous visit (from the past 24 hour(s)).

## 2023-10-25 NOTE — PLAN OF CARE
Goal Outcome Evaluation:       Neuro: PERRL, follows commands, alertx4     CV: teleNSR/tach     Resp: LS clear, 2L NC     : AUO     GI:  BSNormoactive        Plan:   - Transfer out of ICU

## 2023-10-25 NOTE — PLAN OF CARE
Shift Summary  Assumed cares from [id number]      Neuro: A/Ox4, makes needs known via call light. Very pleasant, quiet patient.   Cardiac: NSR, Meeting SBP goal of <120.  Pulmonary: Slight crackles in upper lobes, dim in bases. Titrated to room air.   GI: Pt had multiple incontinent bowel movements this shift. On regular diet with decreased appetite.   : Purewick in place, adequate UOP.   Integumentary: Scattered scabs/bruising. Redness to perineum/coccyx.   Restraints: Not needed  Activity: Chairfast    Plan of care has been explained to patient: Yes    Lidia Mcleod RN

## 2023-10-26 NOTE — PLAN OF CARE
Patient is alert and oriented x3. Pleasant and cooperative. Placed on 1-1.5L NC overnight to keep sats >92%. Fine crackles in the upper lobes. Dyspnea upon exertion. UOP adequate. Awaiting transfer to CCU.

## 2023-10-26 NOTE — CONSULTS
Palliative Care Consultation Note  [facility]      Patient: Nazia Goldsmith  Date of Admission:  10/21/2023    Requesting Clinician / Team: Dr Smith/Hospitalist  Reason for consult: Goals of care     Recommendations & Counseling     GOALS OF CARE:   Comfort focused  and Hospice Care  Met with Nazia and her four sons. Decision was made to start comfort focused care today as she no longer desired restorative focus but wants to have comfort as her priority.  If Nazia remains medically stable she would be agreeable to discharge to a facility. She is currently living at the [facility]. Family is not sure if they would be able to support her needs at the [facility]. Would like assistance from  to identify facilities if she moves towards discharge with hospice.    ADVANCE CARE PLANNING:  No health care directive on file. Per  informed consent policy, next of kin should be involved if patient becomes unable.  There is a POLST form on file, but will need to be updated if she discharges.  Code status: No CPR- Do NOT Intubate    MEDICAL MANAGEMENT:   Comfort Care   Below are common symptoms routinely seen in patients with comfort focused goals and at the end of life. This patient may not currently exhibit all of these symptoms; however, if these were to occur our recommendations are as follows:     #Pain-chronic arthritis pain- rates as mild pain that she usually treats with Tylenol. Has not used opioids for pain.  - 1st choice:Morphine PO/SL 5-10 mg q 1 hour as needed.   - 2nd choice:Morphine IV 1-2 mg q 15 minutes as needed   - Adjunct: Tylenol NV    #Air hunger/Dyspnea - has had episodes of shortness of breath but currently is comfortable.   - 1st choice:Morphine PO/SL 5-10 mg q 1 hour as needed.   - 2nd choice:Morphine IV 1-2 mg q 15 minutes as needed   -Lasix 80 mg PO BID- can increase if needed to TID.  -Oxygen for comfort.      #Anxiety    - 1st choice: Lorazepam PO/SL q 0.5 mg  q 3 hour as  "needed.   - 2nd choice: Lorazepam IV q 0.5 mg  q 3 hour as needed    #Secretion burden   -Robinul 0.1 mg  q 4 hours as needed. If ineffective, increase up to 0.3 mg   -Consider atropine if Robinul ineffective after dose increase      #Nausea   -Zofran 4 mg q 6 hours as needed. Can increase to 8 mg   -Zyprexa 5 mg q 6 hours as needed     #Agitation  -Aggressive symptoms control first, then  -1st choice: Zyprexa 5 mg ODT or IM   -2nd choice: Increase dose of Zyprexa to 10 mg or consider switch to Haldol   -3rd choice: Lorazepam as above     PSYCHOSOCIAL/SPIRITUAL SUPPORT:  Family Sons Yosi, Richie, Nikhil and To  [place]ila community: [place]   Spiritual     Palliative Care will continue to follow. Thank you for the consult and allowing us to aid in the care of Nazia Goldsmith.    Reviewed patient case with hospitalist Dr Smith, patient's RN,     Jacki Briggs, CNS  Securely message with [employer] (more info)  Text page via [email or web address] Paging/Directory      During regular M-F work hours ([id number]) -- please contact me on [employer]   In my absence, securely message our team via [employer] \"[facility] [place]\" or go to On Call tab in [employer], search for \"[facility] [place]\"   After regular work hours and on weekends/holidays, to reach our on call physician, securely message via [employer] \"[facility] [place]\" or go to On Call tab in [employer], search for \"[facility] [place]\"     Palliative Summary/HPI     Nazia Goldsmith is a 83 year old female with a past medical history of SDH, HFpEF, CAD, severe mitral regurgitation, who presented on 10/21/23 with acute confusion. Upon assessment she was found to have a UTI from E Coli and was started on IV Zosyn and Vancomycin. She was also found to have a manubrial fracture. She was admitted to hospital and developed respiratory failure due to flash pulmonary edema on 10/22. She was ultimately intubated and transferred to ICU with IV lasix for diuresis.     Today, the patient was " seen for:  Goals of care    Palliative Care Summary:   Met with Nazia and her sons Yosi, Richie, Nikhil and To.   I introduced our role as an extra layer of support and how we help patients and families dealing with serious, potentially life-limiting illnesses. I explained the composition of the palliative care team.  Palliative care helps patients and families navigate their care while focusing on the whole person; providing emotional, social and spiritual support  Palliative care often assists with symptom management, information sharing about what to expect from the illness, available treatment options and what effect those options may have on the disease course, and provide effective communication and caring support. and Introduced the role of palliative care as an interdisciplinary team that cares for patients with serious illness to help support symptom management, communication, coping for patients and their families as well as support with medical decision making.    Prognosis, Goals, & Planning:    Functional Status just prior to this current hospitalization:  has been hospitalized 3 times in the past 6 months for recurrent CHF, SDH, falls. There has been a decline in daily function including increased weakness, fx decline.   Palliative Performance Scale (PPS): 60%  Significant disease.  Normal or reduced intake. Normal LOC or confusion. Reduced ambulation, some assist w/self-care, unable to work/do housework.  Estimated Median Survival in Days: 29 to 108 days.   ECOG3 (Capable of only limited self-care; needs help with ADLs; in bed/chair >50% of waking hours)  Prognosis, Goals, and/or Advance Care Planning:  We discussed general treatment options (full/restorative, selective/conservatives, and comfort only/hospice). We then discussed how these specifically apply to Nazia's condition.  Nazia states that she is exhausted with being in the hospital and does not wish to come back again as there are no good  interventions to bring her back to a good quality of life. She does not want to cycle in and out of hospital.  Decision was made to start comfort focused care today as she no longer desired restorative focus but wants to have comfort as her priority  Discussed what continuing restorative/life-prolonging care entails, including continued (re)admissions to the hospital, continuing with preventative and primary care, seeing disease/organ specific specialty consultations for medical treatments in hopes to prolong life for as long as possible.    Reviewed option of comfort focused care: It was explained to patient and family that comfort care is a good option when the burdens of aggressive treatments outweigh the benefits and are unwanted by patient, or not in their best interest. Comfort care focuses on optimizing quality of life and relief from distressing symptoms such as pain, shortness of breath, nausea, and anxiety and allowing a natural dying process. When comfort care is initiated, restorative focused care and all artificial means to sustain life are discontinued, including further diagnostic testing, lab draws, blood glucose testing/insulin, tube feedings, IV fluids, antibiotics, medications not for comfort, and vital signs.  Comfort care can be started in hospital and if patient remains stable may discharge to an outpatient setting for hospice care.  Reviewed hospice program. It was explained that hospice is not a place, but a type of care that a patient receives, if eligible (qualifying diagnosis and life expectancy of 6 months or less).  The three different hospice location scenarios (private residence, skillednursing facility, or hospice home) were described. It was explained that the patient/family decides where to receive hospice care based on how much assistance the patient will need once discharged from hospital.I explained that hospice does not cover room and board costs.      Code Status was addressed  today:   Yes, she made recent change to DNR/DNI recently.    Patient's decision making preferences: shared with support from loved ones        Patient has decision-making capacity today for complex decisions:Intact - has benefited from family support during goals of care today.            Coping, Meaning, & Spirituality:   Mood, coping, and/or meaning in the context of serious illness were addressed today: Yes Sons would like patient to have a Gnosticism hymn book in the room if possible.     Social:   Living situation:resides in assisted living [facility]  Important relationships/caregivers:4 sons who live in the area.    Medications:  I have reviewed this patient's medication profile and medications from this hospitalization.   Noted scheduled meds are:   aspirin  81 mg Oral or Feeding Tube Daily    carvedilol  12.5 mg Oral BID w/meals    [Held by provider] enoxaparin ANTICOAGULANT  30 mg Subcutaneous Q24H    furosemide  80 mg Intravenous Q12H    gabapentin  100 mg Oral BID    hydroxychloroquine  200 mg Oral Daily    [Held by provider] leflunomide  20 mg Oral Daily    loperamide  4 mg Oral QAM AC    [Held by provider] losartan  25 mg Oral Daily    pantoprazole  40 mg Oral Daily    psyllium  2 capsule Oral Daily    rosuvastatin  10 mg Oral QPM    sodium chloride (PF)  10-40 mL Intracatheter Q7 Days    [Held by provider] spironolactone  12.5 mg Oral Daily    sulfamethoxazole-trimethoprim  1 tablet Oral BID    tolterodine ER  2 mg Oral Daily       Noted PRN meds are:  acetaminophen **OR** acetaminophen, artificial tears, glucose **OR** dextrose **OR** glucagon, hydrALAZINE, melatonin, naloxone **OR** naloxone **OR** naloxone **OR** naloxone, - MEDICATION INSTRUCTIONS -, ondansetron **OR** ondansetron, - MEDICATION INSTRUCTIONS -, prochlorperazine **OR** prochlorperazine **OR** prochlorperazine, sodium chloride (PF), sodium chloride (PF), sodium chloride (PF)    ROS:  Comprehensive ROS is  reviewed and is negative  except as here & per HPI:     PHYSICAL EXAM:  Vital Signs: Temp: 98.1  F (36.7  C) Temp src: Oral BP: 124/63 Pulse: 84   Resp: 13 SpO2: 95 % O2 Device: Nasal cannula Oxygen Delivery: 1.5 LPM  Weight: 90 lbs 9.74 oz  Wt Readings from Last 4 Encounters:   10/26/23 41.1 kg (90 lb 9.7 oz)   10/12/23 45.2 kg (99 lb 10.4 oz)   08/01/23 41.8 kg (92 lb 2.4 oz)     GENERAL:  Alert, fatigued, frail, elderly woman who is in no  distress.  HEAD: Normocephalic atraumatic  SCLERA: Anicteric  EXTREMITIES: Warm; no edema  ABDOMEN:  No distention  RESPIRATORY: Breathing non labored  CARDIOVASCULAR: RRR  NEUROLOGIC: Alert.  PSYCH: Calm, cooperative.    Data reviewed:  Lab Results   Component Value Date    WBC 9.1 10/25/2023    WBC 11.0 10/24/2023    WBC 13.4 (H) 10/23/2023    HGB 8.3 (L) 10/26/2023    HGB 8.4 (L) 10/25/2023    HGB 8.7 (L) 10/25/2023    HCT 25.2 (L) 10/25/2023    HCT 23.4 (L) 10/24/2023    HCT 26.9 (L) 10/23/2023     10/26/2023     10/25/2023     10/24/2023     10/26/2023     10/25/2023     10/24/2023    POTASSIUM 3.3 (L) 10/26/2023    POTASSIUM 3.7 10/25/2023    POTASSIUM 4.3 10/24/2023    CHLORIDE 92 (L) 10/26/2023    CHLORIDE 97 (L) 10/25/2023    CHLORIDE 101 10/24/2023    CO2 30 (H) 10/26/2023    CO2 27 10/25/2023    CO2 22 10/24/2023    BUN 18.9 10/26/2023    BUN 13.5 10/25/2023    BUN 10.7 10/24/2023    CR 0.87 10/26/2023    CR 0.81 10/25/2023    CR 0.80 10/24/2023    GLC 96 10/26/2023     (H) 10/25/2023     (H) 10/25/2023    SED 48 (H) 05/11/2022    SED 80 (H) 11/26/2021    DD 3.15 (H) 07/25/2023    NTBNPI 1,332 08/01/2023    NTBNPI 1,398 07/31/2023    NTBNPI 1,690 07/30/2023    NTBNP 4,763 (H) 10/22/2023    NTBNP 1,656 (H) 08/31/2022    AST 37 10/23/2023    AST 33 10/22/2023    AST 23 10/12/2023    ALT 10 10/23/2023    ALT 12 10/22/2023    ALT 13 10/12/2023    ALKPHOS 78 10/23/2023    ALKPHOS 102 10/22/2023    ALKPHOS 98 10/12/2023    BILITOTAL 0.3  10/23/2023    BILITOTAL 0.3 10/22/2023    BILITOTAL 0.3 10/12/2023    INR 1.07 10/24/2023    INR 1.08 10/22/2023      Lab Results   Component Value Date    ALBUMIN 2.8 10/23/2023    ALBUMIN 3.0 11/26/2021     Results for orders placed or performed during the hospital encounter of 10/21/23   CT Head w/o Contrast    Impression    IMPRESSION:  1.  No acute intracranial abnormality. Chronic changes as above.   Chest XR,  PA & LAT    Impression    IMPRESSION: Heart size is normal. There are 2 new right lower lobe pulmonary nodules, measuring up to 2 cm. Left upper lobe nodular opacity abutting the aortic arch redemonstrated. There is increasing prominence of the right hilum. Lung volumes have   diminished with increasing heterogeneous parenchymal opacities. Multifocal pneumonitis favored over asymmetric edema. Further evaluation with chest CT recommended. No associated effusions. Biapical pleural calcifications are redemonstrated.    CT Chest w Contrast    Impression    IMPRESSION:     1.  Solid heterogeneous attenuation bilateral peripheral lung nodules are stable compared to 07/25/2023. In the setting of known diagnosis of rheumatoid arthritis these are consistent with necrobiotic rheumatoid nodules.  2.  Symmetric mid and upper lung mixed attenuation airspace opacities present in July 2023 have resolved. No findings to suggest acute pneumonitis.  3.  Severe atheromatous coronary calcifications and degenerative thickening of the anterior leaflet of the mitral valve.  4.  New mildly displaced fracture of the manubrium. Unchanged thoracic spine compression deformities.   XR Chest Port 1 View    Impression    IMPRESSION:   1. Interval placement of an endotracheal tube with distal tip in the trachea approximately 4.5 cm to the lo.  2. Moderately extensive patchy airspace opacities within the central aspects of the upper and mid lungs bilaterally, new since the recent comparison study. This most likely relates to  pulmonary edema, but infection could have a similar appearance.   3. No other significant interval change.  4. Nonspecific 2 cm nodular opacity projecting over the lower right lung again noted.   CT Head w/o Contrast    Impression    IMPRESSION:  1.  No discrete mass lesion, hemorrhage or focal area suggestive of acute infarct.  2.  Stable diffuse age related changes.   XR Abdomen Port 1 View    Impression    IMPRESSION: Enteric tube tip in stomach. No visible bowel dilatation. Mitral annular calcification and vascular calcification. Right hip arthroplasty. Degenerative change osseous structures. Nodules right lung base better characterized on CT.   XR Chest Port 1 View    Impression    IMPRESSION: Marked improvement in bilateral perihilar pulmonary  infiltrates when compared to previous. Endotracheal tube remains in  good position above the lo. New NG tube in the stomach. New right  PICC line tip is in the low SVC, in good position.    DEV MAN MD         SYSTEM ID:  [id number]   XR Chest Port 1 View    Impression    IMPRESSION: Moderately extensive groundglass infiltrates in a  perihilar distribution left worse than right have increased since  previous.    DARNELL MCGEE MD         SYSTEM ID:  [id number]   Echocardiogram Limited   Result Value Ref Range    LVEF  55-60%      TOTAL TIME SPENT:  90 minutes.  Time spent in today's visit, review of chart/investigations today, communicating with other providers and documentation today.    Much or all of the text in this note was generated through the use of Dragon dictation, voice to text software. Errors in spelling or words which appear to be out of context are unintentional and may be present due to having escaped editing.

## 2023-10-26 NOTE — CONSULTS
"SPIRITUAL HEALTH SERVICES - Consult Note  [facility] ICU    Referral/Request Source: Palliative/Emotional Support - patient request for Gayla knott    On this visit, provided pt Nazia with a Judaism hymnal. Her youngest son, To, joined us toward the end of the visit.    Nazia reflected on her many years singing in the [employer], including as a child in the [employer]. She and her  knew each other as children and sang in the [employer] choir together.    Nazia's   in . They have five sons. In addition to her grief surrounding her 's death, she also named the death of her mother, a sister, and others. Nazia voiced the loneliness and loss of \"meaningful\" conversation with those who have known her so well. Provided emotional support through reflective conversation and affirmation of her emotions and experience.    To shared the family may like future  support and understand how to request it.     Plan: Will continue to follow while Palliative Care is consulted. Please contact Spiritual Health as needs arise.    Teresita Garzon  Associate   Palliative Care    [facility] routine referrals *[phone]  [facility] available  for emergent requests/referrals, either by paging the on-call  or by entering an ASAP/STAT consult in Epic (this will also page the on-call ).   "

## 2023-10-26 NOTE — PROGRESS NOTES
10/26/23 0930   Appointment Info   Signing Clinician's Name / Credentials (PT) Teresita Mcguire DPT   Rehab Comments (PT) elevated HR to 130-140s bpm during eval   Living Environment   People in Home alone   Current Living Arrangements assisted living   Home Accessibility no concerns   Transportation Anticipated family or friend will provide   Living Environment Comments Pt lives at the [facility]. Normally uses 4WW and has a call button which she can use for any assist with ADLs.   Self-Care   Usual Activity Tolerance moderate   Current Activity Tolerance fair   Regular Exercise Yes   Activity/Exercise Type other (see comments)  (works with HHPT/OT)   Equipment Currently Used at Home walker, rolling   Fall history within last six months yes   Number of times patient has fallen within last six months 1   Activity/Exercise/Self-Care Comment Pt uses 4[facility] in [facility]; she ambulates to dining room for meals; can call and get assist with ADLs with call button. Needs most assist getting dressed, bathing, using toilet/changing brief. 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   Weight-Bearing Status - LUE full weight-bearing   Weight-Bearing Status - RUE full weight-bearing   Weight-Bearing Status - LLE full weight-bearing   Weight-Bearing Status - RLE  full weight-bearing   General Observations on 3 L O2   Cognition   Affect/Mental Status (Cognition) confused   Orientation Status (Cognition) oriented to;person   Cognitive Status Comments confused on situation, time, and place but follows all commands   Pain Assessment   Patient Currently in Pain Yes, see Vital Sign flowsheet   Posture    Posture Forward head position;Protracted shoulders;Kyphosis   Strength (Manual Muscle Testing)   Strength (Manual Muscle Testing) Deficits observed during functional mobility   Bed Mobility   Comment, (Bed Mobility) Mod A supine <> sit   Transfers   Comment, (Transfers) Mod A sit <> stand and bed <> chair transfer   Gait/Stairs (Locomotion)   [place]oca Level (Gait) moderate assist (50% patient effort)   Assistive Device (Gait) walker, front-wheeled   Distance in Feet (Gait) 2'   Balance   Balance Comments unsteady d/t deconditioning, LE weakness   Sensory Examination   Sensory Perception patient reports no sensory changes   Clinical Impression   Criteria for Skilled Therapeutic Intervention Yes, treatment indicated   PT Diagnosis (PT) impaired mobility   Influenced by the following impairments weakness, impaired endurance, impaired balance   Functional limitations due to impairments fall risk, decreased activity tolerance   Clinical Presentation (PT Evaluation Complexity) stable   Clinical Presentation Rationale clinical judgement   Clinical Decision Making (Complexity) low complexity   Planned Therapy Interventions (PT) balance training;bed mobility training;gait training;patient/family education;strengthening;transfer training;progressive activity/exercise   Risk & Benefits of therapy have been explained evaluation/treatment results reviewed;care plan/treatment goals reviewed;risks/benefits reviewed;current/potential barriers reviewed;participants voiced agreement with care plan;participants included;patient;son   PT Total Evaluation Time   PT Eval, Low Complexity Minutes  (63148) 15   Physical Therapy Goals   PT Frequency Daily   PT Predicted Duration/Target Date for Goal Attainment 11/03/23   PT: Bed Mobility Independent;Supine to/from sit   PT: Transfers Modified independent;Sit to/from stand;Bed to/from chair;Assistive device   PT: Gait Supervision/stand-by assist;100 feet;Assistive device   Therapeutic Activity   Therapeutic Activities: dynamic activities to improve functional performance Minutes (00981) 25   Symptoms Noted During/After Treatment Fatigue   Treatment Detail/Skilled Intervention Increased time for line management and vitals monitoring with communication to RN/MD given HR up to 130s-140s sustained with bed <> chair transfer. Pt requires tactile cues for safe bed mobility, sit <> stand transfer, and bed <> chair transfer Mod A x 1 with FWW. Pt limited by B LEs weakness and fear of falling. On 3 L O2 with O2 sats dropping to hihg 80s recovering quickly once seated in chair. Encouraged to perform ankle pumps, heelslides while in chair; reports understanding. All needs in reach, chair alarm on, RN present at end of session. Vitals at end: 121 bpm, O2 at 91% on 3 L O2, 134/95 mmHg   PT Discharge Planning   PT Plan monitor vitals, progress activity and gait with FWW   PT Discharge Recommendation (DC Rec) Transitional Care Facility   PT Rationale for DC Rec Pt currently below baseline, limited by high HR during session but also needing Mod A x 1 for mobility. Recommend TCU to address strength, balance deficits. However, pt from [facility] and can get assist with most ADLs, will continue to assess pt's mobility to consider return to [facility] and HHPT (pt currenlty would need Ax1 with all mobility, 24/7 supervision).   PT Brief overview of current status Mod A with FWW   PT Equipment Needed at Discharge   (has 4WW)   Total Session Time   Timed Code Treatment Minutes 25   Total Session Time (sum of timed and untimed services) 40

## 2023-10-26 NOTE — PLAN OF CARE
Pt. On nasal cannula, diminished lung sounds, dyspnea on exertion when pivoting out of bed to chair, CV: ST - more tachycardia and higher BP after exertion some response to IV hydralazine X1, PO meds. adjusted and added with better BP later in shift, good urine output particularly around lasix doses, one BM, eating good meals, family providing multiple snacks throughout the day, taking good PO, pt. Alert.oriented and pleasant, pt and sons met with palliative care team, pt. Transferred to 8th floor by NANCY on a cart without incident, all belongings verified and sent with pt's son.

## 2023-10-26 NOTE — PROGRESS NOTES
[facility]    Medicine Progress Note - Hospitalist Service    Date of Admission:  10/21/2023    Assessment & Plan     Nazia Goldsmith is an 83-year-old female with recent subdural hematoma, CAD, severe mitral regurgitation, severe pulmonary hypertension, who presented on 10/21/2023 with with acute confusion.  She was found to have acute cystitis due to E. coli.  She was incidentally also found to have manubrial fracture.  She was started on IV Zosyn and IV vancomycin.  She developed acute hypoxic respiratory failure due to flash pulmonary edema on 10/22.  She was intubated and transferred to ICU.  She was diuresed with IV Lasix.  She was subsequently extubated and transferred to hospitalist service 10/24/2023    Acute cystitis without hematuria due to E. Coli  Sepsis due to above  -Presented with fever of 100.9, heart rate 75, WBC 17.8, abnormal UA with pyuria.  Met criteria for sepsis  -Urine culture grew E. coli, pansensitive.  Treated with 4 days of IV ceftriaxone and then switched to p.o. Bactrim on 10/25.  Continue Bactrim    Acute metabolic encephalopathy, due to acute cystitis with sepsis  -Was confused at assisted living facility and thus brought to ER for evaluation.  Encephalopathy of was secondary to acute cystitis with sepsis and has now resolved.  Mental status at baseline    Acute hypoxic respiratory failure requiring intubation and mechanical ventilation-secondary to flash pulmonary edema  -Was intubated on 10/22 and extubated on 10/24.  -Now doing okay on 2 L of supplemental oxygen.  Still experiencing episodes of shortness of breath.    -Confirmed with patient and son, no further intubations.  Continue supplemental oxygen    Recurrent episodes of flash pulmonary edema due to severe mitral regurgitation and hypertension  Acute diastolic CHF exacerbation  Severe pulmonary hypertension  Moderate to severe mitral regurgitation  Moderate mitral stenosis  - Limited echo 10/23  showed normal LV size and function, borderline mild inferior inferior lateral hypokinesis, normal RV size and function, moderate mitral stenosis, 3+ mitral regurgitation, severe left atrial enlargement, mildly dilated IVC with increased filling pressures  - Received some IV fluids on admission due to sepsis and subsequently developed acute respiratory failure secondary to flash pulmonary edema on 10/22.  She was intubated  - Has been diuresed with IV Lasix.  Cardiology was following but signed off on 10/24 with recommendations of aggressive control of blood pressure with goal less than 120 systolic to help mitral regurgitation and pulmonary edema. Valve anatomy not suitable for clipping.  She is a poor candidate for mitral valve replacement  - Had similar episode earlier this year requiring intubation  -Continue Lasix 80 mg every 12 hours  -Monitor I's/O, daily weight  -Palliative care consult to define goals of care    Coronary artery disease - moderate to severe diffuse LAD and RCA disease on angiogram 7/2023   Type II MI with NSTEMI  Noncardiac chest pain  -Chest pain felt to be noncardiac.  It is reproducible with palpation.  Mildly elevated troponin 113 -->84 -->107 felt to be type II MI due to sepsis, flash pulmonary edema  -Limited echo 10/23 showed wall motion abnormality in left circumflex territory.  This has been previously noted.  Cardiology did not recommend any intervention at this time due to recent subdural hematoma, no symptoms of angina, anemia.  - Continue aspirin    Recent subdural hematoma, 10/12/2023  -Head CT on 11/12/2023 showed 3 mm landing on frontal lobe after leaving the room subdural hematoma without intracranial mass effect/brain compression  -Repeat head CT on 10/21 showed resolution of subdural hematoma    Hypokalemia  -Secondary to diuresis.  Potassium replaced.  Monitor    Hypomagnesemia, magnesium 1.5  -Replace    Chronic microcytic anemia  - Hemoglobin stable at 8-9.  No  hematochezia or melena.  - Has history of duodenal ulcers in the past.  - Monitor hemoglobin.  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  Losartan 25 mg daily resumed  -Add hydralazine 25 mg 4 times daily  -Has as needed IV hydralazine available as well  -Monitor blood pressure    T12 compression fracture, age unknown  Manubrial fracture  -Manubrial fracture incidentally noted on CT chest on admission     Hyperlipidemia  - Continue PTA Crestor              Diet: Combination Diet Regular Diet Adult; 2 gm NA Diet    DVT Prophylaxis: Pneumatic Compression Devices  Burch Catheter: Not present  Lines: PRESENT      PICC 10/23/23 Triple Lumen Right Brachial vein medial Ok for immediate use-Site Assessment: WDL      Cardiac Monitoring: ACTIVE order. Indication: Acute decompensated heart failure (48 hours)  Code Status: No CPR- Do NOT Intubate      Clinically Significant Risk Factors        # Hypokalemia: Lowest K = 3.3 mmol/L in last 2 days, will replace as needed     # Hypomagnesemia: Lowest Mg = 1.5 mg/dL in last 2 days, will replace as needed   # Hypoalbuminemia: Lowest albumin = 2.8 g/dL at 10/23/2023  6:18 AM, will monitor as appropriate        # Acute heart failure with preserved ejection fraction: heart failure noted on problem list, last echo with EF >50%, and receiving IV diuretics           # Financial/Environmental Concerns: none         Disposition Plan      Expected Discharge Date: 10/29/2023      Destination: inpatient rehabilitation facility              [facility]  MD Luis  Hospitalist Service  [facility]  Securely message with [employer] (more info)  Text page via [employer] Paging/Directory   ______________________________________________________________________    Interval History   Patient seen in ICU.  Lying in bed.  Reports she feels stuffy in her nose.  Feels some shortness of breath.  Has been tachycardic and hypertensive today.  Antihypertensives were adjusted.  Remains on 2 L of supplemental oxygen.  Weight has decreased from 104 lb to 90lb      Physical Exam   Vital Signs: Temp: 98.4  F (36.9  C) Temp src: Oral BP: (!) 142/65 Pulse: 115   Resp: 11 SpO2: 93 % O2 Device: Nasal cannula Oxygen Delivery: 1.5 LPM  Weight: 90 lbs 9.74 oz    Constitutional - awake and alert, resting in bed, in no acute distress  Cardiovascular -mildly tachycardic, regular rhythm, prominent murmur, no edema   Pulmonary - lungs are clear to auscultation bilaterally, no wheezing or rhonchi  GI - abdomen is soft, nontender, nondistended, no hepatosplenomegaly or masses  Integumentary - skin is warm and dry, no rashes or ulcers  Neurological - awake, alert and oriented x3.  Moving all 4 extremities, normal speech, no focal deficits      Medical Decision Making       ------------------ MEDICAL DECISION MAKING ------------------------------------------------------------------------------------------------------  Medical complexity over the past 24 hours:  -------------------------- MODERATE RISK FOR MORBIDITY --------------------------------------------------      Data     I have personally reviewed the following data over the past 24 hrs:    N/A  \   8.3 (L)   / 197     135 92 (L) 18.9 /  96   3.6 30 (H) 0.87 \       Imaging results reviewed over the past 24 hrs:   No results found for this or any previous visit (from the past 24 hour(s)).

## 2023-10-27 NOTE — PROGRESS NOTES
Orientation: A&Ox4, lethargic, somnolent  Activity: A2 T/R  Diet/BS Checks: Regular as tolerated  Tele: N/A  IV Access/Drains: R PIV, R triple lumen PICC  Pain Management: PRN IV morphine  Abnormal VS/Results: Deferred, 3L O2 for comfort   Bowel/Bladder: Incont of b/b, purewick  Skin/Wounds:   Consults: Palliative  D/C Disposition: TBD     Other Info:   -Comfort cares. Utilizing IV morphine for pain or SOB. Some difficulty swallowing oral pills.       please call sonYosi ([phone]) with any changes in pt status if he is not present

## 2023-10-27 NOTE — PLAN OF CARE
Physical Therapy Discharge Summary    Reason for therapy discharge:    Patient/family request discontinuation of services.    Progress towards therapy goal(s). See goals on Care Plan in [facility] electronic health record for goal details.  Goals not met.  Barriers to achieving goals:   limited tolerance for therapy.    Therapy recommendation(s):    Transitioned to comfort cares, returning to [facility]

## 2023-10-27 NOTE — PROGRESS NOTES
[facility]    Medicine Progress Note - Hospitalist Service    Date of Admission:  10/21/2023    Assessment & Plan     Nazia Goldsmith is an 83-year-old female with recent subdural hematoma, CAD, severe mitral regurgitation, severe pulmonary hypertension, who presented on 10/21/2023 with with acute confusion.  She was found to have acute cystitis due to E. coli.  She was incidentally also found to have manubrial fracture.  She was started on IV Zosyn and IV vancomycin.  She developed acute hypoxic respiratory failure due to flash pulmonary edema on 10/22.  She was intubated and transferred to ICU.  She was diuresed with IV Lasix.  She was subsequently extubated and transferred to hospitalist service 10/24/2023.  After discussion with palliative care, patient transition to comfort cares on 10/26/2023    Acute cystitis without hematuria due to E. Coli  Sepsis due to above  -Presented with fever of 100.9, heart rate 75, WBC 17.8, abnormal UA with pyuria.  Met criteria for sepsis  -Urine culture grew E. coli, pansensitive.  Treated with 4 days of IV ceftriaxone and then switched to p.o. Bactrim on 10/25.  Has completed total 7 days of antibiotics on 10/26     Acute metabolic encephalopathy, due to acute cystitis with sepsis  -Was confused at assisted living facility and thus brought to ER for evaluation.  Encephalopathy of was secondary to acute cystitis with sepsis and has now resolved.  Mental status at baseline    Acute hypoxic respiratory failure requiring intubation and mechanical ventilation-secondary to flash pulmonary edema  -Was intubated on 10/22 and extubated on 10/24.  -Now doing okay on 2 L of supplemental oxygen.  Still experiencing episodes of shortness of breath.    -Continue supplemental oxygen  -As needed morphine for shortness of breath    Recurrent episodes of flash pulmonary edema due to severe mitral regurgitation and hypertension  Acute diastolic CHF exacerbation  Severe  pulmonary hypertension  Moderate to severe mitral regurgitation  Moderate mitral stenosis  - Limited echo 10/23 showed normal LV size and function, borderline mild inferior inferior lateral hypokinesis, normal RV size and function, moderate mitral stenosis, 3+ mitral regurgitation, severe left atrial enlargement, mildly dilated IVC with increased filling pressures  - Received some IV fluids on admission due to sepsis and subsequently developed acute respiratory failure secondary to flash pulmonary edema on 10/22.  She was intubated  - Has been diuresed with IV Lasix.  Cardiology was following but signed off on 10/24 with recommendations of aggressive control of blood pressure with goal less than 120 systolic to help mitral regurgitation and pulmonary edema. Valve anatomy not suitable for clipping.  She is a poor candidate for mitral valve replacement  - Had similar episode earlier this year requiring intubation  -Palliative care consulted.  Patient transition to comfort cares on 10/26  -Continue Coreg, losartan.  Started on amlodipine and discontinue hydralazine  -Continue Lasix 40 mg twice daily      Coronary artery disease - moderate to severe diffuse LAD and RCA disease on angiogram 7/2023   Type II MI with NSTEMI  Noncardiac chest pain  -Chest pain felt to be noncardiac.  It is reproducible with palpation.  Mildly elevated troponin 113 -->84 -->107 felt to be type II MI due to sepsis, flash pulmonary edema  -Limited echo 10/23 showed wall motion abnormality in left circumflex territory.  This has been previously noted.  Cardiology did not recommend any intervention at this time due to recent subdural hematoma, no symptoms of angina, anemia.  -Continue aspirin     Recent subdural hematoma, 10/12/2023  -Head CT on 11/12/2023 showed 3 mm landing on frontal lobe after leaving the room subdural hematoma without intracranial mass effect/brain compression  -Repeat head CT on 10/21 showed resolution of subdural  hematoma    Hypokalemia  -Secondary to diuresis.  Potassium replaced.     Hypomagnesemia, magnesium 1.5  -Replaced    Chronic microcytic anemia  - Hemoglobin stable at 8-9.  No hematochezia or melena.  - Has history of duodenal ulcers in the past.  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  Will discontinue at discharge             Diet: Regular Diet Adult    DVT Prophylaxis: Not required as patient is on comfort cares   Burch Catheter: Not present  Lines: PRESENT      PICC 10/23/23 Triple Lumen Right Brachial vein medial Ok for immediate use-Site Assessment: WDL      Cardiac Monitoring: None  Code Status: No CPR- Do NOT Intubate      Clinically Significant Risk Factors        # Hypokalemia: Lowest K = 3.3 mmol/L in last 2 days, will replace as needed     # Hypomagnesemia: Lowest Mg = 1.5 mg/dL in last 2 days, will replace as needed   # Hypoalbuminemia: Lowest albumin = 2.8 g/dL at 10/23/2023  6:18 AM, will monitor as appropriate        # Acute heart failure with preserved ejection fraction: heart failure noted on problem list, last echo with EF >50%, and receiving IV diuretics           # Financial/Environmental Concerns: none         Disposition Plan     Expected Discharge Date: 10/29/2023       Destination: inpatient rehabilitation facility              Radha Smith MD  Hospitalist Service  [facility]  Securely message with [employer] (more info)  Text page via [employer] Paging/Directory   ______________________________________________________________________    Interval History   Patient seen in room.  She transition to comfort cares on 10/26.  Reports she is doing okay.  Experiences shortness of breath with activity such as eating but otherwise feels comfortable.  Remains on supplemental oxygen.  Denies pain       Physical Exam   Vital Signs: Temp: 98.1  F (36.7  C) Temp src: Oral BP: 112/77 Pulse: 116   Resp: 16 SpO2: 95 %      Weight: 90 lbs 9.74 oz    Constitutional - awake and alert, resting in bed, in no acute distress  Integumentary - skin is warm and dry, no rashes or ulcers  Neurological - awake, alert and oriented x3.  Moving all 4 extremities, normal speech, no focal deficits      Medical Decision Making       ------------------ MEDICAL DECISION MAKING ------------------------------------------------------------------------------------------------------  Medical complexity over the past 24 hours:  -------------------------- MODERATE RISK FOR MORBIDITY --------------------------------------------------      Data         Imaging results reviewed over the past 24 hrs:   No results found for this or any previous visit (from the past 24 hour(s)).

## 2023-10-27 NOTE — PROGRESS NOTES
DATE & TIME: 10/26   Cognitive Concerns/ Orientation: A&Ox4  BEHAVIOR & AGGRESSION TOOL COLOR: Green  ABNL VS/O2: not done, comfort care  MOBILITY: Assist of 1-2 with GB/walker to chair  PAIN MANAGMENT: Denies   DIET: Regular, fair appetite  BOWEL/BLADDER: Incontinent of bowel and bladder, Uses purewick   ABNL LAB/BG: No labs, comfort care.  DRAIN/DEVICES: PICC  SKIN: Scattered bruising, callus under left toes, right foot smaller than left d/t the rheumatoid arthritis surgery. Rheumatoid arthritis both arms. Scab on left shin from old wound.  D/C DAY/GOALS/PLACE: pending as comfort cares

## 2023-10-27 NOTE — PLAN OF CARE
Goal Outcome Evaluation:    Orientation: A&Ox4, lethargic, somnolent  Activity: A2 lift, not OOB, T/R as tolerated/ requested  Diet/BS Checks: Regular  Tele: N/A  IV Access/Drains: R PIV, R triple lumen PICC  Pain Management: PRN tylenol, morphine  Abnormal VS/Results: Deferred, 3L O2 for comfort   Bowel/Bladder: Incont of b/b, PW in place  Skin/Wounds:   Consults: Palliative  D/C Disposition: TBD    Other Info:   -Comfort cares, please call sonYosi ([phone]) with any changes in pt status  -Prefers meds to be IV, was nauseous after PO pills earlier in the night

## 2023-10-27 NOTE — CONSULTS
Care Management Follow Up    Length of Stay (days): 6    Expected Discharge Date: 10/30/2023     Concerns to be Addressed: all concerns addressed in this encounter     Patient plan of care discussed at interdisciplinary rounds: Yes    Anticipated Discharge Disposition: Transitional Care     Anticipated Discharge Services: None  Anticipated Discharge DME: None    Patient/family educated on Medicare website which has current facility and service quality ratings:    Education Provided on the Discharge Plan:    Patient/Family in Agreement with the Plan: yes    Referrals Placed by CM/SW:    Private pay costs discussed: Not applicable    Additional Information:  Writer called patients [facility] and spoke with Giselle MAY ([phone]) to discuss patient. Patient was pretty independent at baseline before coming to the hospital. Writer updated Giselle that patient has transitioned to comfort cares. Giselle states that they would be able to accept patient back into the Care suites part of the facility but would need all of the equipment as there is no furniture in the room. Giselle states that patient is able to return over the weekend and we can call her number listed if that is what the family would like.     Writer called patients son Yosi and introduced self and role. Yosi states that he was told by hospital staff that he could potentially keep patient here to pass. Writer explained that right now it appears his mom could safely transfer to a lower level of care. Yosi understood. Yosi is debating between a hospice home vs going back to the [facility]. Yosi states that he is going to talk to the rest of the family and his mom and see what they are thinking. Writer went over averages of hospice home prices and Yosi understood. Yosi asked about [facility] as patient would like to try to be in [facility]. Writer explained we can definitely look into that as well. Yosi was going to talk with his family and get back to  this  weekend.     HANDY Wilson

## 2023-10-28 NOTE — PROVIDER NOTIFICATION
MD Notification    Notified Person: MD    Notified Person Name: Dr Smith    Notification Date/Time: 10/28/2023 @ 0844    Notification Interaction: Osmanimelissa    Purpose of Notification: patient BP 94/50, no parameter for BP meds, wondering whether to give BP meds, on comfort care.     Orders Received: Hold the med, MD will review    Comments:

## 2023-10-28 NOTE — PROVIDER NOTIFICATION
MD Notification     Notified Person: MD     Notified Person Name: Dr. Gardnerpina     Notification Date/Time: 10/28/2023 @ 1505     Notification Interaction: Vocmelissa     Purpose of Notification: Family (three sons @ bedside) said, they want the all medications to be discontinued except lasix and comfort meds. thanks     Orders Received: MD acknowledged.     Comments:

## 2023-10-28 NOTE — PROGRESS NOTES
[facility]    Medicine Progress Note - Hospitalist Service    Date of Admission:  10/21/2023    Assessment & Plan     Nazia Goldsmith is an 83-year-old female with recent subdural hematoma, CAD, severe mitral regurgitation, severe pulmonary hypertension, who presented on 10/21/2023 with with acute confusion.  She was found to have acute cystitis due to E. coli.  She was incidentally also found to have manubrial fracture.  She was started on IV Zosyn and IV vancomycin.  She developed acute hypoxic respiratory failure due to flash pulmonary edema on 10/22.  She was intubated and transferred to ICU.  She was diuresed with IV Lasix.  She was subsequently extubated and transferred to hospitalist service 10/24/2023.  After discussion with palliative care, patient transition to comfort cares on 10/26/2023    Acute cystitis without hematuria due to E. Coli  Sepsis due to above  -Presented with fever of 100.9, heart rate 75, WBC 17.8, abnormal UA with pyuria.  Met criteria for sepsis  -Urine culture grew E. coli, pansensitive.  Treated with 4 days of IV ceftriaxone and then switched to p.o. Bactrim on 10/25.  Has completed total 7 days of antibiotics on 10/26     Acute metabolic encephalopathy, due to acute cystitis with sepsis  -Was confused at assisted living facility and thus brought to ER for evaluation.  Encephalopathy of was secondary to acute cystitis with sepsis and has now resolved.  Mental status at baseline    Acute hypoxic respiratory failure requiring intubation and mechanical ventilation-secondary to flash pulmonary edema  -Was intubated on 10/22 and extubated on 10/24.  -Now doing okay on 2 L of supplemental oxygen.  Still experiencing episodes of shortness of breath.    -Continue supplemental oxygen  -As needed morphine for shortness of breath    Recurrent episodes of flash pulmonary edema due to severe mitral regurgitation and hypertension  Acute diastolic CHF exacerbation  Severe  pulmonary hypertension  Moderate to severe mitral regurgitation  Moderate mitral stenosis  - Limited echo 10/23 showed normal LV size and function, borderline mild inferior inferior lateral hypokinesis, normal RV size and function, moderate mitral stenosis, 3+ mitral regurgitation, severe left atrial enlargement, mildly dilated IVC with increased filling pressures  - Received some IV fluids on admission due to sepsis and subsequently developed acute respiratory failure secondary to flash pulmonary edema on 10/22.  She was intubated  - Has been diuresed with IV Lasix.  Cardiology was following but signed off on 10/24 with recommendations of aggressive control of blood pressure with goal less than 120 systolic to help mitral regurgitation and pulmonary edema. Valve anatomy not suitable for clipping.  She is a poor candidate for mitral valve replacement  - Had similar episode earlier this year requiring intubation  - Palliative care consulted.  Patient transitioned to comfort cares on 10/26  -Blood pressure lowish on 10/28.  Decrease Coreg to 12.5 mg twice daily.  Discontinue losartan and amlodipine.  -Decrease Lasix to 20 mg daily       Coronary artery disease - moderate to severe diffuse LAD and RCA disease on angiogram 7/2023   Type II MI with NSTEMI  Noncardiac chest pain  -Chest pain felt to be noncardiac.  It is reproducible with palpation.  Mildly elevated troponin 113 -->84 -->107 felt to be type II MI due to sepsis, flash pulmonary edema  -Limited echo 10/23 showed wall motion abnormality in left circumflex territory.  This has been previously noted.  Cardiology did not recommend any intervention at this time due to recent subdural hematoma, no symptoms of angina, anemia.  -Discontinue aspirin    Recent subdural hematoma, 10/12/2023  -Head CT on 11/12/2023 showed 3 mm landing on frontal lobe after leaving the room subdural hematoma without intracranial mass effect/brain compression  -Repeat head CT on 10/21  showed resolution of subdural hematoma    Hypokalemia  -Secondary to diuresis.  Potassium replaced.   -No further checks as patient is now comfort cares    Hypomagnesemia, magnesium 1.5  -Replaced  -No further checks as patient is now comfort cares    Chronic microcytic anemia  - Hemoglobin stable at 8-9.  No hematochezia or melena.  - Has history of duodenal ulcers in the past.  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    -Discontinue losartan and amlodipine   -Decrease carvedilol to 12.5 mg twice daily     T12 compression fracture, age unknown  Manubrial fracture  -Manubrial fracture incidentally noted on CT chest on admission     Hyperlipidemia  -Discontinue rosuvastatin     PICC in place, continue while hospitalized.  Will discontinue at discharge    Met with multiple family members on 10/28.  They wanted to know her prognosis.  Her overall prognosis is poor.  She is not imminently dying.  An episode of flash pulmonary edema causing respiratory distress/failure can occur anytime but is unpredictable.  At this time best approach would be to discharge patient to a facility on hospice.    Also discussed with patient if she would want to discontinue all medications and just continue comfort care medications.  She is undecided.  We will minimize medicines but not completely discontinue at this time         Diet: Regular Diet Adult    DVT Prophylaxis: Not required as patient is on comfort cares   Burch Catheter: Not present  Lines: PRESENT       PICC 10/23/23 Triple Lumen Right Brachial vein medial Ok for immediate use-Site Assessment: WDL      Cardiac Monitoring: None  Code Status: No CPR- Do NOT Intubate      Clinically Significant Risk Factors              # Hypoalbuminemia: Lowest albumin = 2.8 g/dL at 10/23/2023  6:18 AM, will monitor as appropriate        # Acute heart failure with preserved ejection fraction: heart failure noted on problem list, last echo with EF >50%, and receiving IV diuretics           # Financial/Environmental Concerns: none         Disposition Plan      Expected Discharge Date: 10/29/2023      Destination: inpatient rehabilitation facility              Radha Smith MD  Hospitalist Service  [facility]  Securely message with [employer] (more info)  Text page via [facility] Paging/Directory   ______________________________________________________________________    Interval History   Patient seen in room.  Reports feeling dizzy today.  Denies any shortness of breath.  Remains on supplemental oxygen.          Physical Exam   Vital Signs: Temp: 97.7  F (36.5  C) Temp src: Oral BP: 94/50 Pulse: 93   Resp: 16 SpO2: 96 % O2 Device: Nasal cannula Oxygen Delivery: 3 LPM  Weight: 90 lbs 9.74 oz    Constitutional - awake and alert, resting in bed, in no acute distress  Integumentary - skin is warm and dry, no rashes or ulcers  Neurological - awake, alert and oriented x3.  Moving all 4 extremities, normal speech, no focal deficits      Medical Decision Making       ------------------ MEDICAL DECISION MAKING ------------------------------------------------------------------------------------------------------  Medical complexity over the past 24 hours:  -------------------------- LOW RISK FOR MORBIDITY -------------------------------------------------------------      Data         Imaging results reviewed over the past 24 hrs:   No results found for this or any previous visit (from the past 24 hour(s)).

## 2023-10-28 NOTE — PLAN OF CARE
Goal Outcome Evaluation:       2300-120  Orientation: MALAIKA- patient not able to answer orientation questions. Arouses to touch and answers yes or no questions.   Activity: A2 w/ lift, T/R   Diet/BS Checks: Regular  Tele: N/A  IV Access/Drains: R PIV, R triple lumen PICC  Pain Management: PRN tylenol and morphine available- 0.1 mg morphine given x1 this shift.  Abnormal VS/Results: Assessments deferred due to comfort care status. 3L O2 for comfort   Bowel/Bladder: Incont of b/b, PW in place  Skin/Wounds:   Consults: Palliative  D/C Disposition: TBD- potential discharge to hospice facility, SW following.     Other Info:

## 2023-10-28 NOTE — PROGRESS NOTES
"Care Management Follow Up    Length of Stay (days): 7    Expected Discharge Date: 10/29/2023     Concerns to be Addressed: discharge planning, hospice   Patient plan of care discussed at interdisciplinary rounds: Yes    Anticipated Discharge Disposition: unknown      Anticipated Discharge Services: None  Anticipated Discharge DME: None    Referrals Placed by CM/SW:    Private pay costs discussed: private room/amenity fees    Additional Information:  SW met with patient's family: Yosi, Richie, Nikhil and daughter in law Mary. SW provided brief information regarding different settings for hospice, including going back to the [facility] care suites, a residential hospice facility, or a skilled nursing facility. Discussed selecting a hospice agency to provide the hospice services. The family states they cannot make any decisions until they have a discussion about the patient's prognosis. They state that patient is \"actively dying\" and they believe she can stay here. SW said they are under the understanding patient is safe for discharge. SW messaged hospitalist about family's concerns and left information with the family to make decisions about placement.    Krystina Ferro, STEPHEN, SW   Social Work   [facility]        "

## 2023-10-28 NOTE — PLAN OF CARE
Goal Outcome Evaluation:       Patient is alert to self. On comfort care. Up with mechanical lift. Assist with feeding, good appetite today. Complains of intermittent back pain and dyspnea with activity, managed with morphine. On 2L NC for comfort. PICC patent on RUE. Incontinent of bowel and bladder. Turn and repo. Will continue with plan of care.

## 2023-10-28 NOTE — PLAN OF CARE
10/-[id number]    Summary:  Nazia Goldsmith is an 83-year-old female with recent subdural hematoma, CAD, severe mitral regurgitation, severe pulmonary hypertension, who presented on 10/21/2023 with with acute confusion.  She was found to have acute cystitis due to E. coli.  She was incidentally also found to have manubrial fracture.     Primary Diagnosis: Acute cystitis without hematuria due to E. Coli  Sepsis Acute metabolic  encephalopathy, due to acute cystitis with sepsis ,Acute hypoxic respiratory failure requiring intubation and mechanical ventilation-secondary to flash pulmonary edema     Orientation: A&Ox3, lethargic, somnolent    Activity: A2 T/R    Diet/BS Checks: Regular as tolerated    Tele:  NA    IV Access/Drains: R PIV, R triple lumen PICC    Pain Management: PRN IV morphine, denies pain    Abnormal VS/Results: Deferred, 3L O2 for comfort     Bowel/Bladder: Incont of b/b, purewick    Skin/Wounds: bruises on both hands    Consults: Palliative    D/C Disposition: TBD, Son will contact     Other Info: VS and all assessment deferred due to comfort cares. No SOB noted in this shift. Pt has nausea when taking food. Taking pills whole with water.   call Yosi pretty ([phone]) with any changes in pt status if he is not presentcall Yosi pretty ([phone]) with any changes in pt status if he is not present

## 2023-10-29 NOTE — PROGRESS NOTES
Care Management Follow Up    Length of Stay (days): 8    Expected Discharge Date: 10/29/2023     Concerns to be Addressed: hospice  Patient plan of care discussed at interdisciplinary rounds: Yes    Anticipated Discharge Disposition: Home with hospice  Anticipated Discharge Services: Hospice   Anticipated Discharge DME: None    Referrals Placed by CM/SW:  hospice  Private pay costs discussed: Not applicable    Additional Information:  UZMA spoke to Yosi over the phone and family and patient have decided to go back to the [facility]. The care suites are located at [address] [place] [address]. She needs equipment ordered through the hospice agency: hospital bed, bedside table, oxygen, wheelchair, lift. Family can then move her personal belongings downstairs to her new suite. Family is meeting at 1000 to select agency. [facility] preference is [facility]. Told son that it is possible everything could come together for discharge today. UZMA spoke to Giselle at the [facility], she confirmed that today would work but all equipment must be there before the patient.     ADD: 1100  Patient's family found info for [facility] hospice online, are asking for referral. UZMA unable to locate any contact specifically for home hospice services. Called several different numbers for home services that continued to ring with no answer. No local lines mention hospice services.     Met with patient's son Yosi and updated him on this. He met with the rest of the family and they are agreeable to [facility]. Spoke to Nora at [facility] intake ([phone]) to provide referral information. She could not confirm fax for referral. Someone will reach out to UZMA via 88 cell phone.     Called Giselle at the [facility] and updated her that discharge not likely to take place today.     ADD: 1500  UZMA exchanged voicemails with [facility] Kaycee at [phone] to discuss patient referral for home hospice.    Krystina Ferro, MSW, LGSW   Social  Work   [facility]

## 2023-10-29 NOTE — PROGRESS NOTES
[facility]    Medicine Progress Note - Hospitalist Service    Date of Admission:  10/21/2023    Assessment & Plan     Nazia Goldsmith is an 83-year-old female with recent subdural hematoma, CAD, severe mitral regurgitation, severe pulmonary hypertension, who presented on 10/21/2023 with with acute confusion.  She was found to have acute cystitis due to E. coli.  She was incidentally also found to have manubrial fracture.  She was started on IV Zosyn and IV vancomycin.  She developed acute hypoxic respiratory failure due to flash pulmonary edema on 10/22.  She was intubated and transferred to ICU.  She was diuresed with IV Lasix.  She was subsequently extubated and transferred to hospitalist service 10/24/2023.  After discussion with palliative care, patient transition to comfort cares on 10/26/2023    Acute cystitis without hematuria due to E. Coli  Sepsis due to above  -Presented with fever of 100.9, heart rate 75, WBC 17.8, abnormal UA with pyuria.  Met criteria for sepsis  -Urine culture grew E. coli, pansensitive.  Treated with 4 days of IV ceftriaxone and then switched to p.o. Bactrim on 10/25.  Has completed total 7 days of antibiotics on 10/26     Acute metabolic encephalopathy, due to acute cystitis with sepsis  -Was confused at assisted living facility and thus brought to ER for evaluation.  Encephalopathy of was secondary to acute cystitis with sepsis and has now resolved.  Mental status at baseline    Acute hypoxic respiratory failure requiring intubation and mechanical ventilation-secondary to flash pulmonary edema  -Was intubated on 10/22 and extubated on 10/24.  -Now doing okay on 2 L of supplemental oxygen.  Still experiencing episodes of shortness of breath.    -Continue supplemental oxygen  -As needed morphine for shortness of breath    Recurrent episodes of flash pulmonary edema due to severe mitral regurgitation and hypertension  Acute diastolic CHF exacerbation  Severe  pulmonary hypertension  Moderate to severe mitral regurgitation  Moderate mitral stenosis  - Limited echo 10/23 showed normal LV size and function, borderline mild inferior inferior lateral hypokinesis, normal RV size and function, moderate mitral stenosis, 3+ mitral regurgitation, severe left atrial enlargement, mildly dilated IVC with increased filling pressures  - Received some IV fluids on admission due to sepsis and subsequently developed acute respiratory failure secondary to flash pulmonary edema on 10/22.  She was intubated  - Has been diuresed with IV Lasix.  Cardiology was following but signed off on 10/24 with recommendations of aggressive control of blood pressure with goal less than 120 systolic to help mitral regurgitation and pulmonary edema. Valve anatomy not suitable for clipping.  She is a poor candidate for mitral valve replacement  - Had similar episode earlier this year requiring intubation  - Palliative care consulted.  Patient transitioned to comfort cares on 10/26  - Decrease Lasix to 20 mg daily   - All antihypertensives discontinued as per patient's preference      Coronary artery disease - moderate to severe diffuse LAD and RCA disease on angiogram 7/2023   Type II MI with NSTEMI  Noncardiac chest pain  -Chest pain felt to be noncardiac.  It is reproducible with palpation.  Mildly elevated troponin 113 -->84 -->107 felt to be type II MI due to sepsis, flash pulmonary edema  -Limited echo 10/23 showed wall motion abnormality in left circumflex territory.  This has been previously noted.  Cardiology did not recommend any intervention at this time due to recent subdural hematoma, no symptoms of angina, anemia.  -Discontinued aspirin    Recent subdural hematoma, 10/12/2023  -Head CT on 11/12/2023 showed 3 mm landing on frontal lobe after leaving the room subdural hematoma without intracranial mass effect/brain compression  -Repeat head CT on 10/21 showed resolution of subdural  hematoma    Hypokalemia  -Secondary to diuresis.  Potassium replaced.   -No further checks as patient is now comfort cares    Hypomagnesemia, magnesium 1.5  -Replaced  -No further checks as patient is now comfort cares    Chronic microcytic anemia  - Hemoglobin stable at 8-9.  No hematochezia or melena.  - Has history of duodenal ulcers in the past.  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  Will discontinue at discharge             Diet: Regular Diet Adult    DVT Prophylaxis: Not required as patient is on comfort cares   Burch Catheter: Not present  Lines: PRESENT      PICC 10/23/23 Triple Lumen Right Brachial vein medial Ok for immediate use-Site Assessment: WDL      Cardiac Monitoring: None  Code Status: No CPR- Do NOT Intubate      Clinically Significant Risk Factors              # Hypoalbuminemia: Lowest albumin = 2.8 g/dL at 10/23/2023  6:18 AM, will monitor as appropriate        # Chronic heart failure with preserved ejection fraction: heart failure noted on problem list and last echo with EF >50%           # Financial/Environmental Concerns: none         Disposition Plan      Expected Discharge Date: 10/29/2023      Destination:  inpatient rehabilitation facility              Radha Smith MD  Hospitalist Service  [facility]  Securely message with [employer] (more info)  Text page via [email or web address] Paging/Directory   ______________________________________________________________________    Interval History   Patient seen in room.  Reports feeling dizzy on some occasions.  Denies any shortness of breath.  Remains on supplemental oxygen.          Physical Exam   Vital Signs:     BP: 116/55 Pulse: 94   Resp: 16 SpO2: 94 % O2 Device: Nasal cannula Oxygen Delivery: 2 LPM  Weight: 90 lbs 9.74 oz    Constitutional - awake and alert, resting in bed, in no acute distress  Integumentary - skin is warm and dry, no rashes or ulcers  Neurological - awake, alert and oriented x3.  Moving all 4 extremities, normal speech, no focal deficits      Medical Decision Making       ------------------ MEDICAL DECISION MAKING ------------------------------------------------------------------------------------------------------  Medical complexity over the past 24 hours:  -------------------------- LOW RISK FOR MORBIDITY -------------------------------------------------------------      Data         Imaging results reviewed over the past 24 hrs:   No results found for this or any previous visit (from the past 24 hour(s)).

## 2023-10-29 NOTE — PLAN OF CARE
Reason for Admission: Pneumonitis, Subdural hematoma, Respiratory failure    Cognitive Concerns/ Orientation : alert and oriented x3, disoriented to time. Speech is clear   BEHAVIOR & AGGRESSION TOOL COLOR: Green  Neuro/CMS intact except generalized weakness  VS/O2: BP remained stable. On O2 2 liters NC for comfort.  CARDIOPULMONARY: denies chest pain and SOB  MOBILITY: Bedrest  PAIN MANAGMENT: Denies. Offered prn Roxanol and ativan-patient declined.   DIET: Regular, fair appetite  BOWEL/BLADDER: incontinent of urine, using purewick  ABNL LAB/BG: None-comfort care  DRAIN/DEVICES: PICC SL to RUE  TELEMETRY RHYTHM: None  SKIN: slight blanchable redness to coccyx. Scab to left shin  TESTS/PROCEDURES: None  D/C DATE and PLACEMENT: possible tomorrow to [facility] with [facility] hospice service.    OTHER IMPORTANT INFO and PLAN: Comfort care. Denies pain throughout this shift. Offered prn Roxanol and ativan, patient declined. Patients three sons here and aware of patient declining pain medications. Turn and repo done.

## 2023-10-29 NOTE — PLAN OF CARE
Goal Outcome Evaluation:  [id number]    Orientation: AOxself. Arouses to voice/touch. Lethargic throughout shift.  Activity: A2 w/ lift, T/R-pt refused and said she was comfortable  Diet/BS Checks: Regular-requires assistance with feeding.  Tele: N/A  IV Access/Drains: R triple lumen PICC  Pain Management: denied pain during shift. PRN tylenol and morphine available  Abnormal VS/Results: Assessments deferred due to comfort care status. 2L O2 for comfort   Bowel/Bladder: Incont of b/b, PW in place-dark aiyana UOP. No BM this shift.  Skin/Wounds: n/a  Consults: Palliative  D/C Disposition: TBD- potential discharge to hospice facility, SW following.

## 2023-10-30 NOTE — PROGRESS NOTES
Oxygen Documentation  I certify that this patient, Nazia Goldsmith has been under my care (or a nurse practitioner or physican's assistant working with me). This is the face-to-face encounter for oxygen medical necessity.      At the time of this encounter supplemental oxygen is reasonable and necessary and is expected to improve the patient's condition in a home setting.       Patient has continued oxygen desaturation due to Chronic Heart Failure I50  Comfort cares.    If portability is ordered, is the patient mobile within the home? yes        Radha Smith MD on 10/30/2023 at 11:56 AM

## 2023-10-30 NOTE — PROGRESS NOTES
Care Management Follow Up    Length of Stay (days): 9    Expected Discharge Date: 10/31/2023     Concerns to be Addressed: all concerns addressed in this encounter     Patient plan of care discussed at interdisciplinary rounds: Yes    Anticipated Discharge Disposition: Transitional Care     Anticipated Discharge Services: None  Anticipated Discharge DME: None    Patient/family educated on Medicare website which has current facility and service quality ratings:    Education Provided on the Discharge Plan:    Patient/Family in Agreement with the Plan: yes    Referrals Placed by CM/SW:    Private pay costs discussed: transportation costs    Additional Information:  Writer talked with Zeny from [facility]. Zeny states they can do an admission tomorrow at 1pm. Zeny will order a bed, bedside table, oxygen, lift, wheelchair, and commode for patient to be delivered today. Writer talked to patients son who was in agreement with the plan. Writer called Giselle ([phone]) with [facility] who confirmed the plan. Giselle states that a 10am transport would be great for patient. Giselle would like orders faxed to [phone]. 3-days meds will need to be sent to the pharmacy and filled and bubble packed. Giselle states they would prefer solu-tabs as they cannot have liquids at the facility. Writer set up transportation via stretcher due to oxygen and hospice for tomorrow [phone]. MD Updated to send medications down today. PCS completed and faxed     HANDY Wilson

## 2023-10-30 NOTE — PLAN OF CARE
Goal Outcome Evaluation:  [id number]    Orientation: AOxself. Arouses to voice/touch. Lethargic throughout shift.  Activity: A2 w/ lift, T/R-pt refused and said she was comfortable throughout the shift.  Diet/BS Checks: Regular-requires assistance with feeding.  Tele: N/A  IV Access/Drains: R triple lumen PICC-flushed and blood return noted.  Pain Management: denied pain during shift. PRN tylenol and morphine available  Abnormal VS/Results: Assessments deferred due to comfort care status. 2L O2 for comfort   Bowel/Bladder: Incont of b/b, PW in place-dark aiyana UOP. 1 BM this shift.  Skin/Wounds: Preventative Mepi on bottom.  Consults: Palliative  D/C Disposition: Plan to discharge to [facility] with [facility].

## 2023-10-30 NOTE — PROGRESS NOTES
[facility]    Medicine Progress Note - Hospitalist Service    Date of Admission:  10/21/2023    Assessment & Plan     Nazia Goldsmith is an 83-year-old female with recent subdural hematoma, CAD, severe mitral regurgitation, severe pulmonary hypertension, who presented on 10/21/2023 with with acute confusion.  She was found to have acute cystitis due to E. coli.  She was incidentally also found to have manubrial fracture.  She was started on IV Zosyn and IV vancomycin.  She developed acute hypoxic respiratory failure due to flash pulmonary edema on 10/22.  She was intubated and transferred to ICU.  She was diuresed with IV Lasix.  She was subsequently extubated and transferred to hospitalist service 10/24/2023.  After discussion with palliative care, patient transition to comfort cares on 10/26/2023    Acute cystitis without hematuria due to E. Coli  Sepsis due to above  -Presented with fever of 100.9, heart rate 75, WBC 17.8, abnormal UA with pyuria.  Met criteria for sepsis  -Urine culture grew E. coli, pansensitive.  Treated with 4 days of IV ceftriaxone and then switched to p.o. Bactrim on 10/25.  Has completed total 7 days of antibiotics on 10/26     Acute metabolic encephalopathy, due to acute cystitis with sepsis  -Was confused at assisted living facility and thus brought to ER for evaluation.  Encephalopathy of was secondary to acute cystitis with sepsis and has now resolved.  Mental status at baseline    Acute hypoxic respiratory failure requiring intubation and mechanical ventilation-secondary to flash pulmonary edema  -Was intubated on 10/22 and extubated on 10/24.  -Now doing okay on 2 L of supplemental oxygen.  Still experiencing episodes of shortness of breath.    -Continue supplemental oxygen  -As needed morphine for shortness of breath    Recurrent episodes of flash pulmonary edema due to severe mitral regurgitation and hypertension  Acute diastolic CHF exacerbation  Severe  pulmonary hypertension  Moderate to severe mitral regurgitation  Moderate mitral stenosis  - Limited echo 10/23 showed normal LV size and function, borderline mild inferior inferior lateral hypokinesis, normal RV size and function, moderate mitral stenosis, 3+ mitral regurgitation, severe left atrial enlargement, mildly dilated IVC with increased filling pressures  - Received some IV fluids on admission due to sepsis and subsequently developed acute respiratory failure secondary to flash pulmonary edema on 10/22.  She was intubated  - Has been diuresed with IV Lasix.  Cardiology was following but signed off on 10/24 with recommendations of aggressive control of blood pressure with goal less than 120 systolic to help mitral regurgitation and pulmonary edema. Valve anatomy not suitable for clipping.  She is a poor candidate for mitral valve replacement  - Had similar episode earlier this year requiring intubation  - Palliative care consulted.  Patient transitioned to comfort cares on 10/26  - Decrease Lasix to 20 mg daily   - All antihypertensives discontinued as per patient's preference      Coronary artery disease - moderate to severe diffuse LAD and RCA disease on angiogram 7/2023   Type II MI with NSTEMI  Noncardiac chest pain  -Chest pain felt to be noncardiac.  It is reproducible with palpation.  Mildly elevated troponin 113 -->84 -->107 felt to be type II MI due to sepsis, flash pulmonary edema  -Limited echo 10/23 showed wall motion abnormality in left circumflex territory.  This has been previously noted.  Cardiology did not recommend any intervention at this time due to recent subdural hematoma, no symptoms of angina, anemia.  -Discontinued aspirin    Recent subdural hematoma, 10/12/2023  -Head CT on 11/12/2023 showed 3 mm landing on frontal lobe after leaving the room subdural hematoma without intracranial mass effect/brain compression  -Repeat head CT on 10/21 showed resolution of subdural  hematoma    Hypokalemia  -Secondary to diuresis.  Potassium replaced.   -No further checks as patient is now comfort cares    Hypomagnesemia, magnesium 1.5  -Replaced  -No further checks as patient is now comfort cares    Chronic microcytic anemia  - Hemoglobin stable at 8-9.  No hematochezia or melena.  - Has history of duodenal ulcers in the past.  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  Will discontinue at discharge             Diet: Regular Diet Adult  Diet    DVT Prophylaxis: Not required as patient is on comfort cares   Burch Catheter: Not present  Lines: PRESENT      PICC 10/23/23 Triple Lumen Right Brachial vein medial Ok for immediate use-Site Assessment: WDL      Cardiac Monitoring: None  Code Status: No CPR- Do NOT Intubate      Clinically Significant Risk Factors              # Hypoalbuminemia: Lowest albumin = 2.8 g/dL at 10/23/2023  6:18 AM, will monitor as appropriate        # Chronic heart failure with preserved ejection fraction: heart failure noted on problem list and last echo with EF >50%           # Financial/Environmental Concerns: none         Disposition Plan      Expected Discharge Date: 10/31/2023      Destination:  inpatient rehabilitation facility              Radha Smith MD  Hospitalist Service  [facility]  Securely message with [employer] (more info)  Text page via [employer] Paging/Directory   ______________________________________________________________________    Interval History   Patient seen in room.  Reports she does not feel well today.  Has not eaten breakfast yet.  Is hoping she will feel better after eating breakfast.  Denies pain or shortness of breath.  Remains on supplemental oxygen.          Physical Exam   Vital Signs:                    Weight: 90 lbs 9.74 oz    Constitutional - awake and alert, resting in bed, in no acute distress  Integumentary - skin is warm and dry, no rashes or ulcers  Neurological - awake, alert and oriented x3.  Moving all 4 extremities, normal speech, no focal deficits      Medical Decision Making       ------------------ MEDICAL DECISION MAKING ------------------------------------------------------------------------------------------------------  Medical complexity over the past 24 hours:  -------------------------- LOW RISK FOR MORBIDITY -------------------------------------------------------------      Data         Imaging results reviewed over the past 24 hrs:   No results found for this or any previous visit (from the past 24 hour(s)).

## 2023-10-30 NOTE — PLAN OF CARE
Goal Outcome Evaluation:       10/30/23 [id number]:    Orientation: AOxself. Arouses to voice/touch. Pleasant.   Activity: A2 w/ lift,pt refused Q2h T/R many times , family at bedside most of the shift.  Diet/BS Checks: Regular-requires assistance with feeding and order foods.   Tele: N/A  IV Access/Drains: R triple lumen PICC-flushed and blood return noted-dressing change this shift.  Pain Management:Denies pain.  PRN tylenol and morphine available  Abnormal VS/Results: Assessments and vitals deferred due to comfort care status. 2L O2 for comfort   Bowel/Bladder: Incont of b/b, PW in place-dark aiyana UOP. 1 large BM   Skin/Wounds: Preventative Mepi on bottom. Scattered bruising.   Consults: Palliative  D/C Disposition: Plan to discharge to [facility] tomorrow around 10 AM with stretcher.     Other Info: on Comfort cares.

## 2023-10-31 NOTE — DISCHARGE SUMMARY
[facility]  Hospitalist Discharge Summary      Date of Admission:  10/21/2023  Date of Discharge:  10/31/2023 10:40 AM  Discharging Provider: Radha Smith MD  Discharge Service: Hospitalist Service    Discharge Diagnoses       Acute cystitis without hematuria, with sepsis, due to E. Coli     Acute metabolic encephalopathy, due to acute cystitis with sepsis     Acute hypoxic respiratory failure requiring intubation and mechanical ventilation-secondary to flash pulmonary edema     Recurrent episodes of flash pulmonary edema due to severe mitral regurgitation and hypertension  Acute on chronic diastolic CHF exacerbation  Severe pulmonary hypertension  Moderate to severe mitral regurgitation  Moderate mitral stenosis     Coronary artery disease - moderate to severe diffuse LAD and RCA disease on angiogram 7/2023   Type II MI with NSTEMI  Noncardiac chest pain    Recent subdural hematoma, 10/12/2023     Hypokalemia  Hypomagnesemia, magnesium 1.5     Chronic microcytic anemia    Rheumatoid arthritis     Peripheral arterial disease, s/p vascularization in past     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  Call PCP as needed        {Additional follow-up instructions/to-do's for PCP    :    Unresulted Labs Ordered in the Past 30 Days of this Admission       No orders found from 9/21/2023 to 10/22/2023.            Discharge Disposition   Discharged to assisted living with hospice  Condition at discharge: Stable    Hospital Course     Nazia Goldsmith is an 83-year-old female with recent subdural hematoma, CAD, severe mitral regurgitation, severe pulmonary  hypertension, who presented on 10/21/2023 with acute confusion.  She was found to have acute cystitis due to E. coli for which she completed antibiotics in hospital.  She was incidentally also found to have manubrial fracture for which conservative management was recommended. She developed acute hypoxic respiratory failure due to flash pulmonary edema on 10/22.  She was intubated and transferred to ICU and diuresed with IV Lasix.  She was extubated and transferred to hospitalist service 10/24/2023.  She has had issues with recurrent flash pulmonary edema has required intubation on previous occasions as well.  This is felt to be due to severe mitral valve regurgitation.  She is not a surgical candidate.  After discussion with palliative care, she was transitioned to comfort cares on 10/26/2023.  She was discharged back to assisted living with hospice on 10/31/2023.  Patient elected to have all her PTA medications discontinued except for Lasix which was continued.  She was additionally prescribed comfort care medications.      Acute cystitis without hematuria due to E. Coli  Sepsis due to above  - Presented with fever of 100.9, heart rate 75, WBC 17.8, abnormal UA with pyuria.  Met criteria for sepsis  - Urine culture grew E. coli, pansensitive.  Completed total 7 days of antibiotics on 10/26      Acute metabolic encephalopathy, due to acute cystitis with sepsis  -Resolved      Acute hypoxic respiratory failure requiring intubation and mechanical ventilation-secondary to flash pulmonary edema  -Was intubated on 10/22 and extubated on 10/24.  Has subsequently been on 2-3 L of supplemental oxygen, continue  -As needed morphine for shortness of breath     Recurrent episodes of flash pulmonary edema due to severe mitral regurgitation and hypertension  Acute diastolic CHF exacerbation  Severe pulmonary hypertension  Moderate to severe mitral regurgitation  Moderate mitral stenosis  - Limited echo 10/23 showed normal LV  size and function, borderline mild inferior inferior lateral hypokinesis, normal RV size and function, moderate mitral stenosis, 3+ mitral regurgitation, severe left atrial enlargement, mildly dilated IVC with increased filling pressures  - Received some IV fluids on admission due to sepsis and subsequently developed acute respiratory failure secondary to flash pulmonary edema on 10/22.  She was intubated.  She was diuresed with IV Lasix.  - Cardiology recommendations of aggressive control of blood pressure with goal less than 120 systolic to help mitral regurgitation and pulmonary edema. Valve anatomy was not suitable for clipping.  She is a poor candidate for mitral valve replacement  - Palliative care consulted.  Patient transitioned to comfort cares on 10/26 and subsequently discharged with hospice on 10/21.  - Continue lasix to 20 mg daily   - All antihypertensives discontinued as per patient's preference        Coronary artery disease - moderate to severe diffuse LAD and RCA disease on angiogram 7/2023   Type II MI with NSTEMI  Noncardiac chest pain  - Chest pain was noncardiac, was reproducible with palpation.  Mildly elevated troponin 113 -->84 -->107 felt to be type II MI due to sepsis, flash pulmonary edema  - Limited echo 10/23 showed wall motion abnormality in left circumflex territory.  This has been previously noted.  Cardiology did not recommend any intervention at this time due to recent subdural hematoma, no symptoms of angina, anemia.  - Discontinued aspirin     Recent subdural hematoma, 10/12/2023  - Head CT on 11/12/2023 showed 3 mm landing on frontal lobe after leaving the room subdural hematoma without intracranial mass effect/brain compression  - Repeat head CT on 10/21 showed resolution of subdural hematoma     Hypokalemia  - Secondary to diuresis.  Potassium replaced.      Hypomagnesemia, magnesium 1.5  -Replaced     Chronic microcytic anemia  - Hemoglobin stable at 8-9.  No hematochezia or  melena.  - Has history of duodenal ulcers in the past.  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  No surgical intervention was recommended     Hyperlipidemia  - Discontinued rosuvastatin    Hypoalbuminemia: Lowest albumin = 2.8 g/dL     Patient discharged to assisted living with hospice.  Continue 3 L of supplemental oxygen for comfort      Consultations This Hospital Stay   PHARMACY TO DOSE VANCO  PHARMACY TO DOSE VANCO  THORACIC SURGERY IP CONSULT  CARDIOLOGY IP CONSULT  INTENSIVIST IP CONSULT  VASCULAR ACCESS ADULT IP CONSULT  CARE MANAGEMENT / SOCIAL WORK IP CONSULT  PHYSICAL THERAPY ADULT IP CONSULT  PALLIATIVE CARE ADULT IP CONSULT  PALLIATIVE CARE ADULT IP CONSULT  CARE MANAGEMENT / SOCIAL WORK IP CONSULT  SPIRITUAL HEALTH SERVICES IP CONSULT  CARE MANAGEMENT / SOCIAL WORK IP CONSULT  VASCULAR ACCESS ADULT IP CONSULT    Code Status   No CPR- Do NOT Intubate    Time Spent on this Encounter   I, Radha Smith MD, personally saw the patient today and spent greater than 30 minutes discharging this patient.       Radha Smith MD  [place] [id number] [facility]  [address] [place], [place] [address], [address]  [facility] [address]  Phone: [phone]  ______________________________________________________________________    Physical Exam   Vital Signs:                    Weight: 90  lbs 9.74 oz    Constitutional - awake and alert, resting in bed, in no acute distress  Integumentary - skin is warm and dry, no rashes or ulcers  Neurological - awake, alert and oriented x3.  Moving all 4 extremities, normal speech, no focal deficits       Primary Care Physician   Torito Watts    Discharge Orders      Follow-Up with Cardiology      Hospice Referral      Reason for your hospital stay    Pulmonary edema, mitral valve regurgitation     Follow-up and recommended labs and tests     No scheduled follow-up needed.  Call PCP as needed     Activity    Your activity upon discharge: activity as tolerated     Oxygen Adult/Peds    Oxygen Documentation  I certify that this patient, Nazia Goldsmith has been under my care (or a nurse practitioner or physican's assistant working with me). This is the face-to-face encounter for oxygen medical necessity.      At the time of this encounter supplemental oxygen is reasonable and necessary and is expected to improve the patient's condition in a home setting.       Patient has continued oxygen desaturation due to Chronic Heart Failure I50  Comfort cares.    If portability is ordered, is the patient mobile within the home? yes     Diet    Follow this diet upon discharge: Regular       Significant Results and Procedures   Results for orders placed or performed during the hospital encounter of 10/21/23   CT Head w/o Contrast    Narrative    EXAM: CT HEAD W/O CONTRAST  LOCATION: [facility]  DATE: 10/21/2023    INDICATION: Confusion  COMPARISON: 10/13/2023.  TECHNIQUE: Routine CT Head without IV contrast. Multiplanar reformats. Dose reduction techniques were used.    FINDINGS:  INTRACRANIAL CONTENTS: No acute intracranial hemorrhage. No hydrocephalus or extra-axial hemorrhage. Mild to moderate global cortical volume loss with expected dilatation of the ventricular system.    Chronic small vessel ischemic changes are stable from recent CT  assessment.    VISUALIZED ORBITS/SINUSES/MASTOIDS: No intraorbital abnormality. No paranasal sinus mucosal disease. No middle ear or mastoid effusion.    BONES/SOFT TISSUES: No acute abnormality.      Impression    IMPRESSION:  1.  No acute intracranial abnormality. Chronic changes as above.   Chest XR,  PA & LAT    Narrative    EXAM: XR CHEST 2 VIEWS  LOCATION: [facility]  DATE: 10/21/2023    INDICATION: fever, confusion  COMPARISON: 10/13/2023      Impression    IMPRESSION: Heart size is normal. There are 2 new right lower lobe pulmonary nodules, measuring up to 2 cm. Left upper lobe nodular opacity abutting the aortic arch redemonstrated. There is increasing prominence of the right hilum. Lung volumes have   diminished with increasing heterogeneous parenchymal opacities. Multifocal pneumonitis favored over asymmetric edema. Further evaluation with chest CT recommended. No associated effusions. Biapical pleural calcifications are redemonstrated.    CT Chest w Contrast    Narrative    EXAM: CT CHEST W CONTRAST  LOCATION: [facility]  DATE: 10/21/2023    INDICATION: bilateral pneumonitis  COMPARISON: CT pulmonary angiogram 07/25/2023  TECHNIQUE: CT chest with IV contrast. Multiplanar reformats were obtained. Dose reduction techniques were used.    CONTRAST: 50  ml Isovue 370    FINDINGS:   LUNGS AND PLEURA: There are multiple heterogeneous attenuation solid lung nodules which are in the peripheral third and/or subpleural lungs bilaterally. A larger representative nodule in the right middle lobe along the anterior costal pleura is 19 x 30   mm (series 4, image 193). Other nodules are present in the lateral segment right middle lobe, right lower lobe superior segment along the mediastinal pleura, in the lateral basal right lower lobe and along the mediastinal pleura of the left upper lobe   and left lower lobe superior segment. Nodules along the mediastinal pleura in  the upper lobe have central cavitation. All of these nodules are stable in size. Bilateral mixed attenuation airspace opacities which predominate within the mid and upper lungs   in July 2023 have cleared. No new airspace opacities. Thin bands of atelectasis are present in the lower lobes along the posterior costal pleura. Trachea and central airways are patent. No pleural effusion.    MEDIASTINUM: Moderate mitral annular calcifications and degenerative thickening of the anterior mitral valve leaflet. Cardiac chambers are normal in size. No pericardial effusion. No enlarged hilar or mediastinal lymph nodes. Esophagus is decompressed.   Imaged thyroid gland is normal. There is mixed attenuation atheroma throughout the aortic arch, proximal great vessels and in the descending thoracic aorta but no thoracic aortic aneurysm.    CORONARY ARTERY CALCIFICATION: Severe.    UPPER ABDOMEN: Mild distention of the imaged intrahepatic bile ducts, similar to prior. There are no new findings in the imaged upper abdomen.    MUSCULOSKELETAL: Unchanged compression deformities of superior endplate of T3, and the T6 T9, and T12 vertebrae. There is a new fracture deformity of the manubrium. Focal heterogeneity and enlargement of the muscles anterior to the proximal right   humerus.      Impression    IMPRESSION:     1.  Solid heterogeneous attenuation bilateral peripheral lung nodules are stable compared to 07/25/2023. In the setting of known diagnosis of rheumatoid arthritis these are consistent with necrobiotic rheumatoid nodules.  2.  Symmetric mid and upper lung mixed attenuation airspace opacities present in July 2023 have resolved. No findings to suggest acute pneumonitis.  3.  Severe atheromatous coronary calcifications and degenerative thickening of the anterior leaflet of the mitral valve.  4.  New mildly displaced fracture of the manubrium. Unchanged thoracic spine compression deformities.   XR Chest Port 1 View    Narrative     EXAM: CHEST SINGLE VIEW PORTABLE  LOCATION: [facility]  DATE/TIME: 10/22/2023 11:25 PM CDT    INDICATION: Acute hypoxic respiratory failure requiring intubation.  COMPARISON: 10/21/2023 at 1800 hours.      Impression    IMPRESSION:   1. Interval placement of an endotracheal tube with distal tip in the trachea approximately 4.5 cm to the lo.  2. Moderately extensive patchy airspace opacities within the central aspects of the upper and mid lungs bilaterally, new since the recent comparison study. This most likely relates to pulmonary edema, but infection could have a similar appearance.   3. No other significant interval change.  4. Nonspecific 2 cm nodular opacity projecting over the lower right lung again noted.   CT Head w/o Contrast    Narrative    EXAM: CT HEAD W/O CONTRAST  LOCATION: [facility]  DATE: 10/23/2023    INDICATION: Acute hypoxic respiratory failure and respiratory arrest  COMPARISON: 10/21/2023.  TECHNIQUE: Routine CT Head without IV contrast. Multiplanar reformats. Dose reduction techniques were used.    FINDINGS:  INTRACRANIAL CONTENTS: No intracranial hemorrhage, extraaxial collection, or mass effect.  No CT evidence of acute infarct. There is scattered diffuse low-attenuation within the periventricular and subcortical white matter consistent with diffuse small   vessel ischemic disease. The ventricular system, basal cisterns and the cortical sulci are consistent with diffuse volume loss.     VISUALIZED ORBITS/SINUSES/MASTOIDS: No intraorbital abnormality. No paranasal sinus mucosal disease. Mastoid air cells and middle ear regions are clear. The patient is status post a partial left mastoidectomy.    BONES/SOFT TISSUES: No acute abnormality.      Impression    IMPRESSION:  1.  No discrete mass lesion, hemorrhage or focal area suggestive of acute infarct.  2.  Stable diffuse age related changes.   XR Abdomen Port 1 View    Narrative     EXAM: XR ABDOMEN PORT 1 VIEW  LOCATION: [facility]  DATE: 10/23/2023    INDICATION: NG placement  COMPARISON: None.      Impression    IMPRESSION: Enteric tube tip in stomach. No visible bowel dilatation. Mitral annular calcification and vascular calcification. Right hip arthroplasty. Degenerative change osseous structures. Nodules right lung base better characterized on CT.   XR Chest Port 1 View    Narrative    CHEST ONE VIEW PORTABLE  10/23/2023 6:07 AM       INDICATION: Flash pulmonary edema.    COMPARISON: 10/22/2023       Impression    IMPRESSION: Marked improvement in bilateral perihilar pulmonary  infiltrates when compared to previous. Endotracheal tube remains in  good position above the lo. New NG tube in the stomach. New right  PICC line tip is in the low SVC, in good position.    DEV MAN MD         SYSTEM ID:  [id number]   XR Chest Port 1 View    Narrative    CHEST ONE VIEW  10/24/2023 10:35 AM     HISTORY: Followup bilateral pulmonary infiltrates.    COMPARISON: 2023      Impression    IMPRESSION: Moderately extensive groundglass infiltrates in a  perihilar distribution left worse than right have increased since  previous.    DARNELL MCGEE MD         SYSTEM ID:  [id number]   Echocardiogram Limited     Value    LVEF  55-60%    Narrative    [id number]  [id number]  [id number]  [id number]^LEXIE^ESTER^GRACE     [facility]  Echocardiography Laboratory  [address] [place] [address]     Name: WALTER MORENO  MRN: [id number]  : 1940  Study Date: 10/23/2023 08:30 AM  Age: 83 yrs  Gender: Female  Patient Location: [facility]  Reason For Study: MI - Subendocardial  Ordering Physician: ESTER OWEN  Referring Physician: Torito Watts  Performed By: Suze Farnsworth     BSA: 1.4 m2  Height: 62 in  Weight: 101 lb  HR: 71  BP: 106/46 mmHg  ______________________________________________________________________________  Procedure  Limited  Portable Echo Adult. Optison (NDC #[id number]) given intravenously.  ______________________________________________________________________________  Interpretation Summary     This was a limited echocardiogram done for pulmonary edema.     LV size is normal. Normal LV systolic function. Borderline mild inferior  inferolateral hypokinesis is noted.  Normal RV size and systolic function.  Heavy mitral annular calcification with 3+ mitral regurgitation. Possibly  underestimated. There is moderate mitral stenosis with mean inflow gradient of  7 to 8 mmHg.  Severe left atrial enlargement.  Mild IVC dilatation consistent with increased filling pressures.     Patient is intubated. No significant changes compared to the prior study from  July 2023.  ______________________________________________________________________________  Left Ventricle  The left ventricle is normal in size. The visual ejection fraction is 55-60%.  There is mild inferior and inferolateral wall hypokinesis.     Right Ventricle  The right ventricle is normal in size and function.     Atria  The left atrium is severely dilated. Right atrial size is normal.     Mitral Valve  There is moderate to severe mitral annular calcification. The mitral valve  leaflets are severely thickened. There is moderately severe (3+) mitral  regurgitation. Increased mitral valve velocity. The peak mitral valve gradient  is 13.1 mmHg. The mean mitral valve gradient is 7mmHg. There is moderate  mitral stenosis.     Tricuspid Valve  Right ventricular systolic pressure could not be approximated due to  inadequate tricuspid regurgitation. There is mild (1+) tricuspid  regurgitation.     Aortic Valve  No hemodynamically significant valvular aortic stenosis.     Pulmonic Valve  The pulmonic valve is not well visualized.     Vessels  Dilation of the inferior vena cava is present with abnormal respiratory  variation in diameter.     Pericardium  There is no pericardial effusion.      ______________________________________________________________________________  MMode/2D Measurements & Calculations  LVOT diam: 2.0 cm  LVOT area: 3.0 cm2     Doppler Measurements & Calculations  MV E max almas: 115.0 cm/sec  MV A max almas: 147.0 cm/sec  MV E/A: 0.78     MV max P.1 mmHg  MV mean P.4 mmHg  MV V2 VTI: 41.4 cm  MVA(VTI): 1.6 cm2  MV dec slope: 491.0 cm/sec2  MV dec time: 0.24 sec  LV V1 max P.8 mmHg  LV V1 max: 109.5 cm/sec  LV V1 VTI: 22.0 cm  MR PISA: 3.1 cm2  MR ERO: 0.22 cm2  MR volume: 40.1 ml  SV(LVOT): 66.6 ml  SI(LVOT): 46.6 ml/m2  E/E' av.6  Lateral E/e': 17.2  Medial E/e': 28.0     ______________________________________________________________________________  Report approved by: Vazquez Joiner 10/23/2023 10:27 AM             Discharge Medications   Discharge Medication List as of 10/31/2023  9:59 AM        START taking these medications    Details   bisacodyl (DULCOLAX) 10 MG suppository Place 1 suppository (10 mg) rectally daily as needed for constipation, Disp-2 suppository, R-0, E-Prescribe      haloperidol (HALDOL) 0.5 MG tablet Take 2 tablets (1 mg) by mouth or place under tongue every 6 hours as needed for agitation or other (nausea), Disp-30 tablet, R-0, E-Prescribe      LORazepam (ATIVAN) 0.5 MG tablet Take 1 tablet (0.5 mg) by mouth or place under tongue every 4 hours as needed for anxiety (restlessness), Disp-30 tablet, R-0, E-Prescribe      morphine (MSIR) 15 MG IR tablet Take 0.5 tablets (7.5 mg) by mouth every 4 hours as needed (comfort care), Disp-12 tablet, R-0, E-Prescribe      senna (SENNA LAX) 8.6 MG tablet Take 2 tablets by mouth 2 times daily as needed for constipation, Disp-30 tablet, R-0, E-Prescribe           CONTINUE these medications which have CHANGED    Details   acetaminophen (TYLENOL) 500 MG tablet Take 2 tablets (1,000 mg) by mouth every 6 hours as needed for mild pain, No Print Out      furosemide (LASIX) 40 MG tablet Take 0.5 tablets (20  mg) by mouth daily, No Print Out      loperamide (IMODIUM) 2 MG capsule Take 2 capsules (4 mg) by mouth 4 times daily as needed for diarrhea, Disp-30 capsule, R-0, E-Prescribe      ondansetron (ZOFRAN ODT) 8 MG ODT tab Take 1 tablet (8 mg) by mouth every 8 hours as needed for nausea, Disp-10 tablet, R-0, E-Prescribe           STOP taking these medications       ferrous gluconate (FERGON) 324 (38 Fe) MG tablet Comments:   Reason for Stopping:         gabapentin (NEURONTIN) 100 MG capsule Comments:   Reason for Stopping:         hydroxychloroquine (PLAQUENIL) 200 MG tablet Comments:   Reason for Stopping:         leflunomide (ARAVA) 20 MG tablet Comments:   Reason for Stopping:         lidocaine HCl 3 % cream Comments:   Reason for Stopping:         losartan (COZAAR) 50 MG tablet Comments:   Reason for Stopping:         metoprolol succinate ER (TOPROL XL) 50 MG 24 hr tablet Comments:   Reason for Stopping:         multivitamin, therapeutic (THERA-VIT) TABS tablet Comments:   Reason for Stopping:         pantoprazole (PROTONIX) 40 MG EC tablet Comments:   Reason for Stopping:         psyllium (METAMUCIL/KONSYL) capsule Comments:   Reason for Stopping:         rosuvastatin (CRESTOR) 10 MG tablet Comments:   Reason for Stopping:         solifenacin (VESICARE) 5 MG tablet Comments:   Reason for Stopping:         spironolactone (ALDACTONE) 25 MG tablet Comments:   Reason for Stopping:         vitamin D3 (CHOLECALCIFEROL) 50 mcg (2000 units) tablet Comments:   Reason for Stopping:         zoledronic Acid (RECLAST) 5 MG/100ML SOLN infusion Comments:   Reason for Stopping:             Allergies   Allergies   Allergen Reactions    Hydrocodone     Methotrexate Unknown    Sulindac Unknown    Triamterene Nausea    Codeine Dizziness    Dyazide [Hydrochlorothiazide W-Triamterene] Nausea and Vomiting    Hydrocodone-Acetaminophen Nausea and Vomiting    Omeprazole Rash    Oxycodone Other (See Comments) and Dizziness     constipation     Ranitidine Rash

## 2023-10-31 NOTE — PROVIDER NOTIFICATION
MD Notification    Notified Person: MD    Notified Person Name: leo    Notification Date/Time: 10/31  0750    Notification Interaction: chaparrita    Purpose of Notification:is the pt suppose to have the PICC removed before discharge?     Orders Received: yes please remove PICC line    Comments:

## 2023-10-31 NOTE — PROVIDER NOTIFICATION
Discharge Note    Patient discharged to Nursing Home via EMS/BLS accompanied by no family/friend .  PICC: Discontinued  Prescriptions sent with patient to discharge facility .   Belongings reviewed and sent with patient.   Home medications returned to patient: NA  Equipment sent with: patient.   patient verbalizes understanding of discharge instructions. AVS given to patient.

## 2023-10-31 NOTE — PLAN OF CARE
Comfort cares maintained.   Orientation: A&O x4.   Activity: A2 with lift.   Diet/BS Checks: Regular diet.   Tele:  N/a  IV Access/Drains: PIV SL.   Pain Management: Denies. PRN robinol, ativan, and morphine available.   Abnormal VS/Results: Deferrred d/t comfort care  Bowel/Bladder: Incontinent of B/B. X1 BM this shift. Purewick in place with adequate UOP.   Skin/Wounds: WDL. Sacral mepi in place.   Consults:   D/C Disposition: Plan to discharge to [facility] with [facility] around 10 AM with stretcher.   Other Info:  Refused T/R overnight.

## 2023-10-31 NOTE — PROGRESS NOTES
Care Management Discharge Note    Discharge Date: 10/31/2023       Discharge Disposition: Transitional Care    Discharge Services: None    Discharge DME: None    Discharge Transportation: family or friend will provide    Private pay costs discussed: transportation costs    Does the patient's insurance plan have a 3 day qualifying hospital stay waiver?  No    PAS Confirmation Code:    Patient/family educated on Medicare website which has current facility and service quality ratings:      Education Provided on the Discharge Plan:    Persons Notified of Discharge Plans: Patients son  Patient/Family in Agreement with the Plan: yes    Handoff Referral Completed: Yes    Additional Information:  Patient will be discharged back to [facility] today with [facility]. Transport is set for [id number] via stretcher. Orders faxed to [phone]. Writer updated Giselle who was in agreement with the plan for discharge. Writer talked with patients son again this morning who was in agreement with the plan    HANDY Wilson

## (undated) DEVICE — INTRODUCER CATH VASC 5FRX10CM  MPIS-501-NT-U-SST

## (undated) DEVICE — CATH DIAG 4FR JL 4.5 538417

## (undated) DEVICE — LINE MONITOR NASAL SMART CAPNOLINE ADULT LONG 12463

## (undated) DEVICE — Device

## (undated) DEVICE — SYR ANGIOGRAPHY MULTIUSE KIT ACIST 014612

## (undated) DEVICE — KIT HAND CONTROL ANGIOTOUCH ACIST 65CM AT-P65

## (undated) DEVICE — INTRO SHEATH 4FRX10CM PINNACLE RSS402

## (undated) DEVICE — TOTE ANGIO CORP PC15AT SAN32CC83O

## (undated) DEVICE — MANIFOLD KIT ANGIO AUTOMATED 014613

## (undated) DEVICE — INTRO SHEATH 7FRX10CM PINNACLE RSS702

## (undated) DEVICE — CATH ANGIO INFINITI 3DRC 4FRX100CM 538476

## (undated) DEVICE — DEFIB PRO-PADZ LVP LQD GEL ADULT 8900-2105-01

## (undated) RX ORDER — HEPARIN SODIUM 200 [USP'U]/100ML
INJECTION, SOLUTION INTRAVENOUS
Status: DISPENSED
Start: 2023-01-01

## (undated) RX ORDER — HEPARIN SODIUM 1000 [USP'U]/ML
INJECTION, SOLUTION INTRAVENOUS; SUBCUTANEOUS
Status: DISPENSED
Start: 2023-01-01

## (undated) RX ORDER — FENTANYL CITRATE 50 UG/ML
INJECTION, SOLUTION INTRAMUSCULAR; INTRAVENOUS
Status: DISPENSED
Start: 2023-01-01

## (undated) RX ORDER — AMINOPHYLLINE 25 MG/ML
INJECTION, SOLUTION INTRAVENOUS
Status: DISPENSED
Start: 2023-01-01

## (undated) RX ORDER — REGADENOSON 0.08 MG/ML
INJECTION, SOLUTION INTRAVENOUS
Status: DISPENSED
Start: 2023-01-01

## (undated) RX ORDER — LIDOCAINE HYDROCHLORIDE 10 MG/ML
INJECTION, SOLUTION EPIDURAL; INFILTRATION; INTRACAUDAL; PERINEURAL
Status: DISPENSED
Start: 2023-01-01